# Patient Record
Sex: FEMALE | Race: WHITE | Employment: OTHER | URBAN - METROPOLITAN AREA
[De-identification: names, ages, dates, MRNs, and addresses within clinical notes are randomized per-mention and may not be internally consistent; named-entity substitution may affect disease eponyms.]

---

## 2017-05-17 ENCOUNTER — APPOINTMENT (OUTPATIENT)
Dept: GENERAL RADIOLOGY | Age: 47
End: 2017-05-17
Attending: PHYSICIAN ASSISTANT
Payer: MEDICARE

## 2017-05-17 ENCOUNTER — HOSPITAL ENCOUNTER (EMERGENCY)
Age: 47
Discharge: HOME | End: 2017-05-17
Attending: EMERGENCY MEDICINE
Payer: MEDICARE

## 2017-05-17 VITALS
WEIGHT: 271.61 LBS | HEIGHT: 62 IN | OXYGEN SATURATION: 99 % | RESPIRATION RATE: 20 BRPM | SYSTOLIC BLOOD PRESSURE: 149 MMHG | BODY MASS INDEX: 49.98 KG/M2 | DIASTOLIC BLOOD PRESSURE: 86 MMHG | TEMPERATURE: 97.6 F

## 2017-05-17 DIAGNOSIS — Z98.890 HISTORY OF BACK SURGERY: ICD-10-CM

## 2017-05-17 DIAGNOSIS — M43.10 ANTEROLISTHESIS: Primary | ICD-10-CM

## 2017-05-17 DIAGNOSIS — M54.50 ACUTE MIDLINE LOW BACK PAIN WITHOUT SCIATICA: ICD-10-CM
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
## 2017-05-17 PROCEDURE — 99283 EMERGENCY DEPT VISIT LOW MDM: CPT

## 2017-05-17 PROCEDURE — 74011250637 HC RX REV CODE- 250/637: Performed by: PHYSICIAN ASSISTANT

## 2017-05-17 PROCEDURE — 96372 THER/PROPH/DIAG INJ SC/IM: CPT

## 2017-05-17 PROCEDURE — 74011250636 HC RX REV CODE- 250/636: Performed by: PHYSICIAN ASSISTANT

## 2017-05-17 PROCEDURE — 72100 X-RAY EXAM L-S SPINE 2/3 VWS: CPT

## 2017-05-17 RX ORDER — BISMUTH SUBSALICYLATE 262 MG
1 TABLET,CHEWABLE ORAL DAILY
COMMUNITY

## 2017-05-17 RX ORDER — IBUPROFEN 600 MG/1
600 TABLET ORAL
Qty: 30 TAB | Refills: 0 | Status: SHIPPED | OUTPATIENT
Start: 2017-05-17 | End: 2018-02-21

## 2017-05-17 RX ORDER — IBUPROFEN 600 MG/1
600 TABLET ORAL
Status: COMPLETED | OUTPATIENT
Start: 2017-05-17 | End: 2017-05-17

## 2017-05-17 RX ORDER — NALOXONE HYDROCHLORIDE 1 MG/ML
2 INJECTION INTRAMUSCULAR; INTRAVENOUS; SUBCUTANEOUS
Qty: 1 SYRINGE | Refills: 0 | Status: SHIPPED | OUTPATIENT
Start: 2017-05-17 | End: 2021-01-11

## 2017-05-17 RX ORDER — OXYCODONE AND ACETAMINOPHEN 5; 325 MG/1; MG/1
1 TABLET ORAL
Qty: 15 TAB | Refills: 0 | Status: SHIPPED | OUTPATIENT
Start: 2017-05-17 | End: 2018-02-21

## 2017-05-17 RX ORDER — OXCARBAZEPINE 600 MG/1
600 TABLET, FILM COATED ORAL 3 TIMES DAILY
Status: ON HOLD | COMMUNITY
End: 2018-02-23

## 2017-05-17 RX ORDER — HYDROMORPHONE HYDROCHLORIDE 1 MG/ML
1 INJECTION, SOLUTION INTRAMUSCULAR; INTRAVENOUS; SUBCUTANEOUS
Status: COMPLETED | OUTPATIENT
Start: 2017-05-17 | End: 2017-05-17

## 2017-05-17 RX ADMIN — IBUPROFEN 600 MG: 600 TABLET, FILM COATED ORAL at 15:19

## 2017-05-17 RX ADMIN — HYDROMORPHONE HYDROCHLORIDE 1 MG: 1 INJECTION, SOLUTION INTRAMUSCULAR; INTRAVENOUS; SUBCUTANEOUS at 15:19

## 2017-05-17 NOTE — ED PROVIDER NOTES
The history is provided by the patient. No  was used. Heike Khan is a 42 y.o. female who presents ambulatory  to [facility] ED with PMH of bipolar, dissciative identity disorder, PTSD, NIDDM, c/o10/10 scale exacerbation of already present lower back pain after slipping on small piece of wood last night causing her to land directly on her tailbone. Patient with hx previous back surgery by Dr Toni Skaggs x 8 years ago. Since injury, patient has been unable to lie flat on her back. Patient denies other specific c/o or concerns today. PCP: Luis Miranda MD  Allergies: darvon  Social Hx: never tobacco, no alcohol    There are no other complaints, changes or physical findings at this time. Past Medical History:   Diagnosis Date    Bipolar affective disorder ([facility] [address]     Dissociative identity disorder     NIDDM (non-insulin dependent diabetes mellitus)     PTSD (post-traumatic stress disorder)        Past Surgical History:   Procedure Laterality Date    DELIVERY       HX BACK SURGERY      HX CHOLECYSTECTOMY      HX TONSILLECTOMY      ID REPAIR ACHILLES TENDON,PRIMARY           History reviewed. No pertinent family history. Social History     Social History    Marital status:      Spouse name: N/A    Number of children: N/A    Years of education: N/A     Occupational History    Not on file. Social History Main Topics    Smoking status: Never Smoker    Smokeless tobacco: Never Used    Alcohol use No    Drug use: No    Sexual activity: Yes     Partners: Male     Birth control/ protection: None     Other Topics Concern    Not on file     Social History Narrative         ALLERGIES: Darvon [propoxyphene]    Review of Systems   Constitutional: Negative for fatigue. HENT: Negative for ear pain and sore throat. Eyes: Negative for pain, redness and visual disturbance. Respiratory: Negative for cough and shortness of breath.     Cardiovascular: Negative for palpitations. Gastrointestinal: Negative for nausea and vomiting. Genitourinary: Negative for frequency and urgency. Musculoskeletal: Positive for back pain. Negative for gait problem, neck pain and neck stiffness. Tailbone/lower back pain s/p fall last night, hx baseline back pain prior to last night's fall. Skin: Negative for rash and wound. Neurological: Negative for dizziness and light-headedness. Vitals:    05/17/17 1328   BP: 149/86   Resp: 20   Temp: 97.6 °F (36.4 °C)   SpO2: 99%   Weight: 123.2 kg (271 lb 9.7 oz)   Height: 5' 2\" (1.575 m)            Physical Exam   Constitutional: She is oriented to person, place, and time. She appears well-developed and well-nourished. Non-toxic appearance. No distress. HENT:   Head: Normocephalic and atraumatic. Right Ear: External ear normal.   Left Ear: External ear normal.   Nose: Nose normal.   Mouth/Throat: Uvula is midline. No trismus in the jaw. Eyes: Conjunctivae and EOM are normal. Pupils are equal, round, and reactive to light. No scleral icterus. Neck: Normal range of motion and full passive range of motion without pain. Cardiovascular: Normal rate and regular rhythm. Pulmonary/Chest: Effort normal. No accessory muscle usage. No tachypnea. No respiratory distress. She has no decreased breath sounds. She has no wheezes. Abdominal: Soft. There is no tenderness. Musculoskeletal: Normal range of motion. Back:    LOWER BACK:  Independently moves from laying to sitting to standing. Well healed surgical scar  No bruising, redness or swelling. No step off. Diffuse muscular discomfort. No CVA tenderness   Neurological: She is alert and oriented to person, place, and time. She is not disoriented. No cranial nerve deficit. GCS eye subscore is 4. GCS verbal subscore is 5. GCS motor subscore is 6.    Negative seated SLR  Symmetric bulk and tone of both LE  Normal sensation along all LE dermatomes  Ambulates independently Skin: Skin is intact. No rash noted. Psychiatric: She has a normal mood and affect. Her speech is normal.   Nursing note and vitals reviewed. MDM  Number of Diagnoses or Management Options  Diagnosis management comments: DDx: lumbar fracture, sacral fracture, coccyx fracture, contusion, fall    ED Course       Procedures     3:29 PM  GENE Cooper at bedside reviewing with patient the imaging results and the treatment plan. Patient verbalizes understanding. 3:40 PM   Patient able to ambulate with slight assistance due to medications given in ER today. GENE Cooper aware. DISCHARGE NOTE:  3:45 PM    The care plan has been outline with the patient and/or family, who verbally conveyed understanding and agreement. Available results have been reviewed. Patient and/or family understand the follow up plan as outlined and discharge instructions. Should their condition deterioration at any time after discharge the patient agrees to return, follow up sooner than outlined or seek medical assistance at the closest Emergency Room as soon as possible. Questions have been answered.  Discharge instructions and educational information regarding the patient's diagnosis as well a list of reasons why the patient would want to seek immediate medical attention, should their condition change, were reviewed directly with the patient/family       IMAGING COMPLETED AND REVIEWED:    Ordering Provider: Ritesh Cruz RN Authorizing Provider: GENE Jones   Ordering Phone: [phone] Authorizing Phone:     Ordering Fax:   Attending Provider: Coleen Rivera MD   Ordering Pager:   PCP: Mynor Vasquez      Other Ordering Provider:        Procedure: XR SPINE LUMB 2 OR 3 V [71167]    Procedure Date: 05/17/2017 2:37 PM   Accession Number: [id number]   Order Number: [id number]      Ordering Diagnosis:     Reason for Exam: low back pain   Performing Department: [facility] RADIOLOGY   Patient Class: EMERGENCY          Study Result      INDICATION: Low back pain     COMPARISON: 8/12/2009 lumbar spine CT     FINDINGS: AP, lateral, and coned-down lateral views of the lumbar spine  demonstrate 5 lumbar type vertebral bodies. There is new grade 1 anterolisthesis  of L4 on L5 by approximately 7 mm. There is no evidence of acute fracture. Anterior fusion hardware at L5-S1 appears intact, and there is evidence of  adequate interosseous fusion. The sacroiliac joints appear intact. No soft  tissue abnormalities are seen.     IMPRESSION  IMPRESSION: New grade 1 anterolisthesis at L4-5. No evidence of acute fracture. Intact fusion hardware L5-S1.                    There are no end exam questions for this visit.         Signing Date/Time: 05/17/2017 3:12 PM   Signed by:  Mechelle Boyd MD   Interpreted/Read by: Mechelle Boyd MD                Medications   HYDROmorphone (PF) (DILAUDID) injection 1 mg (1 mg IntraMUSCular Given 5/17/17 1519)   ibuprofen (MOTRIN) tablet 600 mg (600 mg Oral Given 5/17/17 1519)       CLINICAL IMPRESSION:  1. Anterolisthesis    2. Acute midline low back pain without sciatica    3. History of back surgery    4. Fall, initial encounter        Plan:  1. Take today's ER prescribed medications as directed  2. Rest  3. Narcotic precautions reviewed with patient prior to discharge  4. Follow up with margot Padron, as needed  5. Return precautions reviewed with patient prior to discharge    Current Discharge Medication List      START taking these medications    Details   naloxone (NARCAN) 1 mg/mL injection 2 mL by IntraMUSCular route once as needed for up to 1 dose. Qty: 1 Syringe, Refills: 0      oxyCODONE-acetaminophen (PERCOCET) 5-325 mg per tablet Take 1 Tab by mouth every four (4) hours as needed for Pain. Max Daily Amount: 6 Tabs. Qty: 15 Tab, Refills: 0      ibuprofen (MOTRIN) 600 mg tablet Take 1 Tab by mouth every eight (8) hours as needed for Pain.   Qty: 30 Tab, Refills: 0           Follow-up Information     Follow up With Details Comments Contact Info    Nicolasa Bobo MD Schedule an appointment as soon as possible for a visit ORTHO/SPINE: as needed if symptoms persist [facility] [address]  [place]  [phone]          Return to the closest emergency room or follow up sooner for any deterioration    This note is prepared by Leonetta Meckel, acting as Scribe for CHITO Agarwal PA-C : The scribe's documentation has been prepared under my direction and personally reviewed by me in its entirety. I confirm that the note above accurately reflects all work, treatment, procedures, and medical decision making performed by me.    3:46 PM  I was personally available for consultation in the emergency department. I have reviewed the chart and agree with the documentation recorded by the [facility], including the assessment, treatment plan, and disposition.   Kendra Posey MD

## 2017-05-17 NOTE — ED NOTES
Patient reports she slipped yesterday, falling on her buttocks, now c/o low back pain radiating down posterior thighs

## 2017-05-17 NOTE — DISCHARGE INSTRUCTIONS
Learning About Relief for Back Pain  What is back tension and strain? Back strain happens when you overstretch, or pull, a muscle in your back. You may hurt your back in an accident or when you exercise or lift something. Most back pain will get better with rest and time. You can take care of yourself at home to help your back heal.  What can you do first to relieve back pain? When you first feel back pain, try these steps:  · Walk. Take a short walk (10 to 20 minutes) on a level surface (no slopes, hills, or stairs) every 2 to 3 hours. Walk only distances you can manage without pain, especially leg pain. · Relax. Find a comfortable position for rest. Some people are comfortable on the floor or a medium-firm bed with a small pillow under their head and another under their knees. Some people prefer to lie on their side with a pillow between their knees. Don't stay in one position for too long. · Try heat or ice. Try using a heating pad on a low or medium setting, or take a warm shower, for 15 to 20 minutes every 2 to 3 hours. Or you can buy single-use heat wraps that last up to 8 hours. You can also try an ice pack for 10 to 15 minutes every 2 to 3 hours. You can use an ice pack or a bag of frozen vegetables wrapped in a thin towel. There is not strong evidence that either heat or ice will help, but you can try them to see if they help. You may also want to try switching between heat and cold. · Take pain medicine exactly as directed. ¨ If the doctor gave you a prescription medicine for pain, take it as prescribed. ¨ If you are not taking a prescription pain medicine, ask your doctor if you can take an over-the-counter medicine. What else can you do? · Stretch and exercise. Exercises that increase flexibility may relieve your pain and make it easier for your muscles to keep your spine in a good, neutral position. And don't forget to keep walking. · Do self-massage.  You can use self-massage to unwind after work or school or to energize yourself in the morning. You can easily massage your feet, hands, or neck. Self-massage works best if you are in comfortable clothes and are sitting or lying in a comfortable position. Use oil or lotion to massage bare skin. · Reduce stress. Back pain can lead to a vicious [place]: Distress about the pain tenses the muscles in your back, which in turn causes more pain. Learn how to relax your mind and your muscles to lower your stress. Where can you learn more? Go to http://alicia-elfego.info/. Enter A560 in the search box to learn more about \"Learning About Relief for Back Pain. \"  Current as of: May 23, 2016  Content Version: 11.2  © 7816-4012 [employer]. Care instructions adapted under license by [employer] (which disclaims liability or warranty for this information). If you have questions about a medical condition or this instruction, always ask your healthcare professional. [place] [address] any warranty or liability for your use of this information.

## 2017-05-17 NOTE — ED NOTES
Pt ambulated without difficulty, states she feels a little loopy but her back pain is gone;  Discharge instructions reviewed with pt and copy given by paul garland pAC

## 2017-06-16 ENCOUNTER — HOSPITAL ENCOUNTER (OUTPATIENT)
Dept: MRI IMAGING | Age: 47
Discharge: HOME | End: 2017-06-16
Attending: ORTHOPAEDIC SURGERY
Payer: MEDICARE

## 2017-06-16 DIAGNOSIS — Z98.1 S/P SPINAL FUSION: ICD-10-CM

## 2017-06-16 DIAGNOSIS — M43.16 SPONDYLOLISTHESIS OF LUMBAR REGION: ICD-10-CM

## 2017-06-16 DIAGNOSIS — G89.29 CHRONIC LOW BACK PAIN WITH BILATERAL SCIATICA: ICD-10-CM

## 2017-06-16 DIAGNOSIS — M54.41 CHRONIC LOW BACK PAIN WITH BILATERAL SCIATICA: ICD-10-CM

## 2017-06-16 DIAGNOSIS — G89.29 CHRONIC LOW BACK PAIN: ICD-10-CM

## 2017-06-16 DIAGNOSIS — M54.42 CHRONIC LOW BACK PAIN WITH BILATERAL SCIATICA: ICD-10-CM

## 2017-06-16 DIAGNOSIS — M54.50 CHRONIC LOW BACK PAIN: ICD-10-CM

## 2017-06-16 PROCEDURE — 72158 MRI LUMBAR SPINE W/O & W/DYE: CPT

## 2017-06-16 PROCEDURE — A9576 INJ PROHANCE MULTIPACK: HCPCS | Performed by: ORTHOPAEDIC SURGERY

## 2017-06-16 PROCEDURE — 74011250636 HC RX REV CODE- 250/636: Performed by: ORTHOPAEDIC SURGERY

## 2017-06-16 RX ADMIN — GADOTERIDOL 20 ML: 279.3 INJECTION, SOLUTION INTRAVENOUS at 12:54

## 2017-07-12 ENCOUNTER — HOSPITAL ENCOUNTER (OUTPATIENT)
Dept: MAMMOGRAPHY | Age: 47
Discharge: HOME | End: 2017-07-12
Attending: OBSTETRICS & GYNECOLOGY
Payer: MEDICARE

## 2017-07-12 DIAGNOSIS — N64.9 DISORDER OF BREAST: ICD-10-CM

## 2017-07-12 DIAGNOSIS — Z80.3 FAMILY HISTORY OF MALIGNANT NEOPLASM OF BREAST: ICD-10-CM

## 2017-07-12 PROCEDURE — 77066 DX MAMMO INCL CAD BI: CPT

## 2018-02-21 ENCOUNTER — HOSPITAL ENCOUNTER (INPATIENT)
Age: 48
LOS: 2 days | Discharge: HOME | DRG: 641 | End: 2018-02-23
Attending: EMERGENCY MEDICINE | Admitting: INTERNAL MEDICINE
Payer: MEDICARE

## 2018-02-21 ENCOUNTER — HOSPITAL ENCOUNTER (OUTPATIENT)
Dept: PREADMISSION TESTING | Age: 48
Discharge: HOME | DRG: 641 | End: 2018-02-21
Attending: ORTHOPAEDIC SURGERY
Payer: MEDICARE

## 2018-02-21 ENCOUNTER — HOSPITAL ENCOUNTER (OUTPATIENT)
Dept: GENERAL RADIOLOGY | Age: 48
Discharge: HOME | DRG: 641 | End: 2018-02-21
Attending: ORTHOPAEDIC SURGERY
Payer: MEDICARE

## 2018-02-21 VITALS
HEIGHT: 62 IN | HEART RATE: 76 BPM | RESPIRATION RATE: 16 BRPM | WEIGHT: 267.2 LBS | OXYGEN SATURATION: 96 % | BODY MASS INDEX: 49.17 KG/M2 | DIASTOLIC BLOOD PRESSURE: 78 MMHG | SYSTOLIC BLOOD PRESSURE: 149 MMHG | TEMPERATURE: 98.3 F

## 2018-02-21 DIAGNOSIS — E87.1 HYPONATREMIA: Primary | ICD-10-CM

## 2018-02-21 LAB
25(OH)D3 SERPL-MCNC: 109.6 NG/ML (ref 30–100)
ABO + RH BLD: NORMAL
ALBUMIN SERPL-MCNC: 3.9 G/DL (ref 3.5–5)
ALBUMIN SERPL-MCNC: 4.2 G/DL (ref 3.5–5)
ALBUMIN/GLOB SERPL: 1 {RATIO} (ref 1.1–2.2)
ALBUMIN/GLOB SERPL: 1.1 {RATIO} (ref 1.1–2.2)
ALP SERPL-CCNC: 92 U/L (ref 45–117)
ALP SERPL-CCNC: 98 U/L (ref 45–117)
ALT SERPL-CCNC: 24 U/L (ref 12–78)
ALT SERPL-CCNC: 25 U/L (ref 12–78)
ANION GAP SERPL CALC-SCNC: 10 MMOL/L (ref 5–15)
ANION GAP SERPL CALC-SCNC: 7 MMOL/L (ref 5–15)
APPEARANCE UR: CLEAR
AST SERPL-CCNC: 13 U/L (ref 15–37)
AST SERPL-CCNC: 15 U/L (ref 15–37)
ATRIAL RATE: 66 BPM
BACTERIA URNS QL MICRO: NEGATIVE /HPF
BASOPHILS # BLD: 0.1 K/UL (ref 0–0.1)
BASOPHILS NFR BLD: 1 % (ref 0–1)
BILIRUB SERPL-MCNC: 0.3 MG/DL (ref 0.2–1)
BILIRUB SERPL-MCNC: 0.4 MG/DL (ref 0.2–1)
BILIRUB UR QL: NEGATIVE
BLOOD GROUP ANTIBODIES SERPL: NORMAL
BUN SERPL-MCNC: 5 MG/DL (ref 6–20)
BUN SERPL-MCNC: 6 MG/DL (ref 6–20)
BUN/CREAT SERPL: 11 (ref 12–20)
BUN/CREAT SERPL: 8 (ref 12–20)
CALCIUM SERPL-MCNC: 8.3 MG/DL (ref 8.5–10.1)
CALCIUM SERPL-MCNC: 8.8 MG/DL (ref 8.5–10.1)
CALCULATED P AXIS, ECG09: 28 DEGREES
CALCULATED R AXIS, ECG10: 10 DEGREES
CALCULATED T AXIS, ECG11: 16 DEGREES
CHLORIDE SERPL-SCNC: 84 MMOL/L (ref 97–108)
CHLORIDE SERPL-SCNC: 85 MMOL/L (ref 97–108)
CO2 SERPL-SCNC: 26 MMOL/L (ref 21–32)
CO2 SERPL-SCNC: 26 MMOL/L (ref 21–32)
COLOR UR: NORMAL
CREAT SERPL-MCNC: 0.54 MG/DL (ref 0.55–1.02)
CREAT SERPL-MCNC: 0.62 MG/DL (ref 0.55–1.02)
DIAGNOSIS, 93000: NORMAL
DIFFERENTIAL METHOD BLD: ABNORMAL
EOSINOPHIL # BLD: 0.1 K/UL (ref 0–0.4)
EOSINOPHIL NFR BLD: 1 % (ref 0–7)
EPITH CASTS URNS QL MICRO: NORMAL /LPF
ERYTHROCYTE [DISTWIDTH] IN BLOOD BY AUTOMATED COUNT: 11.7 % (ref 11.5–14.5)
ERYTHROCYTE [DISTWIDTH] IN BLOOD BY AUTOMATED COUNT: 11.9 % (ref 11.5–14.5)
EST. AVERAGE GLUCOSE BLD GHB EST-MCNC: 140 MG/DL
GLOBULIN SER CALC-MCNC: 3.9 G/DL (ref 2–4)
GLOBULIN SER CALC-MCNC: 4 G/DL (ref 2–4)
GLUCOSE BLD STRIP.AUTO-MCNC: 178 MG/DL (ref 65–100)
GLUCOSE SERPL-MCNC: 104 MG/DL (ref 65–100)
GLUCOSE SERPL-MCNC: 109 MG/DL (ref 65–100)
GLUCOSE UR STRIP.AUTO-MCNC: NEGATIVE MG/DL
HBA1C MFR BLD: 6.5 % (ref 4.2–6.3)
HCT VFR BLD AUTO: 35.3 % (ref 35–47)
HCT VFR BLD AUTO: 38.1 % (ref 35–47)
HGB BLD-MCNC: 12.7 G/DL (ref 11.5–16)
HGB BLD-MCNC: 13.1 G/DL (ref 11.5–16)
HGB UR QL STRIP: NEGATIVE
HYALINE CASTS URNS QL MICRO: NORMAL /LPF (ref 0–5)
IMM GRANULOCYTES # BLD: 0 K/UL (ref 0–0.04)
IMM GRANULOCYTES NFR BLD AUTO: 0 % (ref 0–0.5)
INR PPP: 1 (ref 0.9–1.1)
KETONES UR QL STRIP.AUTO: NEGATIVE MG/DL
LEUKOCYTE ESTERASE UR QL STRIP.AUTO: NEGATIVE
LYMPHOCYTES # BLD: 2.8 K/UL (ref 0.8–3.5)
LYMPHOCYTES NFR BLD: 30 % (ref 12–49)
MAGNESIUM SERPL-MCNC: 2 MG/DL (ref 1.6–2.4)
MCH RBC QN AUTO: 27.4 PG (ref 26–34)
MCH RBC QN AUTO: 28.1 PG (ref 26–34)
MCHC RBC AUTO-ENTMCNC: 34.4 G/DL (ref 30–36.5)
MCHC RBC AUTO-ENTMCNC: 36 G/DL (ref 30–36.5)
MCV RBC AUTO: 78.1 FL (ref 80–99)
MCV RBC AUTO: 79.7 FL (ref 80–99)
MONOCYTES # BLD: 0.6 K/UL (ref 0–1)
MONOCYTES NFR BLD: 6 % (ref 5–13)
NEUTS SEG # BLD: 5.7 K/UL (ref 1.8–8)
NEUTS SEG NFR BLD: 61 % (ref 32–75)
NITRITE UR QL STRIP.AUTO: NEGATIVE
NRBC # BLD: 0 K/UL (ref 0–0.01)
NRBC # BLD: 0.02 K/UL (ref 0–0.01)
NRBC BLD-RTO: 0 PER 100 WBC
NRBC BLD-RTO: 0.2 PER 100 WBC
P-R INTERVAL, ECG05: 176 MS
PH UR STRIP: 7 [PH] (ref 5–8)
PLATELET # BLD AUTO: 211 K/UL (ref 150–400)
PLATELET # BLD AUTO: 317 K/UL (ref 150–400)
PMV BLD AUTO: 10.9 FL (ref 8.9–12.9)
PMV BLD AUTO: 9.5 FL (ref 8.9–12.9)
POTASSIUM SERPL-SCNC: 4 MMOL/L (ref 3.5–5.1)
POTASSIUM SERPL-SCNC: 4.1 MMOL/L (ref 3.5–5.1)
PREALB SERPL-MCNC: 34.3 MG/DL (ref 20–40)
PROT SERPL-MCNC: 7.8 G/DL (ref 6.4–8.2)
PROT SERPL-MCNC: 8.2 G/DL (ref 6.4–8.2)
PROT UR STRIP-MCNC: NEGATIVE MG/DL
PROTHROMBIN TIME: 10.4 SEC (ref 9–11.1)
Q-T INTERVAL, ECG07: 424 MS
QRS DURATION, ECG06: 86 MS
QTC CALCULATION (BEZET), ECG08: 444 MS
RBC # BLD AUTO: 4.52 M/UL (ref 3.8–5.2)
RBC # BLD AUTO: 4.78 M/UL (ref 3.8–5.2)
RBC #/AREA URNS HPF: NORMAL /HPF (ref 0–5)
SERVICE CMNT-IMP: ABNORMAL
SODIUM SERPL-SCNC: 118 MMOL/L (ref 136–145)
SODIUM SERPL-SCNC: 120 MMOL/L (ref 136–145)
SODIUM UR-SCNC: 46 MMOL/L
SP GR UR REFRACTOMETRY: 1.01 (ref 1–1.03)
SPECIMEN EXP DATE BLD: NORMAL
UA: UC IF INDICATED,UAUC: NORMAL
UROBILINOGEN UR QL STRIP.AUTO: 0.2 EU/DL (ref 0.2–1)
VENTRICULAR RATE, ECG03: 66 BPM
WBC # BLD AUTO: 7.2 K/UL (ref 3.6–11)
WBC # BLD AUTO: 9.4 K/UL (ref 3.6–11)
WBC URNS QL MICRO: NORMAL /HPF (ref 0–4)

## 2018-02-21 PROCEDURE — 80053 COMPREHEN METABOLIC PANEL: CPT | Performed by: ORTHOPAEDIC SURGERY

## 2018-02-21 PROCEDURE — 83036 HEMOGLOBIN GLYCOSYLATED A1C: CPT | Performed by: ORTHOPAEDIC SURGERY

## 2018-02-21 PROCEDURE — 84134 ASSAY OF PREALBUMIN: CPT | Performed by: ORTHOPAEDIC SURGERY

## 2018-02-21 PROCEDURE — 86900 BLOOD TYPING SEROLOGIC ABO: CPT | Performed by: ORTHOPAEDIC SURGERY

## 2018-02-21 PROCEDURE — 93005 ELECTROCARDIOGRAM TRACING: CPT

## 2018-02-21 PROCEDURE — 94761 N-INVAS EAR/PLS OXIMETRY MLT: CPT

## 2018-02-21 PROCEDURE — 71046 X-RAY EXAM CHEST 2 VIEWS: CPT

## 2018-02-21 PROCEDURE — 74011250636 HC RX REV CODE- 250/636: Performed by: EMERGENCY MEDICINE

## 2018-02-21 PROCEDURE — 85027 COMPLETE CBC AUTOMATED: CPT | Performed by: ORTHOPAEDIC SURGERY

## 2018-02-21 PROCEDURE — 81001 URINALYSIS AUTO W/SCOPE: CPT | Performed by: ORTHOPAEDIC SURGERY

## 2018-02-21 PROCEDURE — 80053 COMPREHEN METABOLIC PANEL: CPT | Performed by: PHYSICIAN ASSISTANT

## 2018-02-21 PROCEDURE — 36415 COLL VENOUS BLD VENIPUNCTURE: CPT | Performed by: PHYSICIAN ASSISTANT

## 2018-02-21 PROCEDURE — 82306 VITAMIN D 25 HYDROXY: CPT | Performed by: ORTHOPAEDIC SURGERY

## 2018-02-21 PROCEDURE — 85025 COMPLETE CBC W/AUTO DIFF WBC: CPT | Performed by: PHYSICIAN ASSISTANT

## 2018-02-21 PROCEDURE — 82962 GLUCOSE BLOOD TEST: CPT

## 2018-02-21 PROCEDURE — 74011250636 HC RX REV CODE- 250/636: Performed by: INTERNAL MEDICINE

## 2018-02-21 PROCEDURE — 65270000029 HC RM PRIVATE

## 2018-02-21 PROCEDURE — 83735 ASSAY OF MAGNESIUM: CPT | Performed by: PHYSICIAN ASSISTANT

## 2018-02-21 PROCEDURE — 74011250637 HC RX REV CODE- 250/637: Performed by: INTERNAL MEDICINE

## 2018-02-21 PROCEDURE — 84300 ASSAY OF URINE SODIUM: CPT | Performed by: EMERGENCY MEDICINE

## 2018-02-21 PROCEDURE — 99284 EMERGENCY DEPT VISIT MOD MDM: CPT

## 2018-02-21 PROCEDURE — 85610 PROTHROMBIN TIME: CPT | Performed by: ORTHOPAEDIC SURGERY

## 2018-02-21 RX ORDER — ATORVASTATIN CALCIUM 40 MG/1
40 TABLET, FILM COATED ORAL DAILY
Status: DISCONTINUED | OUTPATIENT
Start: 2018-02-22 | End: 2018-02-23 | Stop reason: HOSPADM

## 2018-02-21 RX ORDER — SODIUM CHLORIDE 0.9 % (FLUSH) 0.9 %
5-10 SYRINGE (ML) INJECTION AS NEEDED
Status: DISCONTINUED | OUTPATIENT
Start: 2018-02-21 | End: 2018-02-23 | Stop reason: HOSPADM

## 2018-02-21 RX ORDER — FACIAL-BODY WIPES
10 EACH TOPICAL DAILY PRN
Status: DISCONTINUED | OUTPATIENT
Start: 2018-02-21 | End: 2018-02-23 | Stop reason: HOSPADM

## 2018-02-21 RX ORDER — CLONAZEPAM 1 MG/1
1 TABLET ORAL 2 TIMES DAILY
COMMUNITY
End: 2021-01-11 | Stop reason: SDUPTHER

## 2018-02-21 RX ORDER — CLONAZEPAM 0.5 MG/1
0.5 TABLET ORAL
Status: DISCONTINUED | OUTPATIENT
Start: 2018-02-22 | End: 2018-02-23 | Stop reason: HOSPADM

## 2018-02-21 RX ORDER — ENOXAPARIN SODIUM 100 MG/ML
40 INJECTION SUBCUTANEOUS EVERY 12 HOURS
Status: DISCONTINUED | OUTPATIENT
Start: 2018-02-21 | End: 2018-02-23 | Stop reason: HOSPADM

## 2018-02-21 RX ORDER — SODIUM CHLORIDE 9 MG/ML
75 INJECTION, SOLUTION INTRAVENOUS CONTINUOUS
Status: DISCONTINUED | OUTPATIENT
Start: 2018-02-21 | End: 2018-02-23 | Stop reason: HOSPADM

## 2018-02-21 RX ORDER — INSULIN LISPRO 100 [IU]/ML
8 INJECTION, SOLUTION INTRAVENOUS; SUBCUTANEOUS
COMMUNITY
End: 2021-01-11

## 2018-02-21 RX ORDER — ONDANSETRON 2 MG/ML
4 INJECTION INTRAMUSCULAR; INTRAVENOUS
Status: DISCONTINUED | OUTPATIENT
Start: 2018-02-21 | End: 2018-02-23 | Stop reason: HOSPADM

## 2018-02-21 RX ORDER — MELOXICAM 15 MG/1
15 TABLET ORAL DAILY
COMMUNITY
End: 2021-01-11

## 2018-02-21 RX ORDER — SODIUM CHLORIDE 0.9 % (FLUSH) 0.9 %
5-10 SYRINGE (ML) INJECTION EVERY 8 HOURS
Status: DISCONTINUED | OUTPATIENT
Start: 2018-02-21 | End: 2018-02-23 | Stop reason: HOSPADM

## 2018-02-21 RX ORDER — BACLOFEN 10 MG/1
10 TABLET ORAL 2 TIMES DAILY
COMMUNITY
End: 2021-01-21

## 2018-02-21 RX ORDER — INSULIN LISPRO 100 [IU]/ML
INJECTION, SOLUTION INTRAVENOUS; SUBCUTANEOUS
Status: DISCONTINUED | OUTPATIENT
Start: 2018-02-22 | End: 2018-02-23 | Stop reason: HOSPADM

## 2018-02-21 RX ORDER — NALOXONE HYDROCHLORIDE 0.4 MG/ML
0.4 INJECTION, SOLUTION INTRAMUSCULAR; INTRAVENOUS; SUBCUTANEOUS AS NEEDED
Status: DISCONTINUED | OUTPATIENT
Start: 2018-02-21 | End: 2018-02-23 | Stop reason: HOSPADM

## 2018-02-21 RX ORDER — ACETAMINOPHEN 325 MG/1
650 TABLET ORAL
Status: DISCONTINUED | OUTPATIENT
Start: 2018-02-21 | End: 2018-02-23 | Stop reason: HOSPADM

## 2018-02-21 RX ORDER — OXYCODONE AND ACETAMINOPHEN 5; 325 MG/1; MG/1
1 TABLET ORAL
Status: DISCONTINUED | OUTPATIENT
Start: 2018-02-21 | End: 2018-02-23 | Stop reason: HOSPADM

## 2018-02-21 RX ORDER — GABAPENTIN 300 MG/1
600 CAPSULE ORAL 3 TIMES DAILY
COMMUNITY
End: 2022-06-27

## 2018-02-21 RX ORDER — ERGOCALCIFEROL 1.25 MG/1
50000 CAPSULE ORAL 2 TIMES WEEKLY
COMMUNITY
End: 2021-01-11

## 2018-02-21 RX ORDER — ACETAMINOPHEN 500 MG
500 TABLET ORAL
COMMUNITY
End: 2020-10-19

## 2018-02-21 RX ORDER — MAGNESIUM SULFATE 100 %
4 CRYSTALS MISCELLANEOUS AS NEEDED
Status: DISCONTINUED | OUTPATIENT
Start: 2018-02-21 | End: 2018-02-23 | Stop reason: HOSPADM

## 2018-02-21 RX ORDER — METFORMIN HYDROCHLORIDE 1000 MG/1
1000 TABLET ORAL 2 TIMES DAILY WITH MEALS
COMMUNITY

## 2018-02-21 RX ORDER — LISINOPRIL 20 MG/1
20 TABLET ORAL DAILY
Status: DISCONTINUED | OUTPATIENT
Start: 2018-02-22 | End: 2018-02-23 | Stop reason: HOSPADM

## 2018-02-21 RX ORDER — GABAPENTIN 300 MG/1
300 CAPSULE ORAL 2 TIMES DAILY
Status: DISCONTINUED | OUTPATIENT
Start: 2018-02-22 | End: 2018-02-23 | Stop reason: HOSPADM

## 2018-02-21 RX ORDER — DEXTROSE 50 % IN WATER (D50W) INTRAVENOUS SYRINGE
12.5-25 AS NEEDED
Status: DISCONTINUED | OUTPATIENT
Start: 2018-02-21 | End: 2018-02-23 | Stop reason: HOSPADM

## 2018-02-21 RX ORDER — BACLOFEN 10 MG/1
10 TABLET ORAL EVERY EVENING
Status: DISCONTINUED | OUTPATIENT
Start: 2018-02-22 | End: 2018-02-23 | Stop reason: HOSPADM

## 2018-02-21 RX ORDER — METFORMIN HYDROCHLORIDE 500 MG/1
1000 TABLET ORAL 2 TIMES DAILY WITH MEALS
Status: DISCONTINUED | OUTPATIENT
Start: 2018-02-22 | End: 2018-02-23 | Stop reason: HOSPADM

## 2018-02-21 RX ORDER — MELOXICAM 7.5 MG/1
15 TABLET ORAL DAILY
Status: DISCONTINUED | OUTPATIENT
Start: 2018-02-22 | End: 2018-02-23 | Stop reason: HOSPADM

## 2018-02-21 RX ORDER — ATORVASTATIN CALCIUM 40 MG/1
40 TABLET, FILM COATED ORAL DAILY
COMMUNITY

## 2018-02-21 RX ORDER — CLONAZEPAM 1 MG/1
0.5 TABLET ORAL
COMMUNITY
End: 2021-01-11

## 2018-02-21 RX ADMIN — SODIUM CHLORIDE 75 ML/HR: 900 INJECTION, SOLUTION INTRAVENOUS at 21:26

## 2018-02-21 RX ADMIN — ENOXAPARIN SODIUM 40 MG: 40 INJECTION SUBCUTANEOUS at 21:26

## 2018-02-21 RX ADMIN — SODIUM CHLORIDE 1000 ML: 900 INJECTION, SOLUTION INTRAVENOUS at 20:28

## 2018-02-21 RX ADMIN — OXYCODONE HYDROCHLORIDE AND ACETAMINOPHEN 1 TABLET: 5; 325 TABLET ORAL at 22:00

## 2018-02-21 RX ADMIN — Medication 10 ML: at 22:00

## 2018-02-21 NOTE — IP AVS SNAPSHOT
[address] [facility] 
[phone] Patient: Anastasiia Sharma MRN: [id number] :1970 A check angelic indicates which time of day the medication should be taken. My Medications CHANGE how you take these medications Instructions Each Dose to Equal  
 Morning Noon Evening Bedtime * clonazePAM 1 mg tablet Commonly known as:  Brena Bevels What changed:  Another medication with the same name was removed. Continue taking this medication, and follow the directions you see here. Your last dose was: Your next dose is: Take 1 mg by mouth two (2) times a day. Administration Instructions: Patient takes 1 mg in the morning and evening and 0.5 mg in the afternoon. 1 mg * clonazePAM 1 mg tablet Commonly known as:  Brena Bevels What changed:  Another medication with the same name was removed. Continue taking this medication, and follow the directions you see here. Your last dose was: Your next dose is: Take 0.5 mg by mouth daily (with lunch). Administration Instructions: Patient takes 1 mg in the morning and evening and 0.5 mg in the afternoon. 0.5 mg  
    
   
   
   
  
 LATUDA 80 mg Tab tablet Generic drug:  lurasidone What changed:  Another medication with the same name was removed. Continue taking this medication, and follow the directions you see here. Your last dose was: Your next dose is: Take 80 mg by mouth daily. 80 mg OXcarbaxepine 600 mg tablet Commonly known as:  TRILEPTAL What changed:   
- how much to take - when to take this Your last dose was: Your next dose is: Take 0.5 Tabs by mouth two (2) times a day. 300 mg  
    
   
   
   
  
 VITAMIN D2 50,000 unit capsule Generic drug:  ergocalciferol What changed:  Another medication with the same name was removed. Continue taking this medication, and follow the directions you see here. Your last dose was: Your next dose is: Take 50,000 Units by mouth two (2) times a week. on Monday and Thursday [address]113 Units * Notice: This list has 2 medication(s) that are the same as other medications prescribed for you. Read the directions carefully, and ask your doctor or other care provider to review them with you. CONTINUE taking these medications Instructions Each Dose to Equal  
 Morning Noon Evening Bedtime  
 atorvastatin 40 mg tablet Commonly known as:  LIPITOR Your last dose was: Your next dose is: Take 40 mg by mouth daily. 40 mg  
    
   
   
   
  
 baclofen 10 mg tablet Commonly known as:  LIORESAL Your last dose was: Your next dose is: Take 10 mg by mouth every evening. 10 mg  
    
   
   
   
  
 gabapentin 300 mg capsule Commonly known as:  NEURONTIN Your last dose was: Your next dose is: Take 300 mg by mouth two (2) times a day. 300 mg  
    
   
   
   
  
 insulin lispro 100 unit/mL kwikpen Commonly known as:  HUMALOG Your last dose was: Your next dose is:    
   
   
 8 Units by SubCUTAneous route Before breakfast and dinner. 8 Units  
    
   
   
   
  
 lisinopril 20 mg tablet Commonly known as:  [place] Boor Your last dose was: Your next dose is: Take 20 mg by mouth daily. 20 mg  
    
   
   
   
  
 meloxicam 15 mg tablet Commonly known as:  MOBIC Your last dose was: Your next dose is: Take 15 mg by mouth daily. 15 mg  
    
   
   
   
  
 metFORMIN 1,000 mg tablet Commonly known as:  GLUCOPHAGE Your last dose was: Your next dose is: Take 1,000 mg by mouth two (2) times daily (with meals). 1000 mg  
    
   
   
   
  
 multivitamin tablet Commonly known as:  ONE A DAY Your last dose was: Your next dose is: Take 1 Tab by mouth daily. 1 Tab  
    
   
   
   
  
 naloxone 1 mg/mL injection Commonly known as:  [employer] Your last dose was: Your next dose is:    
   
   
 2 mL by IntraMUSCular route once as needed for up to 1 dose. 2 mg TYLENOL EXTRA STRENGTH 500 mg tablet Generic drug:  acetaminophen Your last dose was: Your next dose is: Take 500 mg by mouth every six (6) hours as needed for Pain. 500 mg Where to Get Your Medications Information on where to get these meds will be given to you by the nurse or doctor. ! Ask your nurse or doctor about these medications OXcarbaxepine 600 mg tablet

## 2018-02-21 NOTE — PERIOP NOTES
[facility]  Preoperative Instructions        Surgery Date 3/5/2018          Time of Arrival 12 Noon    1. On the day of your surgery, please report to the Surgical Services Registration Desk and sign in at your designated time. The Surgery Center is located to the right of the Emergency Room. 2. You must have someone with you to drive you home. You should not drive a car for 24 hours following surgery. Please make arrangements for a friend or family member to stay with you for the first 24 hours after your surgery. 3. Do not have anything to eat or drink (including water, gum, mints, coffee, juice) after midnight ?3/4/2018? Rogerio Díaz ? This may not apply to medications prescribed by your physician. ?(Please note below the special instructions with medications to take the morning of your procedure.)    4. We recommend you do not drink any alcoholic beverages for 24 hours before and after your surgery. 5. Contact your surgeons office for instructions on the following medications: non-steroidal anti-inflammatory drugs (i.e. Advil, Aleve), vitamins, and supplements. (Some surgeons will want you to stop these medications prior to surgery and others may allow you to take them)  **If you are currently taking Plavix, Coumadin, Aspirin and/or other blood-thinning agents, contact your surgeon for instructions. ** Your surgeon will partner with the physician prescribing these medications to determine if it is safe to stop or if you need to continue taking. Please do not stop taking these medications without instructions from your surgeon    6. Wear comfortable clothes. Wear glasses instead of contacts. Do not bring any money or jewelry. Please bring picture ID, insurance card, and any prearranged co-payment or hospital payment. Do not wear make-up, particularly mascara the morning of your surgery. Do not wear nail polish, particularly if you are having foot /hand surgery.   Wear your hair loose or down, no ponytails, buns, serena pins or clips. All body piercings must be removed. Please shower with antibacterial soap for three consecutive days before and on the morning of surgery, but do not apply any lotions, powders or deodorants after the shower on the day of surgery. Please use a fresh towels after each shower. Please sleep in clean clothes and change bed linens the night before surgery. Please do not shave for 48 hours prior to surgery. Shaving of the face is acceptable. 7. You should understand that if you do not follow these instructions your surgery may be cancelled. If your physical condition changes (I.e. fever, cold or flu) please contact your surgeon as soon as possible. 8. It is important that you be on time. If a situation occurs where you may be late, please call [phone] (OR Holding Area). 9. If you have any questions and or problems, please call [phone] (Pre-admission Testing). 10. Your surgery time may be subject to change. You will receive a phone call the evening prior if your time changes. 11.  If having outpatient surgery, you must have someone to drive you here, stay with you during the duration of your stay, and to drive you home at time of discharge. 12.   In an effort to improve the efficiency, privacy, and safety for all of our Pre-op patients visitors are not allowed in the Holding area. Once you arrive and are registered your family/visitors will be asked to remain in the waiting room. The Pre-op staff will get you from the Surgical Waiting Area and will explain to you and your family/visitors that the Pre-op phase is beginning. The staff will answer any questions and provide instructions for tracking of the patient, by use of the existing tracking number and color-coded status board in the waiting room.   At this time the staff will also ask for your designated spokesperson information in the event that the physician or staff need to provide an update or obtain any pertinent information. The designated spokesperson will be notified if the physician needs to speak to family during the pre-operative phase. If at any time your family/visitors has questions or concerns they may approach the volunteer desk in the waiting area for assistance. Special Instructions:Consult with your Primary Care Doctor regarding management of Insulin prior to surgery. Bring your Incentive Spirometer with you to the hospital the day of surgery    MEDICATIONS TO TAKE THE MORNING OF SURGERY WITH A SIP OF WATER:Klonopin, Gabapentin, Trileptal, Extra Strength Tylenol if needed. I understand a pre-operative phone call will be made to verify my surgery time. In the event that I am not available, I give permission for a message to be left on my answering service and/or with another person?   Yes [phone]         ___________________      __________   _________    (Signature of Patient)             (Witness)                (Date and Time)

## 2018-02-21 NOTE — ADVANCED PRACTICE NURSE
Pt in for PAT assessment. Received call from lab regarding critical sodium level of 118. Pt denies dizziness, HA, weakness, excess water intake (states she is cautious and drinks 6 bottles of water daily due to past hx of hyponatremia. Denies falls. PC to pt's PCP, Dr. Key Key regarding findings. Requests pt be taken to the ED for evaluation. Pt taken to ED triage room 2 and handoff given to Dr. Kelsey Ross. Dr. Norma Mcfarlane' office advised.

## 2018-02-21 NOTE — IP AVS SNAPSHOT
[address] 
[phone] Patient: Tung Zuleta MRN: [id number] :1970 About your hospitalization You were admitted on:  2018 You last received care in the:  [facility] 3 ORTHOPEDICS You were discharged on:  2018 Why you were hospitalized Your primary diagnosis was:  Not on File Your diagnoses also included:  Hyponatremia Follow-up Information Follow up With Details Comments Contact Info Sonali De Jesus MD   [address] [facility] [address] [facility] [id number] 
[phone] Cooper Posey MD Go to surgery , as scheduled [address] [facility] [address] [phone] Your Scheduled Appointments 2018 SPINE LUMBAR LATERAL INTERBODY FUSION (XLIF) with Cooper Posey MD  
[facility] SURGERY (RI OR PRE ASSESSMENT) [address] [facility] [address] [address] [facility] [address] [phone] Discharge Orders None A check angelic indicates which time of day the medication should be taken. My Medications CHANGE how you take these medications Instructions Each Dose to Equal  
 Morning Noon Evening Bedtime * clonazePAM 1 mg tablet Commonly known as:  Paulino Eldridgek What changed:  Another medication with the same name was removed. Continue taking this medication, and follow the directions you see here. Your last dose was: Your next dose is: Take 1 mg by mouth two (2) times a day. Administration Instructions: Patient takes 1 mg in the morning and evening and 0.5 mg in the afternoon. 1 mg * clonazePAM 1 mg tablet Commonly known as:  Paulino Filiberto What changed:  Another medication with the same name was removed. Continue taking this medication, and follow the directions you see here. Your last dose was: Your next dose is: Take 0.5 mg by mouth daily (with lunch). Administration Instructions: Patient takes 1 mg in the morning and evening and 0.5 mg in the afternoon. 0.5 mg  
    
   
   
   
  
 LATUDA 80 mg Tab tablet Generic drug:  lurasidone What changed:  Another medication with the same name was removed. Continue taking this medication, and follow the directions you see here. Your last dose was: Your next dose is: Take 80 mg by mouth daily. 80 mg OXcarbaxepine 600 mg tablet Commonly known as:  TRILEPTAL What changed:   
- how much to take - when to take this Your last dose was: Your next dose is: Take 0.5 Tabs by mouth two (2) times a day. 300 mg  
    
   
   
   
  
 VITAMIN D2 50,000 unit capsule Generic drug:  ergocalciferol What changed:  Another medication with the same name was removed. Continue taking this medication, and follow the directions you see here. Your last dose was: Your next dose is: Take 50,000 Units by mouth two (2) times a week. on Monday and Thursday [address] Units * Notice: This list has 2 medication(s) that are the same as other medications prescribed for you. Read the directions carefully, and ask your doctor or other care provider to review them with you. CONTINUE taking these medications Instructions Each Dose to Equal  
 Morning Noon Evening Bedtime  
 atorvastatin 40 mg tablet Commonly known as:  LIPITOR Your last dose was: Your next dose is: Take 40 mg by mouth daily. 40 mg  
    
   
   
   
  
 baclofen 10 mg tablet Commonly known as:  LIORESAL Your last dose was: Your next dose is: Take 10 mg by mouth every evening. 10 mg  
    
   
   
   
  
 gabapentin 300 mg capsule Commonly known as:  NEURONTIN Your last dose was: Your next dose is: Take 300 mg by mouth two (2) times a day. 300 mg  
    
   
   
   
  
 insulin lispro 100 unit/mL kwikpen Commonly known as:  HUMALOG Your last dose was: Your next dose is:    
   
   
 8 Units by SubCUTAneous route Before breakfast and dinner. 8 Units  
    
   
   
   
  
 lisinopril 20 mg tablet Commonly known as:  Madonna Cliche Your last dose was: Your next dose is: Take 20 mg by mouth daily. 20 mg  
    
   
   
   
  
 meloxicam 15 mg tablet Commonly known as:  MOBIC Your last dose was: Your next dose is: Take 15 mg by mouth daily. 15 mg  
    
   
   
   
  
 metFORMIN 1,000 mg tablet Commonly known as:  GLUCOPHAGE Your last dose was: Your next dose is: Take 1,000 mg by mouth two (2) times daily (with meals). 1000 mg  
    
   
   
   
  
 multivitamin tablet Commonly known as:  ONE A DAY Your last dose was: Your next dose is: Take 1 Tab by mouth daily. 1 Tab  
    
   
   
   
  
 naloxone 1 mg/mL injection Commonly known as:  [employer] Your last dose was: Your next dose is:    
   
   
 2 mL by IntraMUSCular route once as needed for up to 1 dose. 2 mg TYLENOL EXTRA STRENGTH 500 mg tablet Generic drug:  acetaminophen Your last dose was: Your next dose is: Take 500 mg by mouth every six (6) hours as needed for Pain. 500 mg Where to Get Your Medications Information on where to get these meds will be given to you by the nurse or doctor. ! Ask your nurse or doctor about these medications OXcarbaxepine 600 mg tablet Discharge Instructions DISCHARGE DIAGNOSIS: 
Hyponatremia POA, multifactorial in etiology DM type 2 POA Essential HTN POA 
DDD lumbar spine POA Severe bipolar depression POA PTSD POA Hyperlipidemia POA MEDICATIONS: 
· It is important that you take the medication exactly as they are prescribed. · Keep your medication in the bottles provided by the pharmacist and keep a list of the medication names, dosages, and times to be taken in your wallet. · Do not take other medications without consulting your doctor. Pain Management: per above medications What to do at [facility] Recommended diet:  Resume previous diet with lower water intake Recommended activity: Activity as tolerated If you have questions regarding the hospital related prescriptions or hospital related issues please call [address] [place] [address] at . You can always direct your questions to your primary care doctor if you are unable to reach your hospital physician; your PCP works as an extension of your hospital doctor just like your hospital doctor is an extension of your PCP for your time at the hospital [facility]). If you experience any of the following symptoms then please call your primary care physician or return to the emergency room if you cannot get hold of your doctor: 
Fever, chills, nausea, vomiting, diarrhea, change in mentation, falling, bleeding, shortness of breath NOTE THE CHANGE TO YOUR TRILEPTAL DOSING. It is now 300mg twice daily Introducing [facility]! [facility] introduces [employer] patient portal. Now you can access parts of your medical record, email your doctor's office, and request medication refills online. 1. In your internet browser, go to [email or web address] 2. Click on the First Time User? Click Here link in the Sign In box. You will see the New Member Sign Up page. 3. Enter your [employer] Access Code exactly as it appears below. You will not need to use this code after youve completed the sign-up process. If you do not sign up before the expiration date, you must request a new code. · [employer] Access Code: [email or web address] Expires: 5/24/2018  2:39 PM 
 
4. Enter the last four digits of your Social Security Number (xxxx) and Date of Birth (mm/dd/yyyy) as indicated and click Submit. You will be taken to the next sign-up page. 5. Create a [employer] ID. This will be your [employer] login ID and cannot be changed, so think of one that is secure and easy to remember. 6. Create a [employer] password. You can change your password at any time. 7. Enter your Password Reset Question and Answer. This can be used at a later time if you forget your password. 8. Enter your e-mail address. You will receive e-mail notification when new information is available in [address] 9. Click Sign Up. You can now view and download portions of your medical record. 10. Click the Download Summary menu link to download a portable copy of your medical information. If you have questions, please visit the Frequently Asked Questions section of the [employer] website. Remember, [employer] is NOT to be used for urgent needs. For medical emergencies, dial 911. Now available from your iPhone and Android! Providers Seen During Your Hospitalization Provider Specialty Primary office phone Lily Zheng MD Emergency Medicine [phone] Julianne Reyes MD Internal Medicine [phone] Your Primary Care Physician (PCP) Primary Care Physician Office Phone Office Fax [address] [address] [phone] You are allergic to the following Allergen Reactions Darvon (Propoxyphene) Rash Recent Documentation Height Weight Breastfeeding? BMI OB Status Smoking Status 1.575 m 121.2 kg No 48.87 kg/m2 Having regular periods Never Smoker Emergency Contacts Name Discharge Info Relation Home Work Mobile Rikki Sanon DISCHARGE CAREGIVER [3] Spouse [3]   [phone] Patient Belongings The following personal items are in your possession at time of discharge: Dental Appliances: None  Visual Aid: Glasses      Home Medications: None   Jewelry: Ring  Clothing: At bedside    Other Valuables: At bedside, Cell Phone, Purse  Personal Items Sent to Safe: none Please provide this summary of care documentation to your next provider. Signatures-by signing, you are acknowledging that this After Visit Summary has been reviewed with you and you have received a copy. Patient Signature:  ____________________________________________________________ Date:  ____________________________________________________________  
  
[facility] Provider Signature:  ____________________________________________________________ Date:  ____________________________________________________________

## 2018-02-21 NOTE — ED PROVIDER NOTES
EMERGENCY DEPARTMENT HISTORY AND PHYSICAL EXAM      Date: 2/21/2018  Patient Name: Nusrat Burt    History of Presenting Illness     Chief Complaint   Patient presents with    Abnormal Lab Results     was at preadmission testing for L4-L5 fusion on 3/5/18 and was found to be hyponatremic at 118       History Provided By: Patient    HPI: Nusrat Burt, 41 y.o. female with PMHx significant for bipolar affective disorder, dissociative identity disorder, PTSD, NIDDM, HTN, anxiety, depression, and HLD, presents ambulatory to the ED as sent for hyponatremia. Pt states that she was at pre-admission testing for an L4-L5 fusion to be performed on 3/5/18 when she was found to be hyponatremic with a sodium level of 118, at which point she was referred to the ED. She states that she has a hx of hyponatremia in the past, approximately 15 years ago, which was believed to be due to her use of Trileptal. However, pt states that she was taken off the medication but was later restarted on it and has been using it without complication for ~73 years. She reports additional symptoms of moderate constipation, noting her last BM was 2 days ago after taking a laxative with some relief (beforehand, pt hadn't had a BM in ~1 week). Additionally, she has been experiencing intermittent chills. She denies any current use of a diuretic. She denies tobacco and alcohol use. She denies any weakness, body aches, dizziness, lightheadedness, CP, SOB, fever, diarrhea, vomiting, urinary symptoms, HA.     PCP: Jennifer Rogers MD   Family PMhx is significant for DM, HTN, CAD. There are no other complaints, changes, or physical findings at this time. Current Outpatient Prescriptions   Medication Sig Dispense Refill    atorvastatin (LIPITOR) 40 mg tablet Take 40 mg by mouth daily.  baclofen (LIORESAL) 10 mg tablet Take 10 mg by mouth every evening.  gabapentin (NEURONTIN) 300 mg capsule Take 300 mg by mouth two (2) times a day.       insulin lispro (HUMALOG) 100 unit/mL kwikpen 8 Units by SubCUTAneous route Before breakfast and dinner.  acetaminophen (TYLENOL EXTRA STRENGTH) 500 mg tablet Take 500 mg by mouth every six (6) hours as needed for Pain.  clonazePAM (KLONOPIN) 1 mg tablet Take 1 mg by mouth two (2) times a day. Administration Instructions:  Patient takes 1 mg in the morning and evening and 0.5 mg in the afternoon.  clonazePAM (KLONOPIN) 1 mg tablet Take 0.5 mg by mouth daily (with lunch). Administration Instructions:  Patient takes 1 mg in the morning and evening and 0.5 mg in the afternoon.  lurasidone (LATUDA) 80 mg tab tablet Take 80 mg by mouth daily.  meloxicam (MOBIC) 15 mg tablet Take 15 mg by mouth daily.  metFORMIN (GLUCOPHAGE) 1,000 mg tablet Take 1,000 mg by mouth two (2) times daily (with meals).  ergocalciferol (VITAMIN D2) 50,000 unit capsule Take 50,000 Units by mouth two (2) times a week. on Monday and Thursday      OXcarbaxepine (TRILEPTAL) 600 mg tablet Take 600 mg by mouth three (3) times daily.  multivitamin (ONE A DAY) tablet Take 1 Tab by mouth daily.  lisinopril (PRINIVIL, ZESTRIL) 20 mg tablet Take 20 mg by mouth daily.  naloxone (NARCAN) 1 mg/mL injection 2 mL by IntraMUSCular route once as needed for up to 1 dose.  1 Syringe 0       Past History     Past Medical History:  Past Medical History:   Diagnosis Date    Bipolar affective disorder [facility] [address]     Dissociative identity disorder     Hypertension     Ill-defined condition     hyperlipidemia    Morbid obesity ([facility] [address]     NIDDM (non-insulin dependent diabetes mellitus)     Psychiatric disorder     Depression/Anxiety    PTSD (post-traumatic stress disorder)        Past Surgical History:  Past Surgical History:   Procedure Laterality Date    DELIVERY       HX BACK SURGERY      HX BREAST BIOPSY Left     u/s benign biopsy 3 years ago    HX CHOLECYSTECTOMY      HX TONSILLECTOMY      LA REPAIR ACHILLES TENDON,PRIMARY         Family History:  Family History   Problem Relation Age of Onset    Breast Cancer Mother 46    Lung Disease Mother     Heart Disease Mother     Hypertension Mother     Cancer Mother     Breast Cancer Sister 43    Breast Cancer Maternal Grandmother 52      from breast cancer    Breast Cancer Maternal Aunt      6, in 42's, 4  from breast cancer    Breast Cancer Cousin      21 maternal cousins    Heart Disease Father     Diabetes Father     Hypertension Father        Social History:  Social History   Substance Use Topics    Smoking status: Never Smoker    Smokeless tobacco: Never Used    Alcohol use No       Allergies: Allergies   Allergen Reactions    Darvon [Propoxyphene] Rash         Review of Systems   Review of Systems   Constitutional: Positive for chills. Negative for fever. HENT: Negative for congestion. Eyes: Negative for visual disturbance. Respiratory: Negative for shortness of breath. Cardiovascular: Negative for chest pain. Gastrointestinal: Positive for constipation. Negative for diarrhea, nausea and vomiting. Endocrine: Negative for heat intolerance. Genitourinary: Negative. Musculoskeletal: Negative for myalgias. Skin: Negative for rash. Allergic/Immunologic: Negative for immunocompromised state. Neurological: Negative for dizziness, weakness, light-headedness and headaches. Hematological: Does not bruise/bleed easily. Psychiatric/Behavioral: Negative. All other systems reviewed and are negative. Physical Exam   Physical Exam   Constitutional: She is oriented to person, place, and time. She appears well-developed and well-nourished. No distress. Elevated BMI. HENT:   Head: Normocephalic and atraumatic. Eyes: EOM are normal. Pupils are equal, round, and reactive to light. Neck: Normal range of motion. Neck supple.    Cardiovascular: Normal rate, regular rhythm, normal heart sounds and intact distal pulses. Pulmonary/Chest: Effort normal and breath sounds normal. No respiratory distress. Abdominal: Soft. Bowel sounds are normal. She exhibits no mass. There is tenderness in the suprapubic area. Musculoskeletal: Normal range of motion. She exhibits no edema. Neurological: She is alert and oriented to person, place, and time. Coordination normal.   Skin: Skin is warm and dry. Psychiatric: She has a normal mood and affect. Her behavior is normal.   Nursing note and vitals reviewed. Diagnostic Study Results     Labs -  Recent Results (from the past 12 hour(s))   CBC W/O DIFF    Collection Time: 02/21/18  1:15 PM   Result Value Ref Range    WBC 7.2 3.6 - 11.0 K/uL    RBC 4.52 3.80 - 5.20 M/uL    HGB 12.7 11.5 - 16.0 g/dL    HCT 35.3 35.0 - 47.0 %    MCV 78.1 (L) 80.0 - 99.0 FL    MCH 28.1 26.0 - 34.0 PG    MCHC 36.0 30.0 - 36.5 g/dL    RDW 11.9 11.5 - 14.5 %    PLATELET 570 163 - 004 K/uL    MPV 10.9 8.9 - 12.9 FL    NRBC 0.0 0  WBC    ABSOLUTE NRBC 0.00 0.00 - 0.01 K/uL   PROTHROMBIN TIME + INR    Collection Time: 02/21/18  1:15 PM   Result Value Ref Range    INR 1.0 0.9 - 1.1      Prothrombin time 10.4 9.0 - 03.2 sec   METABOLIC PANEL, COMPREHENSIVE    Collection Time: 02/21/18  1:15 PM   Result Value Ref Range    Sodium 118 (LL) 136 - 145 mmol/L    Potassium 4.1 3.5 - 5.1 mmol/L    Chloride 85 (L) 97 - 108 mmol/L    CO2 26 21 - 32 mmol/L    Anion gap 7 5 - 15 mmol/L    Glucose 104 (H) 65 - 100 mg/dL    BUN 6 6 - 20 MG/DL    Creatinine 0.54 (L) 0.55 - 1.02 MG/DL    BUN/Creatinine ratio 11 (L) 12 - 20      GFR est AA >60 >60 ml/min/1.73m2    GFR est non-AA >60 >60 ml/min/1.73m2    Calcium 8.3 (L) 8.5 - 10.1 MG/DL    Bilirubin, total 0.3 0.2 - 1.0 MG/DL    ALT (SGPT) 24 12 - 78 U/L    AST (SGOT) 13 (L) 15 - 37 U/L    Alk.  phosphatase 92 45 - 117 U/L    Protein, total 7.8 6.4 - 8.2 g/dL    Albumin 3.9 3.5 - 5.0 g/dL    Globulin 3.9 2.0 - 4.0 g/dL    A-G Ratio 1.0 (L) 1.1 - 2.2     PREALBUMIN Collection Time: 02/21/18  1:15 PM   Result Value Ref Range    Prealbumin 34.3 20.0 - 40.0 mg/dL   TYPE & SCREEN    Collection Time: 02/21/18  1:16 PM   Result Value Ref Range    Crossmatch Expiration 03/07/2018     ABO/Rh(D) A POSITIVE     Antibody screen NEG    HEMOGLOBIN A1C WITH EAG    Collection Time: 02/21/18  1:16 PM   Result Value Ref Range    Hemoglobin A1c 6.5 (H) 4.2 - 6.3 %    Est. average glucose 140 mg/dL   URINALYSIS W/ REFLEX CULTURE    Collection Time: 02/21/18  1:16 PM   Result Value Ref Range    Color YELLOW/STRAW      Appearance CLEAR CLEAR      Specific gravity 1.010 1.003 - 1.030      pH (UA) 7.0 5.0 - 8.0      Protein NEGATIVE  NEG mg/dL    Glucose NEGATIVE  NEG mg/dL    Ketone NEGATIVE  NEG mg/dL    Bilirubin NEGATIVE  NEG      Blood NEGATIVE  NEG      Urobilinogen 0.2 0.2 - 1.0 EU/dL    Nitrites NEGATIVE  NEG      Leukocyte Esterase NEGATIVE  NEG      WBC 0-4 0 - 4 /hpf    RBC 0-5 0 - 5 /hpf    Epithelial cells FEW FEW /lpf    Bacteria NEGATIVE  NEG /hpf    UA:UC IF INDICATED CULTURE NOT INDICATED BY UA RESULT CNI      Hyaline cast 0-2 0 - 5 /lpf   VITAMIN D, 25 HYDROXY    Collection Time: 02/21/18  1:16 PM   Result Value Ref Range    Vitamin D 25-Hydroxy 109.6 (H) 30 - 100 ng/mL   EKG, 12 LEAD, INITIAL    Collection Time: 02/21/18  3:24 PM   Result Value Ref Range    Ventricular Rate 66 BPM    Atrial Rate 66 BPM    P-R Interval 176 ms    QRS Duration 86 ms    Q-T Interval 424 ms    QTC Calculation (Bezet) 444 ms    Calculated P Axis 28 degrees    Calculated R Axis 10 degrees    Calculated T Axis 16 degrees    Diagnosis       Normal sinus rhythm  No previous ECGs available  Confirmed by Danni Farley ([id number]) on 2/21/2018 6:10:44 PM     CBC WITH AUTOMATED DIFF    Collection Time: 02/21/18  4:49 PM   Result Value Ref Range    WBC 9.4 3.6 - 11.0 K/uL    RBC 4.78 3.80 - 5.20 M/uL    HGB 13.1 11.5 - 16.0 g/dL    HCT 38.1 35.0 - 47.0 %    MCV 79.7 (L) 80.0 - 99.0 FL    MCH 27.4 26.0 - 34.0 PG    MCHC 34.4 30.0 - 36.5 g/dL    RDW 11.7 11.5 - 14.5 %    PLATELET 295 572 - 904 K/uL    MPV 9.5 8.9 - 12.9 FL    NRBC 0.2 (H) 0  WBC    ABSOLUTE NRBC 0.02 (H) 0.00 - 0.01 K/uL    NEUTROPHILS 61 32 - 75 %    LYMPHOCYTES 30 12 - 49 %    MONOCYTES 6 5 - 13 %    EOSINOPHILS 1 0 - 7 %    BASOPHILS 1 0 - 1 %    IMMATURE GRANULOCYTES 0 0.0 - 0.5 %    ABS. NEUTROPHILS 5.7 1.8 - 8.0 K/UL    ABS. LYMPHOCYTES 2.8 0.8 - 3.5 K/UL    ABS. MONOCYTES 0.6 0.0 - 1.0 K/UL    ABS. EOSINOPHILS 0.1 0.0 - 0.4 K/UL    ABS. BASOPHILS 0.1 0.0 - 0.1 K/UL    ABS. IMM. GRANS. 0.0 0.00 - 0.04 K/UL    DF AUTOMATED     METABOLIC PANEL, COMPREHENSIVE    Collection Time: 02/21/18  4:49 PM   Result Value Ref Range    Sodium 120 (L) 136 - 145 mmol/L    Potassium 4.0 3.5 - 5.1 mmol/L    Chloride 84 (L) 97 - 108 mmol/L    CO2 26 21 - 32 mmol/L    Anion gap 10 5 - 15 mmol/L    Glucose 109 (H) 65 - 100 mg/dL    BUN 5 (L) 6 - 20 MG/DL    Creatinine 0.62 0.55 - 1.02 MG/DL    BUN/Creatinine ratio 8 (L) 12 - 20      GFR est AA >60 >60 ml/min/1.73m2    GFR est non-AA >60 >60 ml/min/1.73m2    Calcium 8.8 8.5 - 10.1 MG/DL    Bilirubin, total 0.4 0.2 - 1.0 MG/DL    ALT (SGPT) 25 12 - 78 U/L    AST (SGOT) 15 15 - 37 U/L    Alk. phosphatase 98 45 - 117 U/L    Protein, total 8.2 6.4 - 8.2 g/dL    Albumin 4.2 3.5 - 5.0 g/dL    Globulin 4.0 2.0 - 4.0 g/dL    A-G Ratio 1.1 1.1 - 2.2     MAGNESIUM    Collection Time: 02/21/18  4:49 PM   Result Value Ref Range    Magnesium 2.0 1.6 - 2.4 mg/dL       Radiologic Studies -   CXR Results  (Last 48 hours)               02/21/18 1459  XR CHEST PA LAT Final result    Impression:  IMPRESSION: Normal chest x-ray. Narrative:  EXAM:  XR CHEST PA LAT       INDICATION:   History of hypertension and shortness of breath on exertion. COMPARISON: June 8, 2009. FINDINGS: PA and lateral radiographs of the chest demonstrate clear lungs.  The   cardiac and mediastinal contours and pulmonary vascularity are normal.  The   bones and soft tissues are within normal limits. Medical Decision Making   I am the first provider for this patient. I reviewed the vital signs, available nursing notes, past medical history, past surgical history, family history and social history. Vital Signs-Reviewed the patient's vital signs. Patient Vitals for the past 12 hrs:   Temp Pulse Resp BP SpO2   02/21/18 1930 - - - 128/85 97 %   02/21/18 1918 - - - - 96 %   02/21/18 1915 - - - 128/79 96 %   02/21/18 1900 - - - 138/78 98 %   02/21/18 1857 - - - - 98 %   02/21/18 1845 - - - 141/83 97 %   02/21/18 1842 - - - 140/77 98 %   02/21/18 1826 - - - - 96 %   02/21/18 1520 98.1 °F (36.7 °C) 74 16 162/84 99 %       Records Reviewed: Nursing Notes and Old Medical Records    Provider Notes (Medical Decision Making):   DDx: medication side effect, SIADH, hyponatremia, constipation    ED Course:   Initial assessment performed. The patients presenting problems have been discussed, and they are in agreement with the care plan formulated and outlined with them. I have encouraged them to ask questions as they arise throughout their visit. CONSULT NOTE:   7:23 PM  Jesus Bobo MD spoke with Dr. Aleta Severs,   Specialty: Hospitalist  Discussed pt's hx, disposition, and available diagnostic and imaging results. Reviewed care plans. Consultant will evaluate pt for admission. Disposition:  Admit Note:  7:23 PM  Patient is being admitted to the hospital by Dr. Aleta Severs. The results of their tests and reasons for their admission have been discussed with them and/or available family. They convey agreement and understanding for the need to be admitted and for their admission diagnosis. Consultation has been made with the inpatient physician specialist for hospitalization. PLAN:  1. Admit to Hospitalist     Diagnosis     Clinical Impression:   1. Hyponatremia        Attestations: This note is prepared by Davida Velazquez. Alejandra, acting as Scribe for Rosario Christina MD.    Rosario Christina MD: The scribe's documentation has been prepared under my direction and personally reviewed by me in its entirety. I confirm that the note above accurately reflects all work, treatment, procedures, and medical decision making performed by me.

## 2018-02-21 NOTE — IP AVS SNAPSHOT
Summary of Care Report The Summary of Care report has been created to help improve care coordination. Users with access to [employer] or [address] (Web-based application) may access additional patient information including the Discharge Summary. If you are not currently a [address] user and need more information, please call the number listed below in the [place] [address] section and ask to be connected with Medical Records. Facility Information Name Address Phone [address] [address] [phone] Patient Information Patient Name Sex  Valentina Israel ([id number]) Female 1970 Discharge Information Admitting Provider Service Area Unit Brynda Fleischer, MD / [facility]  [phone] Discharge Provider Discharge Date/Time Discharge Disposition Destination (none) 2018 Afternoon (Pending) AHR (none) Patient Language Language ENGLISH [13] Hospital Problems as of 2018  Reviewed: 2018  2:33 PM by Jeremie Kearney MD  
  
  
  
 Class Noted - Resolved Last Modified POA Active Problems Hyponatremia  2018 - Present 2018 by Brynda Fleischer, MD Unknown Entered by Brynda Fleischer, MD  
  
Non-Hospital Problems as of 2018  Reviewed: 2018  2:33 PM by Jeremie Kearney MD  
  
  
  
 Class Noted - Resolved Last Modified Active Problems Bipolar disorder, mixed ([facility] [address]  2011 - Present 2011 Entered by Rashi Baird You are allergic to the following Allergen Reactions Darvon (Propoxyphene) Rash Current Discharge Medication List  
  
CONTINUE these medications which have CHANGED Dose & Instructions Dispensing Information Comments * clonazePAM 1 mg tablet Commonly known as:  Zetta Jeoy What changed:  Another medication with the same name was removed. Continue taking this medication, and follow the directions you see here. Dose:  1 mg Take 1 mg by mouth two (2) times a day. Administration Instructions: Patient takes 1 mg in the morning and evening and 0.5 mg in the afternoon. Refills:  0  
   
 * clonazePAM 1 mg tablet Commonly known as:  Zetta Joey What changed:  Another medication with the same name was removed. Continue taking this medication, and follow the directions you see here. Dose:  0.5 mg Take 0.5 mg by mouth daily (with lunch). Administration Instructions: Patient takes 1 mg in the morning and evening and 0.5 mg in the afternoon. Refills:  0  
   
 LATUDA 80 mg Tab tablet Generic drug:  lurasidone What changed:  Another medication with the same name was removed. Continue taking this medication, and follow the directions you see here. Dose:  80 mg Take 80 mg by mouth daily. Refills:  0 OXcarbaxepine 600 mg tablet Commonly known as:  TRILEPTAL What changed:   
- how much to take - when to take this Dose:  300 mg Take 0.5 Tabs by mouth two (2) times a day. Quantity:  1 Tab Refills:  0  
   
 VITAMIN D2 50,000 unit capsule Generic drug:  ergocalciferol What changed:  Another medication with the same name was removed. Continue taking this medication, and follow the directions you see here. Dose:  16318 Units Take 50,000 Units by mouth two (2) times a week. on Monday and Thursday Refills:  0  
   
 * Notice: This list has 2 medication(s) that are the same as other medications prescribed for you. Read the directions carefully, and ask your doctor or other care provider to review them with you. CONTINUE these medications which have NOT CHANGED Dose & Instructions Dispensing Information Comments  
 atorvastatin 40 mg tablet Commonly known as:  LIPITOR  Dose:  40 mg  
 Take 40 mg by mouth daily. Refills:  0  
   
 baclofen 10 mg tablet Commonly known as:  LIORESAL Dose:  10 mg Take 10 mg by mouth every evening. Refills:  0  
   
 gabapentin 300 mg capsule Commonly known as:  NEURONTIN Dose:  300 mg Take 300 mg by mouth two (2) times a day. Refills:  0  
   
 insulin lispro 100 unit/mL kwikpen Commonly known as:  HUMALOG Dose:  8 Units 8 Units by SubCUTAneous route Before breakfast and dinner. Refills:  0  
   
 lisinopril 20 mg tablet Commonly known as:  [place]ros [place]ings Dose:  20 mg Take 20 mg by mouth daily. Refills:  0  
   
 meloxicam 15 mg tablet Commonly known as:  MOBIC Dose:  15 mg Take 15 mg by mouth daily. Refills:  0  
   
 metFORMIN 1,000 mg tablet Commonly known as:  GLUCOPHAGE Dose:  1000 mg Take 1,000 mg by mouth two (2) times daily (with meals). Refills:  0  
   
 multivitamin tablet Commonly known as:  ONE A DAY Dose:  1 Tab Take 1 Tab by mouth daily. Refills:  0  
   
 naloxone 1 mg/mL injection Commonly known as:  [employer] Dose:  2 mg  
2 mL by IntraMUSCular route once as needed for up to 1 dose. Quantity:  1 Syringe Refills:  0  
   
 TYLENOL EXTRA STRENGTH 500 mg tablet Generic drug:  acetaminophen Dose:  500 mg Take 500 mg by mouth every six (6) hours as needed for Pain. Refills:  0 Follow-up Information Follow up With Details Comments Contact Info Arian Rascon MD   [address] [address] 
[phone] David Ochoa MD Go to surgery , as scheduled [facility] [address] [facility] 
[phone] Discharge Instructions DISCHARGE DIAGNOSIS: 
Hyponatremia POA, multifactorial in etiology DM type 2 POA Essential HTN POA 
DDD lumbar spine POA Severe bipolar depression POA PTSD POA Hyperlipidemia POA MEDICATIONS: 
 · It is important that you take the medication exactly as they are prescribed. · Keep your medication in the bottles provided by the pharmacist and keep a list of the medication names, dosages, and times to be taken in your wallet. · Do not take other medications without consulting your doctor. Pain Management: per above medications What to do at [facility] Recommended diet:  Resume previous diet with lower water intake Recommended activity: Activity as tolerated If you have questions regarding the hospital related prescriptions or hospital related issues please call [address] [place] [address] at . You can always direct your questions to your primary care doctor if you are unable to reach your hospital physician; your PCP works as an extension of your hospital doctor just like your hospital doctor is an extension of your PCP for your time at the hospital [facility]). If you experience any of the following symptoms then please call your primary care physician or return to the emergency room if you cannot get hold of your doctor: 
Fever, chills, nausea, vomiting, diarrhea, change in mentation, falling, bleeding, shortness of breath NOTE THE CHANGE TO YOUR TRILEPTAL DOSING. It is now 300mg twice daily Chart Review Routing History Recipient Method Report Sent By Aroldo Rogers MD  
Fax: [phone] Phone: [phone] Fax AURY KELLEY MD NOTES AUTO ROUTING REPORT Jayne Ambrocio MD [[id number]] 2/21/2018 10:51 PM 02/21/2018 Jennifer Rogers MD  
Fax: [phone] Phone: [phone] Fax Alyn Claude MD NOTES AUTO ROUTING REPORT Jayne Ambrocio MD [[id number]] 2/22/2018  1:21 AM 02/22/2018

## 2018-02-21 NOTE — PERIOP NOTES
Incentive Spirometer        Using the incentive spirometer helps expand the small air sacs of your lungs, helps you breathe deeply, and helps improve your lung function. Use your incentive spirometer twice a day (10 breaths each time) prior to surgery. How to Use Your Incentive Spirometer:  1. Hold the incentive spirometer in an upright position. 2. Breathe out as usual.   3. Place the mouthpiece in your mouth and seal your lips tightly around it. 4. Take a deep breath. Breathe in slowly and as deeply as possible. Keep the blue flow rate guide between the arrows. 5. Hold your breath as long as possible. Then exhale slowly and allow the piston to fall to the bottom of the column. 6. Rest for a few seconds and repeat steps one through five at least 10 times. PAT Tidal Volume__2500________________  x_______1_________  Date____2/21/2018___________________    Dustin Flynnt THE INCENTIVE SPIROMETER WITH YOU TO THE HOSPITAL ON THE DAY OF YOUR SURGERY. Opportunity given to ask and answer questions as well as to observe return demonstration.   Karen Maciel signature_____________________________    Witness____________________________

## 2018-02-21 NOTE — ED NOTES
Assumed care of pt from triage. Pt resting in bed upright in gown and connected to monitor x 2. Pt is a diabetic. Pt denies feeling any \"different\"  Denies hx of seizures. Denies excessive drinking. Pt reports in the past she had low sodium from a medication she was on which was for depression. She is still on this.   She was placed off of this and placed back on 15 years ago

## 2018-02-22 LAB
ALBUMIN SERPL-MCNC: 3.3 G/DL (ref 3.5–5)
ALBUMIN/GLOB SERPL: 1 {RATIO} (ref 1.1–2.2)
ALP SERPL-CCNC: 79 U/L (ref 45–117)
ALT SERPL-CCNC: 23 U/L (ref 12–78)
ANION GAP SERPL CALC-SCNC: 9 MMOL/L (ref 5–15)
AST SERPL-CCNC: 12 U/L (ref 15–37)
BACTERIA SPEC CULT: NORMAL
BACTERIA SPEC CULT: NORMAL
BASOPHILS # BLD: 0 K/UL (ref 0–0.1)
BASOPHILS NFR BLD: 1 % (ref 0–1)
BILIRUB SERPL-MCNC: 0.3 MG/DL (ref 0.2–1)
BUN SERPL-MCNC: 6 MG/DL (ref 6–20)
BUN/CREAT SERPL: 13 (ref 12–20)
CALCIUM SERPL-MCNC: 8 MG/DL (ref 8.5–10.1)
CHLORIDE SERPL-SCNC: 92 MMOL/L (ref 97–108)
CK MB CFR SERPL CALC: NORMAL % (ref 0–2.5)
CK MB SERPL-MCNC: <1 NG/ML (ref 5–25)
CK SERPL-CCNC: 47 U/L (ref 26–192)
CO2 SERPL-SCNC: 25 MMOL/L (ref 21–32)
CORTIS AM PEAK SERPL-MCNC: 7.6 UG/DL (ref 4.3–22.4)
CREAT SERPL-MCNC: 0.47 MG/DL (ref 0.55–1.02)
DIFFERENTIAL METHOD BLD: ABNORMAL
EOSINOPHIL # BLD: 0.1 K/UL (ref 0–0.4)
EOSINOPHIL NFR BLD: 2 % (ref 0–7)
ERYTHROCYTE [DISTWIDTH] IN BLOOD BY AUTOMATED COUNT: 11.9 % (ref 11.5–14.5)
GLOBULIN SER CALC-MCNC: 3.4 G/DL (ref 2–4)
GLUCOSE BLD STRIP.AUTO-MCNC: 100 MG/DL (ref 65–100)
GLUCOSE BLD STRIP.AUTO-MCNC: 101 MG/DL (ref 65–100)
GLUCOSE BLD STRIP.AUTO-MCNC: 129 MG/DL (ref 65–100)
GLUCOSE BLD STRIP.AUTO-MCNC: 130 MG/DL (ref 65–100)
GLUCOSE SERPL-MCNC: 100 MG/DL (ref 65–100)
HCT VFR BLD AUTO: 32.9 % (ref 35–47)
HGB BLD-MCNC: 11.5 G/DL (ref 11.5–16)
IMM GRANULOCYTES # BLD: 0 K/UL (ref 0–0.04)
IMM GRANULOCYTES NFR BLD AUTO: 0 % (ref 0–0.5)
LYMPHOCYTES # BLD: 3.1 K/UL (ref 0.8–3.5)
LYMPHOCYTES NFR BLD: 42 % (ref 12–49)
MCH RBC QN AUTO: 27.6 PG (ref 26–34)
MCHC RBC AUTO-ENTMCNC: 35 G/DL (ref 30–36.5)
MCV RBC AUTO: 79.1 FL (ref 80–99)
MONOCYTES # BLD: 0.6 K/UL (ref 0–1)
MONOCYTES NFR BLD: 8 % (ref 5–13)
NEUTS SEG # BLD: 3.5 K/UL (ref 1.8–8)
NEUTS SEG NFR BLD: 48 % (ref 32–75)
NRBC # BLD: 0 K/UL (ref 0–0.01)
NRBC BLD-RTO: 0 PER 100 WBC
PLATELET # BLD AUTO: 282 K/UL (ref 150–400)
PMV BLD AUTO: 9.7 FL (ref 8.9–12.9)
POTASSIUM SERPL-SCNC: 3.9 MMOL/L (ref 3.5–5.1)
PROT SERPL-MCNC: 6.7 G/DL (ref 6.4–8.2)
RBC # BLD AUTO: 4.16 M/UL (ref 3.8–5.2)
SERVICE CMNT-IMP: ABNORMAL
SERVICE CMNT-IMP: NORMAL
SERVICE CMNT-IMP: NORMAL
SODIUM SERPL-SCNC: 126 MMOL/L (ref 136–145)
TSH SERPL DL<=0.05 MIU/L-ACNC: 5.7 UIU/ML (ref 0.36–3.74)
WBC # BLD AUTO: 7.3 K/UL (ref 3.6–11)

## 2018-02-22 PROCEDURE — 82533 TOTAL CORTISOL: CPT | Performed by: INTERNAL MEDICINE

## 2018-02-22 PROCEDURE — 36415 COLL VENOUS BLD VENIPUNCTURE: CPT | Performed by: INTERNAL MEDICINE

## 2018-02-22 PROCEDURE — 65270000029 HC RM PRIVATE

## 2018-02-22 PROCEDURE — 82962 GLUCOSE BLOOD TEST: CPT

## 2018-02-22 PROCEDURE — 74011250637 HC RX REV CODE- 250/637: Performed by: EMERGENCY MEDICINE

## 2018-02-22 PROCEDURE — 84443 ASSAY THYROID STIM HORMONE: CPT | Performed by: INTERNAL MEDICINE

## 2018-02-22 PROCEDURE — 85025 COMPLETE CBC W/AUTO DIFF WBC: CPT | Performed by: INTERNAL MEDICINE

## 2018-02-22 PROCEDURE — 82550 ASSAY OF CK (CPK): CPT | Performed by: INTERNAL MEDICINE

## 2018-02-22 PROCEDURE — 74011250636 HC RX REV CODE- 250/636: Performed by: INTERNAL MEDICINE

## 2018-02-22 PROCEDURE — 74011250637 HC RX REV CODE- 250/637: Performed by: INTERNAL MEDICINE

## 2018-02-22 PROCEDURE — 74011250637 HC RX REV CODE- 250/637: Performed by: PSYCHIATRY & NEUROLOGY

## 2018-02-22 PROCEDURE — 80053 COMPREHEN METABOLIC PANEL: CPT | Performed by: INTERNAL MEDICINE

## 2018-02-22 RX ORDER — TRAZODONE HYDROCHLORIDE 50 MG/1
50 TABLET ORAL
Status: DISCONTINUED | OUTPATIENT
Start: 2018-02-22 | End: 2018-02-23 | Stop reason: HOSPADM

## 2018-02-22 RX ORDER — PREGABALIN 150 MG/1
150 CAPSULE ORAL ONCE
Status: CANCELLED | OUTPATIENT
Start: 2018-03-05 | End: 2018-03-05

## 2018-02-22 RX ORDER — SODIUM CHLORIDE, SODIUM LACTATE, POTASSIUM CHLORIDE, CALCIUM CHLORIDE 600; 310; 30; 20 MG/100ML; MG/100ML; MG/100ML; MG/100ML
25 INJECTION, SOLUTION INTRAVENOUS CONTINUOUS
Status: CANCELLED | OUTPATIENT
Start: 2018-03-05

## 2018-02-22 RX ORDER — OXCARBAZEPINE 150 MG/1
150 TABLET, FILM COATED ORAL 2 TIMES DAILY
Status: DISCONTINUED | OUTPATIENT
Start: 2018-02-22 | End: 2018-02-23 | Stop reason: HOSPADM

## 2018-02-22 RX ORDER — ACETAMINOPHEN 10 MG/ML
1000 INJECTION, SOLUTION INTRAVENOUS ONCE
Status: CANCELLED | OUTPATIENT
Start: 2018-03-05 | End: 2018-03-05

## 2018-02-22 RX ORDER — TRAZODONE HYDROCHLORIDE 50 MG/1
50 TABLET ORAL
Status: DISCONTINUED | OUTPATIENT
Start: 2018-02-22 | End: 2018-02-22

## 2018-02-22 RX ORDER — CLONAZEPAM 1 MG/1
1 TABLET ORAL ONCE
Status: COMPLETED | OUTPATIENT
Start: 2018-02-22 | End: 2018-02-22

## 2018-02-22 RX ADMIN — CLONAZEPAM 1.5 MG: 1 TABLET ORAL at 17:27

## 2018-02-22 RX ADMIN — ENOXAPARIN SODIUM 40 MG: 40 INJECTION SUBCUTANEOUS at 08:20

## 2018-02-22 RX ADMIN — OXCARBAZEPINE 150 MG: 150 TABLET ORAL at 22:54

## 2018-02-22 RX ADMIN — TRAZODONE HYDROCHLORIDE 25 MG: 50 TABLET ORAL at 00:52

## 2018-02-22 RX ADMIN — METFORMIN HYDROCHLORIDE 1000 MG: 500 TABLET, FILM COATED ORAL at 17:24

## 2018-02-22 RX ADMIN — CLONAZEPAM 1 MG: 1 TABLET ORAL at 00:52

## 2018-02-22 RX ADMIN — SODIUM CHLORIDE 75 ML/HR: 900 INJECTION, SOLUTION INTRAVENOUS at 05:52

## 2018-02-22 RX ADMIN — GABAPENTIN 300 MG: 300 CAPSULE ORAL at 17:25

## 2018-02-22 RX ADMIN — METFORMIN HYDROCHLORIDE 1000 MG: 500 TABLET, FILM COATED ORAL at 08:09

## 2018-02-22 RX ADMIN — TRAZODONE HYDROCHLORIDE 50 MG: 50 TABLET ORAL at 22:02

## 2018-02-22 RX ADMIN — MELOXICAM 15 MG: 7.5 TABLET ORAL at 21:52

## 2018-02-22 RX ADMIN — OXYCODONE HYDROCHLORIDE AND ACETAMINOPHEN 1 TABLET: 5; 325 TABLET ORAL at 15:45

## 2018-02-22 RX ADMIN — CLONAZEPAM 0.5 MG: 0.5 TABLET ORAL at 11:30

## 2018-02-22 RX ADMIN — LISINOPRIL 20 MG: 20 TABLET ORAL at 08:17

## 2018-02-22 RX ADMIN — OXYCODONE HYDROCHLORIDE AND ACETAMINOPHEN 1 TABLET: 5; 325 TABLET ORAL at 06:01

## 2018-02-22 RX ADMIN — LURASIDONE HYDROCHLORIDE 80 MG: 40 TABLET, FILM COATED ORAL at 08:10

## 2018-02-22 RX ADMIN — LURASIDONE HYDROCHLORIDE 80 MG: 40 TABLET, FILM COATED ORAL at 21:53

## 2018-02-22 RX ADMIN — SODIUM CHLORIDE 75 ML/HR: 900 INJECTION, SOLUTION INTRAVENOUS at 22:24

## 2018-02-22 RX ADMIN — CLONAZEPAM 1.5 MG: 1 TABLET ORAL at 08:09

## 2018-02-22 RX ADMIN — GABAPENTIN 300 MG: 300 CAPSULE ORAL at 08:09

## 2018-02-22 RX ADMIN — BACLOFEN 10 MG: 10 TABLET ORAL at 20:44

## 2018-02-22 RX ADMIN — LURASIDONE HYDROCHLORIDE 80 MG: 40 TABLET, FILM COATED ORAL at 01:49

## 2018-02-22 RX ADMIN — ENOXAPARIN SODIUM 40 MG: 40 INJECTION SUBCUTANEOUS at 20:41

## 2018-02-22 RX ADMIN — Medication 10 ML: at 08:09

## 2018-02-22 RX ADMIN — Medication 10 ML: at 15:47

## 2018-02-22 RX ADMIN — ATORVASTATIN CALCIUM 40 MG: 40 TABLET, FILM COATED ORAL at 08:09

## 2018-02-22 RX ADMIN — OXYCODONE HYDROCHLORIDE AND ACETAMINOPHEN 1 TABLET: 5; 325 TABLET ORAL at 22:02

## 2018-02-22 NOTE — PROGRESS NOTES
Patient request for her Klonopin and latuda for depression. Patient also request 50 mg tradazone to help her sleep. Dr. Deedee Deleon paged and informed of patient request.  Dr. Deedee Deleon placed orders.

## 2018-02-22 NOTE — ED NOTES
Pt sitting up in bed at this time. Pt eating dinner with family at bedside. Pt denies any pain or discomfort. Will continue to monitor for changes.

## 2018-02-22 NOTE — PROGRESS NOTES
TRANSFER - IN REPORT:    Verbal report received from [facility] CARLA RN(name) on Agnes Maciel  being received from ED (unit) for routine progression of care      Report consisted of patients Situation, Background, Assessment and   Recommendations(SBAR). Information from the following report(s) SBAR, Kardex, ED Summary, Intake/Output, MAR and Recent Results was reviewed with the receiving nurse. Opportunity for questions and clarification was provided. Assessment completed upon patients arrival to unit and care assumed.

## 2018-02-22 NOTE — PROGRESS NOTES
Spiritual Care Partner Volunteer visited patient in Ortho on Fe. 22, 2018.   Documented by:  ABDULLAHI De, [facility], marisela KRAUSE [facility] Paging Service  [phone]

## 2018-02-22 NOTE — ED NOTES
TRANSFER - OUT REPORT:      Verbal report given to Candie(name) on Macario Oliveira  being transferred to [facility](unit) for routine progression of care       Report consisted of patients Situation, Background, Assessment and   Recommendations(SBAR). Information from the following report(s) SBAR, Kardex, ED Summary, OR Summary and Procedure Summary was reviewed with the receiving nurse. Lines:   Peripheral IV 02/21/18 Left Antecubital (Active)   Site Assessment Clean, dry, & intact 2/21/2018  6:24 PM   Phlebitis Assessment 0 2/21/2018  6:24 PM   Infiltration Assessment 0 2/21/2018  6:24 PM   Dressing Status Clean, dry, & intact 2/21/2018  6:24 PM   Dressing Type Transparent;Tape 2/21/2018  6:24 PM   Hub Color/Line Status Pink 2/21/2018  6:24 PM        Opportunity for questions and clarification was provided.       Patient transported with:   [employer]

## 2018-02-22 NOTE — PROGRESS NOTES
Problem: Falls - Risk of  Goal: *Absence of Falls  Document Hira Fall Risk and appropriate interventions in the flowsheet.    Fall Risk Interventions:            Medication Interventions: Patient to call before getting OOB, Teach patient to arise slowly

## 2018-02-22 NOTE — ED NOTES
Bedside shift change report given to [address]  (oncoming nurse) by Sola Torres 5 RN (offgoing nurse). Report included the following information SBAR, Kardex, ED Summary and MAR. Pt resting in bed, alert and oriented, upset about being admitted.

## 2018-02-22 NOTE — H&P
[facility]  ACUTE CARE HISTORY AND PHYSICAL    Jatinder Heredia  MR#: [id number]  : 1970  ACCOUNT #: [de-identified]   DATE OF SERVICE: 2018        [facility] [place]  D: 2018 22:50     T: 2018 23:22  JOB #: [id number]

## 2018-02-22 NOTE — H&P
Hospitalist Admission Note    NAME:  Yolande Barger   :   1970   MRN:   [id number]     Date of admit: 2018    PCP: Ralph Cordova MD     PSYCHIATRY PRACTITIONER:  GENE Morales at [facility]. 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 SIADH from another cause  Gentle IVF  Po fluid restrict to 1000 cc  Serial labs  Stop tripletal    DM type 2 POA  Supposed to take lantus 10 units at bedtime, does not take  Uses humalog 12 units with dinner  Diabetic diet  SSI  Check HgBa1c  Continue neurontin    Essential HTN POA  Continue lisinopril  PRN hydralazine    DDD lumbar spine POA  Currently maintained on neurontin  Scheduled for OR on 3/8/2018    Severe bipolar depression POA  PTSD POA  Stop trilpetal, pt very anxious about this  Increase klonopin to 1.5 mg PO BID  Ask psychiatry for help replacing the trileptal    Hyperlipidemia POA continue statin    Morbid obesity POA Body mass index is 48.87 kg/(m^2). Admit to inpatient appropriate due to high risk of decompensation if discharged from the ED    DVT prophylaxis with lovenox    Code status: full code  NOK:     History     CHIEF COMPLAINT: sent from pre-op testing with a sodium of 118    HISTORY OF PRESENT ILLNESS:A 77-year-old white female with known severe bipolar disorder, currently stabilized on Klonopin, Trileptal and Latuda. She notes she had a history of hyponatremia approximately 15 years ago on the Trileptal and it was stopped for several years. She subsequently resumed it because it was the most efficacious drug for her and she has been on it since that time.   She is not aware of any low sodium levels and says they have been checked periodically, the last sodium level we have in the computer was 136 from 2012. She says for the last couple of months she has had some cold intolerance and some hand swelling over the past month, but otherwise has been feeling well. She has not had any nausea, vomiting, chest pain, abdominal pain, cough, shortness of breath, diarrhea, melena or bright red blood per rectum, no unusual headaches, no changes in her mental status. She has been having degenerative disk disease of the lumbar spine and is scheduled to have a laminectomy at L4, L5 in several weeks. She came in today for preop testing and was called back when her sodium level came back at 118, she was referred to the emergency room.     In the emergency room, the patient was awake, alert, oriented x3, she was in no distress. She was a little bit tearful when we talked about stopping the Trileptal.  She denies any recent changes to her medication regimen.   We were called to admit the patient.        Past Medical History:   Diagnosis Date    Bipolar affective disorder ([facility] [address]     Dissociative identity disorder     Hypertension     Ill-defined condition     hyperlipidemia    Morbid obesity ([facility] [address]     NIDDM (non-insulin dependent diabetes mellitus)     Psychiatric disorder     Depression/Anxiety    PTSD (post-traumatic stress disorder)         Past Surgical History:   Procedure Laterality Date    DELIVERY       HX BACK SURGERY      HX BREAST BIOPSY Left     u/s benign biopsy 3 years ago    HX CHOLECYSTECTOMY      HX TONSILLECTOMY      NM REPAIR ACHILLES TENDON,PRIMARY         Social History   Substance Use Topics    Smoking status: Never Smoker    Smokeless tobacco: Never Used    Alcohol use No        Family History   Problem Relation Age of Onset    Breast Cancer Mother 46    Lung Disease Mother     Heart Disease Mother     Hypertension Mother     Cancer Mother     Breast Cancer Sister 43    Breast Cancer Maternal Grandmother 52      from breast cancer    Breast Cancer Maternal Aunt      6, in 42's, 4  from breast cancer    Breast Cancer Cousin      21 maternal cousins    Heart Disease Father     Diabetes Father     Hypertension Father    2 children healthy    Allergies   Allergen Reactions    Darvon [Propoxyphene] Rash        Prior to Admission medications    Medication Sig Start Date End Date Taking? Authorizing Provider   atorvastatin (LIPITOR) 40 mg tablet Take 40 mg by mouth daily. Yes Historical Provider   baclofen (LIORESAL) 10 mg tablet Take 10 mg by mouth every evening. Yes Historical Provider   gabapentin (NEURONTIN) 300 mg capsule Take 300 mg by mouth two (2) times a day. Yes Historical Provider   insulin lispro (HUMALOG) 100 unit/mL kwikpen 8 Units by SubCUTAneous route Before breakfast and dinner. Yes Historical Provider   acetaminophen (TYLENOL EXTRA STRENGTH) 500 mg tablet Take 500 mg by mouth every six (6) hours as needed for Pain. Yes Historical Provider   clonazePAM (KLONOPIN) 1 mg tablet Take 1 mg by mouth two (2) times a day. Administration Instructions:  Patient takes 1 mg in the morning and evening and 0.5 mg in the afternoon. Yes Historical Provider   clonazePAM (KLONOPIN) 1 mg tablet Take 0.5 mg by mouth daily (with lunch). Administration Instructions:  Patient takes 1 mg in the morning and evening and 0.5 mg in the afternoon. Yes Historical Provider   lurasidone (LATUDA) 80 mg tab tablet Take 80 mg by mouth daily. Yes Historical Provider   meloxicam (MOBIC) 15 mg tablet Take 15 mg by mouth daily. Yes Historical Provider   metFORMIN (GLUCOPHAGE) 1,000 mg tablet Take 1,000 mg by mouth two (2) times daily (with meals). Yes Historical Provider   ergocalciferol (VITAMIN D2) 50,000 unit capsule Take 50,000 Units by mouth two (2) times a week.  on Monday and Thursday   Yes Historical Provider   OXcarbaxepine (TRILEPTAL) 600 mg tablet Take 600 mg by mouth three (3) times daily. Yes Jairo Rasheed MD   multivitamin (ONE A DAY) tablet Take 1 Tab by mouth daily. Yes Jairo Rasheed MD   lisinopril (PRINIVIL, ZESTRIL) 20 mg tablet Take 20 mg by mouth daily. Yes Jairo Rasheed MD   naloxone (NARCAN) 1 mg/mL injection 2 mL by IntraMUSCular route once as needed for up to 1 dose.  17   GENE Amador       Review of symptoms:     POSITIVE= Bold  Negative = not bold  General:  fever, chills, sweats, generalized weakness, weight loss     loss of appetite   Eyes:    blurred vision, eye pain, double vision  ENT:    Coryza, sore throat, trouble swallowing  Respiratory:   cough, sputum, SOB  Cardiology:   chest pain, orthopnea, PND, edema  Gastrointestinal:  abdominal pain , N/V, diarrhea, constipation, melena or BRBPR  Genitourinary:  Urgency, dysuria, hematuria  Muskuloskeletal :  Joint redness, swelling or acute joint pain, myalgias     Hand swelling bilaterally  Hematology:  easy bruising, nose or gum bleeding  Dermatological: rash, ulceration  Endocrine:   Polyuria or polydipsia, heat or cold intolerance  Neurological:  Headache, focal motor or sensory changes     Speech difficulties, memory loss  Psychological: depression, agitation      Objective:   VITALS:    Patient Vitals for the past 24 hrs:   Temp Pulse Resp BP SpO2   18 2145 - 77 16 122/59 95 %   188 - 68 17 - 95 %   18 - - - 132/60 -   18 2100 - - - 130/60 96 %   18 - - - 129/66 97 %   18 - - - 128/85 97 %   18 - - - - 96 %   18 - - - 128/79 96 %   18 1900 - - - 138/78 98 %   18 1857 - - - - 98 %   18 - - - 141/83 97 %   18 184 - - - 140/77 98 %   18 1826 - - - - 96 %   18 1520 98.1 °F (36.7 °C) 74 16 162/84 99 %     Temp (24hrs), Av.2 °F (36.8 °C), Min:98.1 °F (36.7 °C), Max:98.3 °F (36.8 °C)      O2 Device: Room air    PHYSICAL EXAM:   General:    Alert, cooperative in no distress HEENT: Normocephalic, atraumatic    PERRL, EOMI  Sclera no icterus    Nasal mucosa without masses or discharge  Hearing intact to voice    Oropharynx without erythema or exudate  No thrush  Pink MM  Neck:  No meningismus, trachea midline, no carotid bruits     Thyroid not enlarged, no nodules or tenderness  Lungs:   Clear to auscultation bilaterally. No wheezing or rales    No accessory muscle use or retractions. Heart:   Regular rate and rhythm,  no murmur or gallop. No LE edema  Abdomen:   Soft, non-tender. Not distended. Bowel sounds normal.     No masses, No Hepatosplenomegaly, No Rebound or guarding  Lymph nodes: No cervical or inguinal BUSHRA  Musculoskeletal:  No Joint swelling, erythema, warmth.  No Cyanosis or clubbing  Skin:      No rashes     Not Jaundiced   No nodules or thickening  Neurologic: Alert and oriented X 3, follows commands     Cranial nerves 2 to 12 intact    Symmetric motor strength bilaterally       LAB DATA REVIEWED:    Recent Results (from the past 12 hour(s))   CBC W/O DIFF    Collection Time: 02/21/18  1:15 PM   Result Value Ref Range    WBC 7.2 3.6 - 11.0 K/uL    RBC 4.52 3.80 - 5.20 M/uL    HGB 12.7 11.5 - 16.0 g/dL    HCT 35.3 35.0 - 47.0 %    MCV 78.1 (L) 80.0 - 99.0 FL    MCH 28.1 26.0 - 34.0 PG    MCHC 36.0 30.0 - 36.5 g/dL    RDW 11.9 11.5 - 14.5 %    PLATELET 184 819 - 644 K/uL    MPV 10.9 8.9 - 12.9 FL    NRBC 0.0 0  WBC    ABSOLUTE NRBC 0.00 0.00 - 0.01 K/uL   PROTHROMBIN TIME + INR    Collection Time: 02/21/18  1:15 PM   Result Value Ref Range    INR 1.0 0.9 - 1.1      Prothrombin time 10.4 9.0 - 26.7 sec   METABOLIC PANEL, COMPREHENSIVE    Collection Time: 02/21/18  1:15 PM   Result Value Ref Range    Sodium 118 (LL) 136 - 145 mmol/L    Potassium 4.1 3.5 - 5.1 mmol/L    Chloride 85 (L) 97 - 108 mmol/L    CO2 26 21 - 32 mmol/L    Anion gap 7 5 - 15 mmol/L    Glucose 104 (H) 65 - 100 mg/dL    BUN 6 6 - 20 MG/DL    Creatinine 0.54 (L) 0.55 - 1.02 MG/DL BUN/Creatinine ratio 11 (L) 12 - 20      GFR est AA >60 >60 ml/min/1.73m2    GFR est non-AA >60 >60 ml/min/1.73m2    Calcium 8.3 (L) 8.5 - 10.1 MG/DL    Bilirubin, total 0.3 0.2 - 1.0 MG/DL    ALT (SGPT) 24 12 - 78 U/L    AST (SGOT) 13 (L) 15 - 37 U/L    Alk.  phosphatase 92 45 - 117 U/L    Protein, total 7.8 6.4 - 8.2 g/dL    Albumin 3.9 3.5 - 5.0 g/dL    Globulin 3.9 2.0 - 4.0 g/dL    A-G Ratio 1.0 (L) 1.1 - 2.2     PREALBUMIN    Collection Time: 02/21/18  1:15 PM   Result Value Ref Range    Prealbumin 34.3 20.0 - 40.0 mg/dL   TYPE & SCREEN    Collection Time: 02/21/18  1:16 PM   Result Value Ref Range    Crossmatch Expiration 03/07/2018     ABO/Rh(D) A POSITIVE     Antibody screen NEG    HEMOGLOBIN A1C WITH EAG    Collection Time: 02/21/18  1:16 PM   Result Value Ref Range    Hemoglobin A1c 6.5 (H) 4.2 - 6.3 %    Est. average glucose 140 mg/dL   URINALYSIS W/ REFLEX CULTURE    Collection Time: 02/21/18  1:16 PM   Result Value Ref Range    Color YELLOW/STRAW      Appearance CLEAR CLEAR      Specific gravity 1.010 1.003 - 1.030      pH (UA) 7.0 5.0 - 8.0      Protein NEGATIVE  NEG mg/dL    Glucose NEGATIVE  NEG mg/dL    Ketone NEGATIVE  NEG mg/dL    Bilirubin NEGATIVE  NEG      Blood NEGATIVE  NEG      Urobilinogen 0.2 0.2 - 1.0 EU/dL    Nitrites NEGATIVE  NEG      Leukocyte Esterase NEGATIVE  NEG      WBC 0-4 0 - 4 /hpf    RBC 0-5 0 - 5 /hpf    Epithelial cells FEW FEW /lpf    Bacteria NEGATIVE  NEG /hpf    UA:UC IF INDICATED CULTURE NOT INDICATED BY UA RESULT CNI      Hyaline cast 0-2 0 - 5 /lpf   VITAMIN D, 25 HYDROXY    Collection Time: 02/21/18  1:16 PM   Result Value Ref Range    Vitamin D 25-Hydroxy 109.6 (H) 30 - 100 ng/mL   EKG, 12 LEAD, INITIAL    Collection Time: 02/21/18  3:24 PM   Result Value Ref Range    Ventricular Rate 66 BPM    Atrial Rate 66 BPM    P-R Interval 176 ms    QRS Duration 86 ms    Q-T Interval 424 ms    QTC Calculation (Bezet) 444 ms    Calculated P Axis 28 degrees    Calculated R Axis 10 degrees    Calculated T Axis 16 degrees    Diagnosis       Normal sinus rhythm  No previous ECGs available  Confirmed by Debbi Matthews ([id number]) on 2/21/2018 6:10:44 PM     CBC WITH AUTOMATED DIFF    Collection Time: 02/21/18  4:49 PM   Result Value Ref Range    WBC 9.4 3.6 - 11.0 K/uL    RBC 4.78 3.80 - 5.20 M/uL    HGB 13.1 11.5 - 16.0 g/dL    HCT 38.1 35.0 - 47.0 %    MCV 79.7 (L) 80.0 - 99.0 FL    MCH 27.4 26.0 - 34.0 PG    MCHC 34.4 30.0 - 36.5 g/dL    RDW 11.7 11.5 - 14.5 %    PLATELET 535 996 - 482 K/uL    MPV 9.5 8.9 - 12.9 FL    NRBC 0.2 (H) 0  WBC    ABSOLUTE NRBC 0.02 (H) 0.00 - 0.01 K/uL    NEUTROPHILS 61 32 - 75 %    LYMPHOCYTES 30 12 - 49 %    MONOCYTES 6 5 - 13 %    EOSINOPHILS 1 0 - 7 %    BASOPHILS 1 0 - 1 %    IMMATURE GRANULOCYTES 0 0.0 - 0.5 %    ABS. NEUTROPHILS 5.7 1.8 - 8.0 K/UL    ABS. LYMPHOCYTES 2.8 0.8 - 3.5 K/UL    ABS. MONOCYTES 0.6 0.0 - 1.0 K/UL    ABS. EOSINOPHILS 0.1 0.0 - 0.4 K/UL    ABS. BASOPHILS 0.1 0.0 - 0.1 K/UL    ABS. IMM. GRANS. 0.0 0.00 - 0.04 K/UL    DF AUTOMATED     METABOLIC PANEL, COMPREHENSIVE    Collection Time: 02/21/18  4:49 PM   Result Value Ref Range    Sodium 120 (L) 136 - 145 mmol/L    Potassium 4.0 3.5 - 5.1 mmol/L    Chloride 84 (L) 97 - 108 mmol/L    CO2 26 21 - 32 mmol/L    Anion gap 10 5 - 15 mmol/L    Glucose 109 (H) 65 - 100 mg/dL    BUN 5 (L) 6 - 20 MG/DL    Creatinine 0.62 0.55 - 1.02 MG/DL    BUN/Creatinine ratio 8 (L) 12 - 20      GFR est AA >60 >60 ml/min/1.73m2    GFR est non-AA >60 >60 ml/min/1.73m2    Calcium 8.8 8.5 - 10.1 MG/DL    Bilirubin, total 0.4 0.2 - 1.0 MG/DL    ALT (SGPT) 25 12 - 78 U/L    AST (SGOT) 15 15 - 37 U/L    Alk.  phosphatase 98 45 - 117 U/L    Protein, total 8.2 6.4 - 8.2 g/dL    Albumin 4.2 3.5 - 5.0 g/dL    Globulin 4.0 2.0 - 4.0 g/dL    A-G Ratio 1.1 1.1 - 2.2     MAGNESIUM    Collection Time: 02/21/18  4:49 PM   Result Value Ref Range    Magnesium 2.0 1.6 - 2.4 mg/dL       EKG as read by me shows NSR rate 66, normal axis, no LVH, no ischemic ST-T changes      CXR read by radiology and reviewed by myself shows FINDINGS:   PA and lateral radiographs of the chest demonstrate clear lungs. The  cardiac and mediastinal contours and pulmonary vascularity are normal.  The  bones and soft tissues are within normal limits. IMPRESSION: Normal chest x-ray. I saw the patient personally, took a history and did a complete physical exam at the bedside. I performed complex decision making in coming up with a diagnostic and treatment plan for the patient. I reviewed the patient's past medical records, current laboratory and radiology results, and actual Xray films/EKG. I have also discussed this case with the involved ED physician.     Care Plan discussed with:    Patient, , ED Doc    Risk of deterioration:  High    Total Time Coordinating Admission:   65  minutes    Total Critical Care Time:         Roslyn Grande MD

## 2018-02-22 NOTE — PROGRESS NOTES
Lovenox 40 mg sc daily ordered for DVT ppx  Will change to lovenox 40 mg sc every 12 hr for BMI > Wilmerza Paige [address] [place] [address]

## 2018-02-22 NOTE — CONSULTS
PSYCHIATRIC CONSULTATION                         IDENTIFICATION:    Patient Name  Orlando Reyes   Date of Birth 1970   [facility] [id number]   Medical Record Number  [id number]      Age  41 y.o. PCP Jamilah Pedraza MD   Admit date:  2/21/2018    Room Number  [id number]  @ [facility]   Date of Service  2/22/2018            HISTORY         REASON FOR HOSPITALIZATION/CONSULTATION:  A psychiatric consultation was requested by Suzie Iglesias MD to evaluate or provide advice/opinion related to evaluating meds for bipolar disorder  CC: \"i am doing okay\"    HISTORY OF PRESENT ILLNESS:    The patient, Orlando Reyes, is a 41 y.o. WHITE [place]  female with a past psychiatric history significant for Bipolar disorder, PTSD and DID  , who was admitted to the medical floor for the treatment of hyponatremia. Pt seen today for evaluation. Pt is alert and OX3. She does not report sx due to hyponatremia- no HA/confusion/lethargy/muscle cramp. Pt had a pre admission labs done for back fusion surgery and low sodium was an incidental finding- her sodium has now increased from 120- to 126. She has been taking Trileptal 600 mg tid for almost 10 years with good response and states she get her level checked at her PCP every 3 months and was not reported of any abnormal level in the recent past. She has hx of hyponatremia 15 years ago and med were stopped then, she was re started again and has done well on it. She reports hx of bipolar with no and depressive sx , no lasting few days. She also has been diagnosed with DID and PTSD. She is stable with her mood except for anxiety over stopping her medication. She denies psychosis or no currently. She denies SI/HI/AVH. ALLERGIES:  Allergies   Allergen Reactions    Darvon [Propoxyphene] Rash      MEDICATIONS PRIOR TO ADMISSION:  Prescriptions Prior to Admission   Medication Sig    atorvastatin (LIPITOR) 40 mg tablet Take 40 mg by mouth daily.     baclofen (LIORESAL) 10 mg tablet Take 10 mg by mouth every evening.  gabapentin (NEURONTIN) 300 mg capsule Take 300 mg by mouth two (2) times a day.  insulin lispro (HUMALOG) 100 unit/mL kwikpen 8 Units by SubCUTAneous route Before breakfast and dinner.  acetaminophen (TYLENOL EXTRA STRENGTH) 500 mg tablet Take 500 mg by mouth every six (6) hours as needed for Pain.  clonazePAM (KLONOPIN) 1 mg tablet Take 1 mg by mouth two (2) times a day. Administration Instructions:  Patient takes 1 mg in the morning and evening and 0.5 mg in the afternoon.  clonazePAM (KLONOPIN) 1 mg tablet Take 0.5 mg by mouth daily (with lunch). Administration Instructions:  Patient takes 1 mg in the morning and evening and 0.5 mg in the afternoon.  lurasidone (LATUDA) 80 mg tab tablet Take 80 mg by mouth daily.  meloxicam (MOBIC) 15 mg tablet Take 15 mg by mouth daily.  metFORMIN (GLUCOPHAGE) 1,000 mg tablet Take 1,000 mg by mouth two (2) times daily (with meals).  ergocalciferol (VITAMIN D2) 50,000 unit capsule Take 50,000 Units by mouth two (2) times a week. on Monday and Thursday    OXcarbaxepine (TRILEPTAL) 600 mg tablet Take 600 mg by mouth three (3) times daily.  multivitamin (ONE A DAY) tablet Take 1 Tab by mouth daily.  lisinopril (PRINIVIL, ZESTRIL) 20 mg tablet Take 20 mg by mouth daily.  naloxone (NARCAN) 1 mg/mL injection 2 mL by IntraMUSCular route once as needed for up to 1 dose.       PAST MEDICAL HISTORY:  Past Medical History:   Diagnosis Date    Bipolar affective disorder ([facility] [address]     Dissociative identity disorder     Hypertension     Ill-defined condition     hyperlipidemia    Morbid obesity ([facility] [address]     NIDDM (non-insulin dependent diabetes mellitus)     Psychiatric disorder     Depression/Anxiety    PTSD (post-traumatic stress disorder)      Past Surgical History:   Procedure Laterality Date    DELIVERY       HX BACK SURGERY      HX BREAST BIOPSY Left     u/s benign biopsy 3 years ago    HX CHOLECYSTECTOMY      HX TONSILLECTOMY      DE REPAIR ACHILLES TENDON,PRIMARY        SOCIAL HISTORY:   Social History     Social History    Marital status:      Spouse name: N/A    Number of children: N/A    Years of education: N/A     Occupational History    Not on file. Social History Main Topics    Smoking status: Never Smoker    Smokeless tobacco: Never Used    Alcohol use No    Drug use: No    Sexual activity: Yes     Partners: Male     Birth control/ protection: None     Other Topics Concern    Not on file     Social History Narrative      FAMILY HISTORY: History reviewed. No pertinent family history. Family History   Problem Relation Age of Onset    Breast Cancer Mother 46    Lung Disease Mother     Heart Disease Mother     Hypertension Mother     Cancer Mother     Breast Cancer Sister 43    Breast Cancer Maternal Grandmother 52      from breast cancer    Breast Cancer Maternal Aunt      6, in 42's, 4  from breast cancer    Breast Cancer Cousin      21 maternal cousins    Heart Disease Father     Diabetes Father     Hypertension Father        REVIEW of SYSTEMS:   Psychological ROS: positive for - anxiety  negative for - disorientation, hallucinations or suicidal ideation  Pertinent items are noted in the History of Present Illness. All other Systems reviewed and are considered negative. MENTAL STATUS EXAM & VITALS       MENTAL STATUS EXAM (MSE):    MSE FINDINGS ARE WITHIN NORMAL LIMITS (WNL) UNLESS OTHERWISE STATED BELOW. Orientation oriented to time, place and person   Vital Signs (BP,Pulse, Temp) See below (reviewed 2018); vital signs are WNL if not listed below.    Gait and Station Stable, no ataxia   Abnormal Muscular Movements/Tone/Behavior No EPS, no Tardive Dyskinesia, no abnormal muscular movements; wnl tone   Relations cooperative   General Appearance:  age appropriate   Language No aphasia or dysarthria   Speech: monotone   Thought Processes logical, wnl rate of thoughts, good abstract reasoning and computation   Thought Associations goal directed   Thought Content free of delusions, free of hallucinations and preoccupations   Suicidal Ideations none   Homicidal Ideations none   Mood:  stable   Affect:  anxious, euthymic and mood-congruent   Memory recent  adequate   Memory remote:  adequate   Concentration/Attention:  adequate   Fund of Knowledge average   Insight:  fair   Reliability fair   Judgment:  fair            VITALS:     Patient Vitals for the past 24 hrs:   Temp Pulse Resp BP SpO2   02/22/18 1450 98.1 °F (36.7 °C) 76 16 125/64 100 %   02/22/18 0931 97.8 °F (36.6 °C) 75 16 108/50 97 %   02/22/18 0817 - 68 - 134/81 -   02/22/18 0549 97.2 °F (36.2 °C) 68 16 106/52 99 %   02/22/18 0230 97.4 °F (36.3 °C) 69 16 115/50 97 %   02/21/18 2351 98 °F (36.7 °C) 75 15 140/71 97 %   02/21/18 2245 - 77 15 132/68 94 %   02/21/18 2230 - 81 15 128/70 95 %   02/21/18 2215 - 77 17 110/57 94 %   02/21/18 2145 - 77 16 122/59 95 %   02/21/18 2128 - 68 17 - 95 %   02/21/18 2115 - - - 132/60 -   02/21/18 2100 - - - 130/60 96 %   02/21/18 2045 - - - 129/66 97 %   02/21/18 1930 - - - 128/85 97 %   02/21/18 1918 - - - - 96 %   02/21/18 1915 - - - 128/79 96 %   02/21/18 1900 - - - 138/78 98 %   02/21/18 1857 - - - - 98 %   02/21/18 1845 - - - 141/83 97 %   02/21/18 1842 - - - 140/77 98 %   02/21/18 1826 - - - - 96 %              DATA     LABORATORY DATA:  Labs Reviewed   CBC WITH AUTOMATED DIFF - Abnormal; Notable for the following:        Result Value    MCV 79.7 (*)     NRBC 0.2 (*)     ABSOLUTE NRBC 0.02 (*)     All other components within normal limits   METABOLIC PANEL, COMPREHENSIVE - Abnormal; Notable for the following:     Sodium 120 (*)     Chloride 84 (*)     Glucose 109 (*)     BUN 5 (*)     BUN/Creatinine ratio 8 (*)     All other components within normal limits   METABOLIC PANEL, COMPREHENSIVE - Abnormal; Notable for the following:     Sodium 126 (*)     Chloride 92 (*)     Creatinine 0.47 (*)     Calcium 8.0 (*)     AST (SGOT) 12 (*)     Albumin 3.3 (*)     A-G Ratio 1.0 (*)     All other components within normal limits   CBC WITH AUTOMATED DIFF - Abnormal; Notable for the following:     HCT 32.9 (*)     MCV 79.1 (*)     All other components within normal limits   TSH 3RD GENERATION - Abnormal; Notable for the following:     TSH 5.70 (*)     All other components within normal limits   GLUCOSE, POC - Abnormal; Notable for the following:     Glucose (POC) 178 (*)     All other components within normal limits   GLUCOSE, POC - Abnormal; Notable for the following:     Glucose (POC) 101 (*)     All other components within normal limits   GLUCOSE, POC - Abnormal; Notable for the following:     Glucose (POC) 129 (*)     All other components within normal limits   MAGNESIUM   SODIUM, UR, RANDOM   OSMOLALITY, UR   SODIUM, UR, RANDOM   CORTISOL, AM   CK W/ CKMB & INDEX   GLUCOSE, POC     Admission on 02/21/2018   Component Date Value Ref Range Status    WBC 02/21/2018 9.4  3.6 - 11.0 K/uL Final    RBC 02/21/2018 4.78  3.80 - 5.20 M/uL Final    HGB 02/21/2018 13.1  11.5 - 16.0 g/dL Final    HCT 02/21/2018 38.1  35.0 - 47.0 % Final    MCV 02/21/2018 79.7* 80.0 - 99.0 FL Final    MCH 02/21/2018 27.4  26.0 - 34.0 PG Final    MCHC 02/21/2018 34.4  30.0 - 36.5 g/dL Final    RDW 02/21/2018 11.7  11.5 - 14.5 % Final    PLATELET 05/38/5998 151  150 - 400 K/uL Final    MPV 02/21/2018 9.5  8.9 - 12.9 FL Final    NRBC 02/21/2018 0.2* 0  WBC Final    ABSOLUTE NRBC 02/21/2018 0.02* 0.00 - 0.01 K/uL Final    NEUTROPHILS 02/21/2018 61  32 - 75 % Final    LYMPHOCYTES 02/21/2018 30  12 - 49 % Final    MONOCYTES 02/21/2018 6  5 - 13 % Final    EOSINOPHILS 02/21/2018 1  0 - 7 % Final    BASOPHILS 02/21/2018 1  0 - 1 % Final    IMMATURE GRANULOCYTES 02/21/2018 0  0.0 - 0.5 % Final    ABS.  NEUTROPHILS 02/21/2018 5.7  1.8 - 8.0 K/UL Final  ABS. LYMPHOCYTES 02/21/2018 2.8  0.8 - 3.5 K/UL Final    ABS. MONOCYTES 02/21/2018 0.6  0.0 - 1.0 K/UL Final    ABS. EOSINOPHILS 02/21/2018 0.1  0.0 - 0.4 K/UL Final    ABS. BASOPHILS 02/21/2018 0.1  0.0 - 0.1 K/UL Final    ABS. IMM. GRANS. 02/21/2018 0.0  0.00 - 0.04 K/UL Final    DF 02/21/2018 AUTOMATED    Final    Sodium 02/21/2018 120* 136 - 145 mmol/L Final    Potassium 02/21/2018 4.0  3.5 - 5.1 mmol/L Final    Chloride 02/21/2018 84* 97 - 108 mmol/L Final    CO2 02/21/2018 26  21 - 32 mmol/L Final    Anion gap 02/21/2018 10  5 - 15 mmol/L Final    Glucose 02/21/2018 109* 65 - 100 mg/dL Final    BUN 02/21/2018 5* 6 - 20 MG/DL Final    Creatinine 02/21/2018 0.62  0.55 - 1.02 MG/DL Final    BUN/Creatinine ratio 02/21/2018 8* 12 - 20   Final    GFR est AA 02/21/2018 >60  >60 ml/min/1.73m2 Final    GFR est non-AA 02/21/2018 >60  >60 ml/min/1.73m2 Final    Calcium 02/21/2018 8.8  8.5 - 10.1 MG/DL Final    Bilirubin, total 02/21/2018 0.4  0.2 - 1.0 MG/DL Final    ALT (SGPT) 02/21/2018 25  12 - 78 U/L Final    AST (SGOT) 02/21/2018 15  15 - 37 U/L Final    Alk.  phosphatase 02/21/2018 98  45 - 117 U/L Final    Protein, total 02/21/2018 8.2  6.4 - 8.2 g/dL Final    Albumin 02/21/2018 4.2  3.5 - 5.0 g/dL Final    Globulin 02/21/2018 4.0  2.0 - 4.0 g/dL Final    A-G Ratio 02/21/2018 1.1  1.1 - 2.2   Final    Magnesium 02/21/2018 2.0  1.6 - 2.4 mg/dL Final    Sodium urine, random 02/21/2018 46  MMOL/L Final    Glucose (POC) 02/21/2018 178* 65 - 100 mg/dL Final    Performed by 02/21/2018 Torsten Ocnonor   Final    Cortisol, a.m. 02/22/2018 7.6  4.3 - 22.4 ug/dL Final    Sodium 02/22/2018 126* 136 - 145 mmol/L Final    Potassium 02/22/2018 3.9  3.5 - 5.1 mmol/L Final    Chloride 02/22/2018 92* 97 - 108 mmol/L Final    CO2 02/22/2018 25  21 - 32 mmol/L Final    Anion gap 02/22/2018 9  5 - 15 mmol/L Final    Glucose 02/22/2018 100  65 - 100 mg/dL Final    BUN 02/22/2018 6  6 - 20 MG/DL Final    Creatinine 02/22/2018 0.47* 0.55 - 1.02 MG/DL Final    BUN/Creatinine ratio 02/22/2018 13  12 - 20   Final    GFR est AA 02/22/2018 >60  >60 ml/min/1.73m2 Final    GFR est non-AA 02/22/2018 >60  >60 ml/min/1.73m2 Final    Calcium 02/22/2018 8.0* 8.5 - 10.1 MG/DL Final    Bilirubin, total 02/22/2018 0.3  0.2 - 1.0 MG/DL Final    ALT (SGPT) 02/22/2018 23  12 - 78 U/L Final    AST (SGOT) 02/22/2018 12* 15 - 37 U/L Final    Alk. phosphatase 02/22/2018 79  45 - 117 U/L Final    Protein, total 02/22/2018 6.7  6.4 - 8.2 g/dL Final    Albumin 02/22/2018 3.3* 3.5 - 5.0 g/dL Final    Globulin 02/22/2018 3.4  2.0 - 4.0 g/dL Final    A-G Ratio 02/22/2018 1.0* 1.1 - 2.2   Final    WBC 02/22/2018 7.3  3.6 - 11.0 K/uL Final    RBC 02/22/2018 4.16  3.80 - 5.20 M/uL Final    HGB 02/22/2018 11.5  11.5 - 16.0 g/dL Final    HCT 02/22/2018 32.9* 35.0 - 47.0 % Final    MCV 02/22/2018 79.1* 80.0 - 99.0 FL Final    MCH 02/22/2018 27.6  26.0 - 34.0 PG Final    MCHC 02/22/2018 35.0  30.0 - 36.5 g/dL Final    RDW 02/22/2018 11.9  11.5 - 14.5 % Final    PLATELET 60/04/0481 942  150 - 400 K/uL Final    MPV 02/22/2018 9.7  8.9 - 12.9 FL Final    NRBC 02/22/2018 0.0  0  WBC Final    ABSOLUTE NRBC 02/22/2018 0.00  0.00 - 0.01 K/uL Final    NEUTROPHILS 02/22/2018 48  32 - 75 % Final    LYMPHOCYTES 02/22/2018 42  12 - 49 % Final    MONOCYTES 02/22/2018 8  5 - 13 % Final    EOSINOPHILS 02/22/2018 2  0 - 7 % Final    BASOPHILS 02/22/2018 1  0 - 1 % Final    IMMATURE GRANULOCYTES 02/22/2018 0  0.0 - 0.5 % Final    ABS. NEUTROPHILS 02/22/2018 3.5  1.8 - 8.0 K/UL Final    ABS. LYMPHOCYTES 02/22/2018 3.1  0.8 - 3.5 K/UL Final    ABS. MONOCYTES 02/22/2018 0.6  0.0 - 1.0 K/UL Final    ABS. EOSINOPHILS 02/22/2018 0.1  0.0 - 0.4 K/UL Final    ABS. BASOPHILS 02/22/2018 0.0  0.0 - 0.1 K/UL Final    ABS. IMM.  GRANS. 02/22/2018 0.0  0.00 - 0.04 K/UL Final    DF 02/22/2018 AUTOMATED    Final    CK 02/22/2018 47  26 - 192 U/L Final    CK - MB 02/22/2018 <1.0  <3.6 NG/ML Final    CK-MB Index 02/22/2018 Cannot be calculated  0 - 2.5   Final    TSH 02/22/2018 5.70* 0.36 - 3.74 uIU/mL Final    Glucose (POC) 02/22/2018 101* 65 - 100 mg/dL Final    Performed by 02/22/2018 Brittanie Gey   Final    Glucose (POC) 02/22/2018 129* 65 - 100 mg/dL Final    Performed by 02/22/2018 Brittanie Gey   Final    Glucose (POC) 02/22/2018 100  65 - 100 mg/dL Final    Performed by 02/22/2018 Bernadette Robert \"Samantha\"   Final   Hospital Outpatient Visit on 02/21/2018   Component Date Value Ref Range Status    Crossmatch Expiration 02/21/2018 03/07/2018   Final    ABO/Rh(D) 02/21/2018 A POSITIVE   Final    Antibody screen 02/21/2018 NEG   Final    WBC 02/21/2018 7.2  3.6 - 11.0 K/uL Final    RBC 02/21/2018 4.52  3.80 - 5.20 M/uL Final    HGB 02/21/2018 12.7  11.5 - 16.0 g/dL Final    HCT 02/21/2018 35.3  35.0 - 47.0 % Final    MCV 02/21/2018 78.1* 80.0 - 99.0 FL Final    MCH 02/21/2018 28.1  26.0 - 34.0 PG Final    MCHC 02/21/2018 36.0  30.0 - 36.5 g/dL Final    RDW 02/21/2018 11.9  11.5 - 14.5 % Final    PLATELET 83/34/8102 081  150 - 400 K/uL Final    MPV 02/21/2018 10.9  8.9 - 12.9 FL Final    NRBC 02/21/2018 0.0  0  WBC Final    ABSOLUTE NRBC 02/21/2018 0.00  0.00 - 0.01 K/uL Final    Special Requests: 02/21/2018 NO SPECIAL REQUESTS    Final    Culture result: 02/21/2018 MRSA NOT PRESENT    Final    Culture result: 02/21/2018     Screening of patient nares for MRSA is for surveillance purposes and, if positive, to facilitate isolation considerations in high risk settings. It is not intended for automatic decolonization interventions per se as regimens are not sufficiently effective to warrant routine use.     Final    Ventricular Rate 02/21/2018 66  BPM Final    Atrial Rate 02/21/2018 66  BPM Final    P-R Interval 02/21/2018 176  ms Final    QRS Duration 02/21/2018 86  ms Final    Q-T Interval 02/21/2018 424 ms Final    QTC Calculation (Bezet) 02/21/2018 444  ms Final    Calculated P Axis 02/21/2018 28  degrees Final    Calculated R Axis 02/21/2018 10  degrees Final    Calculated T Axis 02/21/2018 16  degrees Final    Diagnosis 02/21/2018    Final                    Value:Normal sinus rhythm  No previous ECGs available  Confirmed by Jose Antonio Perry ([id number]) on 2/21/2018 6:10:44 PM      Hemoglobin A1c 02/21/2018 6.5* 4.2 - 6.3 % Final    Est. average glucose 02/21/2018 140  mg/dL Final    INR 02/21/2018 1.0  0.9 - 1.1   Final    Prothrombin time 02/21/2018 10.4  9.0 - 11.1 sec Final    Color 02/21/2018 YELLOW/STRAW    Final    Appearance 02/21/2018 CLEAR  CLEAR   Final    Specific gravity 02/21/2018 1.010  1.003 - 1.030   Final    pH (UA) 02/21/2018 7.0  5.0 - 8.0   Final    Protein 02/21/2018 NEGATIVE   NEG mg/dL Final    Glucose 02/21/2018 NEGATIVE   NEG mg/dL Final    Ketone 02/21/2018 NEGATIVE   NEG mg/dL Final    Bilirubin 02/21/2018 NEGATIVE   NEG   Final    Blood 02/21/2018 NEGATIVE   NEG   Final    Urobilinogen 02/21/2018 0.2  0.2 - 1.0 EU/dL Final    Nitrites 02/21/2018 NEGATIVE   NEG   Final    Leukocyte Esterase 02/21/2018 NEGATIVE   NEG   Final    WBC 02/21/2018 0-4  0 - 4 /hpf Final    RBC 02/21/2018 0-5  0 - 5 /hpf Final    Epithelial cells 02/21/2018 FEW  FEW /lpf Final    Bacteria 02/21/2018 NEGATIVE   NEG /hpf Final    UA:UC IF INDICATED 02/21/2018 CULTURE NOT INDICATED BY UA RESULT  CNI   Final    Hyaline cast 02/21/2018 0-2  0 - 5 /lpf Final    Sodium 02/21/2018 118* 136 - 145 mmol/L Final    Potassium 02/21/2018 4.1  3.5 - 5.1 mmol/L Final    Chloride 02/21/2018 85* 97 - 108 mmol/L Final    CO2 02/21/2018 26  21 - 32 mmol/L Final    Anion gap 02/21/2018 7  5 - 15 mmol/L Final    Glucose 02/21/2018 104* 65 - 100 mg/dL Final    BUN 02/21/2018 6  6 - 20 MG/DL Final    Creatinine 02/21/2018 0.54* 0.55 - 1.02 MG/DL Final    BUN/Creatinine ratio 02/21/2018 11* 12 - 20 Final    GFR est AA 02/21/2018 >60  >60 ml/min/1.73m2 Final    GFR est non-AA 02/21/2018 >60  >60 ml/min/1.73m2 Final    Calcium 02/21/2018 8.3* 8.5 - 10.1 MG/DL Final    Bilirubin, total 02/21/2018 0.3  0.2 - 1.0 MG/DL Final    ALT (SGPT) 02/21/2018 24  12 - 78 U/L Final    AST (SGOT) 02/21/2018 13* 15 - 37 U/L Final    Alk. phosphatase 02/21/2018 92  45 - 117 U/L Final    Protein, total 02/21/2018 7.8  6.4 - 8.2 g/dL Final    Albumin 02/21/2018 3.9  3.5 - 5.0 g/dL Final    Globulin 02/21/2018 3.9  2.0 - 4.0 g/dL Final    A-G Ratio 02/21/2018 1.0* 1.1 - 2.2   Final    Prealbumin 02/21/2018 34.3  20.0 - 40.0 mg/dL Final    Vitamin D 25-Hydroxy 02/21/2018 109.6* 30 - 100 ng/mL Final        RADIOLOGY REPORTS:    Results from Hospital Encounter encounter on 02/21/18   XR CHEST PA LAT   Narrative EXAM:  XR CHEST PA LAT    INDICATION:   History of hypertension and shortness of breath on exertion. COMPARISON: June 8, 2009. FINDINGS: PA and lateral radiographs of the chest demonstrate clear lungs. The  cardiac and mediastinal contours and pulmonary vascularity are normal.  The  bones and soft tissues are within normal limits. Impression IMPRESSION: Normal chest x-ray. Xr Chest Pa Lat    Result Date: 2/21/2018  EXAM:  XR CHEST PA LAT INDICATION:   History of hypertension and shortness of breath on exertion. COMPARISON: June 8, 2009. FINDINGS: PA and lateral radiographs of the chest demonstrate clear lungs. The cardiac and mediastinal contours and pulmonary vascularity are normal.  The bones and soft tissues are within normal limits. IMPRESSION: Normal chest x-ray.               MEDICATIONS       ALL MEDICATIONS  Current Facility-Administered Medications   Medication Dose Route Frequency    lurasidone (LATUDA) tablet 80 mg  80 mg Oral QHS    traZODone (DESYREL) tablet 50 mg  50 mg Oral QHS PRN    OXcarbazepine (TRILEPTAL) tablet 150 mg  150 mg Oral BID    0.9% sodium chloride infusion  75 mL/hr IntraVENous CONTINUOUS    sodium chloride (NS) flush 5-10 mL  5-10 mL IntraVENous Q8H    sodium chloride (NS) flush 5-10 mL  5-10 mL IntraVENous PRN    acetaminophen (TYLENOL) tablet 650 mg  650 mg Oral Q6H PRN    oxyCODONE-acetaminophen (PERCOCET) 5-325 mg per tablet 1 Tab  1 Tab Oral Q6H PRN    naloxone (NARCAN) injection 0.4 mg  0.4 mg IntraVENous PRN    ondansetron (ZOFRAN) injection 4 mg  4 mg IntraVENous Q4H PRN    bisacodyl (DULCOLAX) suppository 10 mg  10 mg Rectal DAILY PRN    enoxaparin (LOVENOX) injection 40 mg  40 mg SubCUTAneous Q12H    clonazePAM (KlonoPIN) tablet 1.5 mg  1.5 mg Oral BID    clonazePAM (KlonoPIN) tablet 0.5 mg  0.5 mg Oral DAILY WITH LUNCH    meloxicam (MOBIC) tablet 15 mg  15 mg Oral DAILY    metFORMIN (GLUCOPHAGE) tablet 1,000 mg  1,000 mg Oral BID WITH MEALS    atorvastatin (LIPITOR) tablet 40 mg  40 mg Oral DAILY    baclofen (LIORESAL) tablet 10 mg  10 mg Oral QPM    gabapentin (NEURONTIN) capsule 300 mg  300 mg Oral BID    lisinopril (PRINIVIL, ZESTRIL) tablet 20 mg  20 mg Oral DAILY    insulin lispro (HUMALOG) injection   SubCUTAneous AC&HS    glucose chewable tablet 16 g  4 Tab Oral PRN    dextrose (D50W) injection syrg 12.5-25 g  12.5-25 g IntraVENous PRN    glucagon (GLUCAGEN) injection 1 mg  1 mg IntraMUSCular PRN      SCHEDULED MEDICATIONS  Current Facility-Administered Medications   Medication Dose Route Frequency    lurasidone (LATUDA) tablet 80 mg  80 mg Oral QHS    OXcarbazepine (TRILEPTAL) tablet 150 mg  150 mg Oral BID    0.9% sodium chloride infusion  75 mL/hr IntraVENous CONTINUOUS    sodium chloride (NS) flush 5-10 mL  5-10 mL IntraVENous Q8H    enoxaparin (LOVENOX) injection 40 mg  40 mg SubCUTAneous Q12H    clonazePAM (KlonoPIN) tablet 1.5 mg  1.5 mg Oral BID    clonazePAM (KlonoPIN) tablet 0.5 mg  0.5 mg Oral DAILY WITH LUNCH    meloxicam (MOBIC) tablet 15 mg  15 mg Oral DAILY    metFORMIN (GLUCOPHAGE) tablet 1,000 mg  1,000 mg Oral BID WITH MEALS    atorvastatin (LIPITOR) tablet 40 mg  40 mg Oral DAILY    baclofen (LIORESAL) tablet 10 mg  10 mg Oral QPM    gabapentin (NEURONTIN) capsule 300 mg  300 mg Oral BID    lisinopril (PRINIVIL, ZESTRIL) tablet 20 mg  20 mg Oral DAILY    insulin lispro (HUMALOG) injection   SubCUTAneous AC&HS                ASSESSMENT & PLAN        The patient Ck Zaragoza is a 41 y.o.  female who presents at this time for treatment of the following diagnoses:  Patient Active Hospital Problem List:   Hyponatremia (2/21/2018)- moderate and acute    Assessment: multifactorial- medication ( ?trileptal), SIADH/ nephrogenic etc    Plan: check cause of low sodium and treat per protocol, fluid restriction- pt has reported drinking 6 bottles of water daily at home. Consider checking osmolality- serum/ urine. Bipolar II disorder  Assessment: stable, rule out DID/PTSD by hx  Plan:will change Latuda to 80 mg daily with dinner, ct with Klonopin and re challenge Trileptal 150 mg bid x 1 day and then titrate to 300 mg bid. If pt is doing well then ct maintenance at same dose with out patient follow up with Psychiatrist/NP- Katie Nair at [facility]. Advise to restrict fluid and check BMP. Disposition:out patient follow up  Thank you very much for the opportunity to participate in the care of your patient. I will continue to follow up with patient as deemed appropriate. I have reviewed admission (and previous/old) labs and medical tests in the EHR and or transferring hospital documents. I will continue to order blood tests/labs and diagnostic tests as deemed appropriate and review results as they become available (see orders for details). I have reviewed old psychiatric and medical records available in the EHR. I Will order additional psychiatric records from other institutions to further elucidate the nature of patient's psychopathology and review once available.     I will gather additional collateral information from friends, family and o/p treatment team to further elucidate the nature of patient's psychopathology and baselline level of psychiatric functioning.                      SIGNED:    Monserrat Meza MD  2/22/2018

## 2018-02-22 NOTE — PROGRESS NOTES
Hospitalist Progress Note    NAME: Kristen Castillo   :  1970   MRN:  [id number]     Assessment / Plan:  Hyponatremia POA Na 118-120  -sodium trending up now to 126  -mentation improved to what we assume to be her baseline  -trend sodium in AM. Doubt this was psychogenic as she did not correct to normal with restriction of fluid she is on. Strongly suspect this si medication induced  -do not feel that restart of trileptal is a good idea but per psych they wish to trial it - they will need ot have aggressive OP lab follow up or transfer to IP psych facility where med psych can follow     DM type 2 POA  -pending a1c  -ssi for now with poc bs  -not following lantus as she is to do at home therefore we should stop this medicine as it can become a problem should she take it and not monitor carefully  -remains on metformin  -remain on humalog as meant to be on op     Essential HTN POA  -remain on acei, prn hydralazine     DDD lumbar spine POA  -for OP OR 3/8/18  -continue neurontin only     Severe bipolar depression POA  PTSD POA  -per psychiatry - patient will likely be ready for dc in AM medically     Hyperlipidemia POA   -remain on statin      Morbid obesity POA Body mass index is 48.87 kg/(m^2).     DVT prophylaxis with lovenox     Code status: full code  NOK:      Medical Decision Making Today  · Acute or chronic illness that poses a threat to life or bodily function  · I have reviewed the flowsheet and previous days notes  · One or more chronic illnesses with severe exacerbation, progression or side effects of treatment  · Review and order of Clinical lab tests  · Discuss case with Specialist MD    Subjective:     Chief Complaint / Reason for Physician Visit  \"i feel ok\". Discussed with RN events overnight.  No complaints    Review of Systems:  Symptom Y/N Comments  Symptom Y/N Comments   Fever/Chills n   Chest Pain n    Poor Appetite n   Edema     Cough    Abdominal Pain n    Sputum    Joint Pain SOB/SAUCEDO n   Pruritis/Rash     Nausea/vomit n   Tolerating PT/OT     Diarrhea n   Tolerating Diet y    Constipation    Other       Could NOT obtain due to:      Objective:     VITALS:   Last 24hrs VS reviewed since prior progress note. Most recent are:  Patient Vitals for the past 24 hrs:   Temp Pulse Resp BP SpO2   02/22/18 0817 - 68 - 134/81 -   02/22/18 0549 97.2 °F (36.2 °C) 68 16 106/52 99 %   02/22/18 0230 97.4 °F (36.3 °C) 69 16 115/50 97 %   02/21/18 2351 98 °F (36.7 °C) 75 15 140/71 97 %   02/21/18 2245 - 77 15 132/68 94 %   02/21/18 2230 - 81 15 128/70 95 %   02/21/18 2215 - 77 17 110/57 94 %   02/21/18 2145 - 77 16 122/59 95 %   02/21/18 2128 - 68 17 - 95 %   02/21/18 2115 - - - 132/60 -   02/21/18 2100 - - - 130/60 96 %   02/21/18 2045 - - - 129/66 97 %   02/21/18 1930 - - - 128/85 97 %   02/21/18 1918 - - - - 96 %   02/21/18 1915 - - - 128/79 96 %   02/21/18 1900 - - - 138/78 98 %   02/21/18 1857 - - - - 98 %   02/21/18 1845 - - - 141/83 97 %   02/21/18 1842 - - - 140/77 98 %   02/21/18 1826 - - - - 96 %   02/21/18 1520 98.1 °F (36.7 °C) 74 16 162/84 99 %       Intake/Output Summary (Last 24 hours) at 02/22/18 0853  Last data filed at 02/22/18 0553   Gross per 24 hour   Intake              200 ml   Output                0 ml   Net              200 ml        PHYSICAL EXAM:  General: WD, obese f sitting up in bed, interactive, Alert, cooperative, no acute distress    EENT:  EOMI. Anicteric sclerae. MM dry  Resp:  CTA bilaterally, no wheezing or rales. No accessory muscle use  CV:  Regular  rhythm,  No edema  GI:  Soft, Non distended, Non tender.  +Bowel sounds  Neurologic:  Alert and oriented X 3, normal speech  Psych:   Good insight. Not anxious nor agitated  Skin:  no rashes or ulcers.   No jaundice    Reviewed most current lab test results and cultures  YES  Reviewed most current radiology test results   YES  Review and summation of old records today    NO  Reviewed patient's current orders and [facility] YES  PMH/SH reviewed - no change compared to H&P  ________________________________________________________________________  Care Plan discussed with:    Comments   Patient x Discussed with patient in room. POC discussed. Questions answered (20   Family      RN x 5   Care Manager     Consultant:                       Multidiciplinary team rounds were held today with , nursing, pharmacist and clinical coordinator. Patient's plan of care was discussed; medications were reviewed and discharge planning was addressed. ________________________________________________________________________  Total NON critical care TIME:  35   Minutes    Total CRITICAL CARE TIME Spent:   Minutes non procedure based. I have provided critical care time. During this entire length of time I was immediately available to the patient. The reason for providing this level of medical care was due to a critical illness that impaired one or more vital organ systems, such that there was a high probability of imminent or life threatening deterioration in the patient's condition. This care involved high complexity decision making which includes reviewing the patient's past medical records, current laboratory results, and actual Xray films in order to assess, support vital system function, and to treat this degree of vital organ system failure, and to prevent further life threatening deterioration of the patients condition. Comments   >50% of visit spent in counseling and coordination of care x See above   ________________________________________________________________________  Procedures: see electronic medical records for all procedures/Xrays and details which were not copied into this note but were reviewed prior to creation of Plan.       LABS:  Recent Labs      02/22/18   0609  02/21/18   1649  02/21/18   1315   WBC  7.3  9.4  7.2   HGB  11.5  13.1  12.7   HCT  32.9*  38.1  35.3   PLT  282  317  211     Recent Labs 02/22/18   0609  02/21/18   1649  02/21/18   1315   NA  126*  120*  118*   K  3.9  4.0  4.1   CL  92*  84*  85*   CO2  25  26  26   BUN  6  5*  6   CREA  0.47*  0.62  0.54*   GLU  100  109*  104*   CA  8.0*  8.8  8.3*   MG   --   2.0   --      Recent Labs      02/22/18   0609  02/21/18   1649  02/21/18   1315   SGOT  12*  15  13*   ALT  23  25  24   AP  79  98  92   TBILI  0.3  0.4  0.3   TP  6.7  8.2  7.8   ALB  3.3*  4.2  3.9   GLOB  3.4  4.0  3.9     Recent Labs      02/21/18   1315   INR  1.0   PTP  10.4      No results for input(s): FE, TIBC, PSAT, FERR in the last 72 hours. No results found for: FOL, RBCF   No results for input(s): PH, PCO2, PO2 in the last 72 hours. No results for input(s): PHI, PO2I, PCO2I in the last 72 hours.   Recent Labs      02/22/18   0609   CPK  47   CKNDX  Cannot be calculated     No results found for: CHOL, CHOLX, CHLST, CHOLV, HDL, LDL, LDLC, DLDLP, TGLX, TRIGL, TRIGP, CHHD, CHHDX  Lab Results   Component Value Date/Time    Glucose (POC) 101 (H) 02/22/2018 07:56 AM    Glucose (POC) 178 (H) 02/21/2018 10:16 PM    Glucose (POC) 139 (H) 09/29/2011 12:01 PM    Glucose (POC) 171 (H) 09/29/2011 07:33 AM    Glucose (POC) 227 (H) 09/28/2011 08:47 PM     Lab Results   Component Value Date/Time    Color YELLOW/STRAW 02/21/2018 01:16 PM    Appearance CLEAR 02/21/2018 01:16 PM    Specific gravity 1.010 02/21/2018 01:16 PM    pH (UA) 7.0 02/21/2018 01:16 PM    Protein NEGATIVE  02/21/2018 01:16 PM    Glucose NEGATIVE  02/21/2018 01:16 PM    Ketone NEGATIVE  02/21/2018 01:16 PM    Bilirubin NEGATIVE  02/21/2018 01:16 PM    Urobilinogen 0.2 02/21/2018 01:16 PM    Nitrites NEGATIVE  02/21/2018 01:16 PM    Leukocyte Esterase NEGATIVE  02/21/2018 01:16 PM    Epithelial cells FEW 02/21/2018 01:16 PM    Bacteria NEGATIVE  02/21/2018 01:16 PM    WBC 0-4 02/21/2018 01:16 PM    RBC 0-5 02/21/2018 01:16 PM       RADIOGRAPHIC STUDIES:  CXR Results  (Last 48 hours)               02/21/18 1459  XR CHEST PA LAT Final result    Impression:  IMPRESSION: Normal chest x-ray. Narrative:  EXAM:  XR CHEST PA LAT       INDICATION:   History of hypertension and shortness of breath on exertion. COMPARISON: June 8, 2009. FINDINGS: PA and lateral radiographs of the chest demonstrate clear lungs. The   cardiac and mediastinal contours and pulmonary vascularity are normal.  The   bones and soft tissues are within normal limits. CT Results  (Last 48 hours)    None          Echo Results  (Last 48 hours)    None          VENOUS DOPPLER results  (Last 48 hours)    None          CULTURES:    Lab Results   Component Value Date/Time    Specimen Description: URINE 04/29/2009 02:19 PM    Lab Results   Component Value Date/Time    Culture result: MRSA NOT PRESENT  AT 16 HOURS 02/21/2018 01:16 PM    Culture result: MIXED SKIN LANE ISOLATED 04/29/2009 02:19 PM          Signed: Odalis Denis MD    This note will not be viewable in [address]

## 2018-02-22 NOTE — PROGRESS NOTES
Pharmacy Clarification of Prior to Admission Medication Regimen     The patient was interviewed regarding clarification of the prior to admission medication regimen and was questioned regarding use of any other inhalers, topical products, over the counter medications, herbal medications, vitamin products or ophthalmic/nasal/otic medication use. Information Obtained From: Patient and Rx Query    Pertinent Pharmacy Findings: None    PTA medication list was corrected to the following:     Prior to Admission Medications   Prescriptions Last Dose Informant Patient Reported? Taking? OXcarbaxepine (TRILEPTAL) 600 mg tablet 2/21/2018 at Unknown time Self Yes Yes   Sig: Take 600 mg by mouth three (3) times daily. acetaminophen (TYLENOL EXTRA STRENGTH) 500 mg tablet 2/20/2018 at Unknown time Self Yes Yes   Sig: Take 500 mg by mouth every six (6) hours as needed for Pain. atorvastatin (LIPITOR) 40 mg tablet 2/21/2018 at Unknown time Self Yes Yes   Sig: Take 40 mg by mouth daily. baclofen (LIORESAL) 10 mg tablet 2/21/2018 at Unknown time Self Yes Yes   Sig: Take 10 mg by mouth every evening. clonazePAM (KLONOPIN) 1 mg tablet 2/21/2018 at Unknown time Self Yes Yes   Sig: Take 1 mg by mouth two (2) times a day. Administration Instructions:  Patient takes 1 mg in the morning and evening and 0.5 mg in the afternoon. clonazePAM (KLONOPIN) 1 mg tablet 2/20/2018 at Unknown time Self Yes Yes   Sig: Take 0.5 mg by mouth daily (with lunch). Administration Instructions:  Patient takes 1 mg in the morning and evening and 0.5 mg in the afternoon. ergocalciferol (VITAMIN D2) 50,000 unit capsule 2/19/2018 at Unknown time  Yes Yes   Sig: Take 50,000 Units by mouth two (2) times a week. on Monday and Thursday   gabapentin (NEURONTIN) 300 mg capsule 2/21/2018 at Unknown time Self Yes Yes   Sig: Take 300 mg by mouth two (2) times a day.    insulin lispro (HUMALOG) 100 unit/mL kwikpen 2/21/2018 at Unknown time Self Yes Yes   Sig: 8 Units by SubCUTAneous route Before breakfast and dinner. lisinopril (PRINIVIL, ZESTRIL) 20 mg tablet 2018 at Unknown time Self Yes Yes   Sig: Take 20 mg by mouth daily. lurasidone (LATUDA) 80 mg tab tablet 2018 at Unknown time Self Yes Yes   Sig: Take 80 mg by mouth daily. meloxicam (MOBIC) 15 mg tablet 2018 at Unknown time Self Yes Yes   Sig: Take 15 mg by mouth daily. metFORMIN (GLUCOPHAGE) 1,000 mg tablet 2018 at Unknown time Self Yes Yes   Sig: Take 1,000 mg by mouth two (2) times daily (with meals). multivitamin (ONE A DAY) tablet 2018 at Unknown time Self Yes Yes   Sig: Take 1 Tab by mouth daily. naloxone (NARCAN) 1 mg/mL injection Not Taking at Unknown time Self No No   Si mL by IntraMUSCular route once as needed for up to 1 dose.       Facility-Administered Medications: None       Thank you,  Nathan Nelson CPhT  Medication History Pharmacy Technician

## 2018-02-22 NOTE — PROGRESS NOTES
Bedside shift change report given to Kacy Conn 46 (oncoming nurse) by Silvana Love RN (offgoing nurse). Report included the following information SBAR, Kardex, ED Summary, Intake/Output, MAR and Recent Results.

## 2018-02-23 VITALS
HEART RATE: 94 BPM | RESPIRATION RATE: 16 BRPM | TEMPERATURE: 98 F | HEIGHT: 62 IN | DIASTOLIC BLOOD PRESSURE: 80 MMHG | BODY MASS INDEX: 49.17 KG/M2 | OXYGEN SATURATION: 98 % | SYSTOLIC BLOOD PRESSURE: 130 MMHG | WEIGHT: 267.2 LBS

## 2018-02-23 LAB
ANION GAP SERPL CALC-SCNC: 7 MMOL/L (ref 5–15)
BUN SERPL-MCNC: 7 MG/DL (ref 6–20)
BUN/CREAT SERPL: 11 (ref 12–20)
CALCIUM SERPL-MCNC: 8.3 MG/DL (ref 8.5–10.1)
CHLORIDE SERPL-SCNC: 98 MMOL/L (ref 97–108)
CO2 SERPL-SCNC: 27 MMOL/L (ref 21–32)
CREAT SERPL-MCNC: 0.66 MG/DL (ref 0.55–1.02)
GLUCOSE BLD STRIP.AUTO-MCNC: 110 MG/DL (ref 65–100)
GLUCOSE BLD STRIP.AUTO-MCNC: 119 MG/DL (ref 65–100)
GLUCOSE SERPL-MCNC: 177 MG/DL (ref 65–100)
POTASSIUM SERPL-SCNC: 4.2 MMOL/L (ref 3.5–5.1)
SERVICE CMNT-IMP: ABNORMAL
SERVICE CMNT-IMP: ABNORMAL
SODIUM SERPL-SCNC: 132 MMOL/L (ref 136–145)

## 2018-02-23 PROCEDURE — 36415 COLL VENOUS BLD VENIPUNCTURE: CPT | Performed by: INTERNAL MEDICINE

## 2018-02-23 PROCEDURE — 80048 BASIC METABOLIC PNL TOTAL CA: CPT | Performed by: INTERNAL MEDICINE

## 2018-02-23 PROCEDURE — 82962 GLUCOSE BLOOD TEST: CPT

## 2018-02-23 PROCEDURE — 74011250637 HC RX REV CODE- 250/637: Performed by: INTERNAL MEDICINE

## 2018-02-23 PROCEDURE — 74011250637 HC RX REV CODE- 250/637: Performed by: PSYCHIATRY & NEUROLOGY

## 2018-02-23 PROCEDURE — 74011250636 HC RX REV CODE- 250/636: Performed by: INTERNAL MEDICINE

## 2018-02-23 RX ORDER — OXCARBAZEPINE 600 MG/1
300 TABLET, FILM COATED ORAL 2 TIMES DAILY
Qty: 1 TAB | Refills: 0 | Status: SHIPPED
Start: 2018-02-23 | End: 2021-01-11

## 2018-02-23 RX ADMIN — LISINOPRIL 20 MG: 20 TABLET ORAL at 09:31

## 2018-02-23 RX ADMIN — METFORMIN HYDROCHLORIDE 1000 MG: 500 TABLET, FILM COATED ORAL at 09:32

## 2018-02-23 RX ADMIN — CLONAZEPAM 0.5 MG: 0.5 TABLET ORAL at 13:00

## 2018-02-23 RX ADMIN — CLONAZEPAM 1.5 MG: 1 TABLET ORAL at 09:31

## 2018-02-23 RX ADMIN — SODIUM CHLORIDE 75 ML/HR: 900 INJECTION, SOLUTION INTRAVENOUS at 13:01

## 2018-02-23 RX ADMIN — OXYCODONE HYDROCHLORIDE AND ACETAMINOPHEN 1 TABLET: 5; 325 TABLET ORAL at 12:59

## 2018-02-23 RX ADMIN — ATORVASTATIN CALCIUM 40 MG: 40 TABLET, FILM COATED ORAL at 09:32

## 2018-02-23 RX ADMIN — GABAPENTIN 300 MG: 300 CAPSULE ORAL at 09:31

## 2018-02-23 RX ADMIN — OXCARBAZEPINE 150 MG: 150 TABLET ORAL at 09:32

## 2018-02-23 RX ADMIN — ENOXAPARIN SODIUM 40 MG: 40 INJECTION SUBCUTANEOUS at 09:32

## 2018-02-23 NOTE — PROGRESS NOTES
Discharge instructions reviewed with the patient. The  at bedside. Both able to verbalize an understanding. The  with all personal belongings. The patient left via wheelchair.

## 2018-02-23 NOTE — DISCHARGE SUMMARY
Hospitalist Discharge Summary    NAME: Grace Raphael   :  1970   MRN:  [id number]     DISCHARGE DIAGNOSIS:  Hyponatremia POA, multifactorial in etiology  DM type 2 POA  Essential HTN POA  DDD lumbar spine POA  Severe bipolar depression POA  PTSD POA  Hyperlipidemia POA     CONSULTATIONS:  Psychiatry    Follow Up: Follow-up Information     Follow up With Details Comments Contact Info    Vijay Davalos MD Schedule an appointment as soon as possible for a visit in 3 weeks Call Sooner, As needed, If symptoms worsen [address]      Mac Tyson MD Go to surgery , as scheduled [address]. [phone]      your psychiatrist  as directed by your psychiatrist           Procedures: see electronic medical records for all procedures/Xrays and details which were not copied into this note but were reviewed prior to creation of Plan. Please follow-up tests/labs that are still pendin. None     PMH/SH reviewed - no change compared to H&P    DISCHARGE SUMMARY/HOSPITAL COURSE: for full details see H&P, daily progress notes, labs, consult notes.  Briefly As Per HPI:   A 19-year-old white female with known severe bipolar disorder, currently stabilized on Klonopin, Trileptal and [place].  She notes she had a history of hyponatremia approximately 15 years ago on the Trileptal and it was stopped for several years. Supacelestina Fernando subsequently resumed it because it was the most efficacious drug for her and she has been on it since that time. Colt King is not aware of any low sodium levels and says they have been checked periodically, the last sodium level we have in the computer was 136 from 2012.  She says for the last couple of months she has had some cold intolerance and some hand swelling over the past month, but otherwise has been feeling well.  She has not had any nausea, vomiting, chest pain, abdominal pain, cough, shortness of breath, diarrhea, melena or bright red blood per rectum, no unusual headaches, no changes in her mental status.  She has been having degenerative disk disease of the lumbar spine and is scheduled to have a laminectomy at L4, L5 in several weeks.  She came in today for preop testing and was called back when her sodium level came back at 118, she was referred to the emergency room.      In the emergency room, the patient was awake, alert, oriented x3, she was in no distress. Pradeep Bergman was a little bit tearful when we talked about stopping the Trileptal.  She denies any recent changes to her medication regimen.  We were called to admit the patient. The patient's hospital course was complicated by:  Hyponatremia POA, multifactorial in etiology  DM type 2 POA  Essential HTN POA  DDD lumbar spine POA  Severe bipolar depression POA  PTSD POA  Hyperlipidemia POA     Patient with complex inpatient course. Please see all notes, labs, vitals, testing and procedures for details, briefly the patient was admitted following presentation to ED with hyponatremia. Suspected multifactorial but trileptal dosing was quite elevated at 1800mg daily. Suspect that this plus her water intake which is moderate at best was causing the drop in sodium. The sodium improved with dc of trileptal and no fluid changes made. Now is near normal. Discussed with psych and will go back on trileptal but 300mg bid per their wishes. Ortho will discuss if she will need psych for post operative time in the next week.   _______________________________________________________________________   Patient seen and examined by me on day of discharge. Pertinent findings are:  Gen: obese f in nad  HEENT: nc at   Chest: cta b  Cv: nor /g  Abd: s/nd/nt +bs  Neuro: cn 2-12 gi, flat affect    See Discharge Instructions for further details.   _______________________________________________________________________    Medications Reviewed:  Current Discharge Medication List CONTINUE these medications which have CHANGED    Details   OXcarbaxepine (TRILEPTAL) 600 mg tablet Take 0.5 Tabs by mouth two (2) times a day. Qty: 1 Tab, Refills: 0         CONTINUE these medications which have NOT CHANGED    Details   atorvastatin (LIPITOR) 40 mg tablet Take 40 mg by mouth daily. baclofen (LIORESAL) 10 mg tablet Take 10 mg by mouth every evening.      gabapentin (NEURONTIN) 300 mg capsule Take 300 mg by mouth two (2) times a day. insulin lispro (HUMALOG) 100 unit/mL kwikpen 8 Units by SubCUTAneous route Before breakfast and dinner. acetaminophen (TYLENOL EXTRA STRENGTH) 500 mg tablet Take 500 mg by mouth every six (6) hours as needed for Pain. !! clonazePAM (KLONOPIN) 1 mg tablet Take 1 mg by mouth two (2) times a day. Administration Instructions:  Patient takes 1 mg in the morning and evening and 0.5 mg in the afternoon. !! clonazePAM (KLONOPIN) 1 mg tablet Take 0.5 mg by mouth daily (with lunch). Administration Instructions:  Patient takes 1 mg in the morning and evening and 0.5 mg in the afternoon. lurasidone (LATUDA) 80 mg tab tablet Take 80 mg by mouth daily. meloxicam (MOBIC) 15 mg tablet Take 15 mg by mouth daily. metFORMIN (GLUCOPHAGE) 1,000 mg tablet Take 1,000 mg by mouth two (2) times daily (with meals). ergocalciferol (VITAMIN D2) 50,000 unit capsule Take 50,000 Units by mouth two (2) times a week. on Monday and Thursday      multivitamin (ONE A DAY) tablet Take 1 Tab by mouth daily. lisinopril (PRINIVIL, ZESTRIL) 20 mg tablet Take 20 mg by mouth daily. naloxone (NARCAN) 1 mg/mL injection 2 mL by IntraMUSCular route once as needed for up to 1 dose. Qty: 1 Syringe, Refills: 0       !! - Potential duplicate medications found.  Please discuss with provider.        _______________________________________________________________________    Risk of deterioration: Moderate  ________________________________________________________________________    Disposition  Home with family, no needs  ________________________________________________________________________    Care Plan discussed with:   Patient, Family, RN, Care Manager, Consultant  Comment:   ________________________________________________________________________    Code Status: Full Code  ________________________________________________________________________    Total time spent in discharge (min): 35   ________________________________________________________________________    CDMP Checked: Yes    Signed: Revonda Jasson, MD    This note will not be viewable in [address] [place] [address] Ave.

## 2018-02-23 NOTE — DISCHARGE INSTRUCTIONS
DISCHARGE DIAGNOSIS:  Hyponatremia POA, multifactorial in etiology  DM type 2 POA  Essential HTN POA  DDD lumbar spine POA  Severe bipolar depression POA  PTSD POA  Hyperlipidemia POA     MEDICATIONS:  · It is important that you take the medication exactly as they are prescribed. · Keep your medication in the bottles provided by the pharmacist and keep a list of the medication names, dosages, and times to be taken in your wallet. · Do not take other medications without consulting your doctor. Pain Management: per above medications    What to do at [address]:  Resume previous diet with lower water intake    Recommended activity: Activity as tolerated    If you have questions regarding the hospital related prescriptions or hospital related issues please call [email or web address] at . You can always direct your questions to your primary care doctor if you are unable to reach your hospital physician; your PCP works as an extension of your hospital doctor just like your hospital doctor is an extension of your PCP for your time at the hospital [facility], [facility]). If you experience any of the following symptoms then please call your primary care physician or return to the emergency room if you cannot get hold of your doctor:  Fever, chills, nausea, vomiting, diarrhea, change in mentation, falling, bleeding, shortness of breath    NOTE THE CHANGE TO YOUR TRILEPTAL DOSING.  It is now 300mg twice daily

## 2018-02-23 NOTE — ROUTINE PROCESS
Bedside and Verbal shift change report given to Italo GREER (oncoming nurse) by Fariba Duenas (offgoing nurse). Report included the following information SBAR, Kardex, Intake/Output, MAR and Recent Results.

## 2018-02-23 NOTE — PROGRESS NOTES
Problem: Falls - Risk of  Goal: *Absence of Falls  Document Hira Fall Risk and appropriate interventions in the flowsheet.    Outcome: Resolved/Met Date Met: 02/23/18  Fall Risk Interventions:            Medication Interventions: Assess postural VS orthostatic hypotension, Teach patient to arise slowly, Evaluate medications/consider consulting pharmacy

## 2018-02-23 NOTE — PROGRESS NOTES
Problem: Falls - Risk of  Goal: *Absence of Falls  Document Hira Fall Risk and appropriate interventions in the flowsheet.    Outcome: Progressing Towards Goal  Fall Risk Interventions:            Medication Interventions: Assess postural VS orthostatic hypotension, Teach patient to arise slowly, Evaluate medications/consider consulting pharmacy

## 2018-02-28 NOTE — PERIOP NOTES
PC to Dr Gladis Bales office to confirm  Clearance has been obtained, VM left for [facility] to please call back at [phone] to confirm pt has been cleared for surgery.  Leatha Montes RN

## 2018-03-05 ENCOUNTER — HOSPITAL ENCOUNTER (INPATIENT)
Age: 48
LOS: 3 days | Discharge: HOME HEALTH | DRG: 454 | End: 2018-03-08
Attending: ORTHOPAEDIC SURGERY | Admitting: ORTHOPAEDIC SURGERY
Payer: MEDICARE

## 2018-03-05 ENCOUNTER — ANESTHESIA EVENT (OUTPATIENT)
Dept: SURGERY | Age: 48
DRG: 454 | End: 2018-03-05
Payer: MEDICARE

## 2018-03-05 ENCOUNTER — ANESTHESIA (OUTPATIENT)
Dept: SURGERY | Age: 48
DRG: 454 | End: 2018-03-05
Payer: MEDICARE

## 2018-03-05 ENCOUNTER — APPOINTMENT (OUTPATIENT)
Dept: GENERAL RADIOLOGY | Age: 48
DRG: 454 | End: 2018-03-05
Attending: ORTHOPAEDIC SURGERY
Payer: MEDICARE

## 2018-03-05 DIAGNOSIS — Z98.1 S/P LUMBAR SPINAL FUSION: Primary | ICD-10-CM

## 2018-03-05 PROBLEM — M43.10 SPONDYLISTHESIS: Status: ACTIVE | Noted: 2018-03-05

## 2018-03-05 LAB
ANION GAP BLD CALC-SCNC: 18 MMOL/L (ref 5–15)
BUN BLD-MCNC: 14 MG/DL (ref 9–20)
CA-I BLD-MCNC: 1.12 MMOL/L (ref 1.12–1.32)
CHLORIDE BLD-SCNC: 96 MMOL/L (ref 98–107)
CO2 BLD-SCNC: 26 MMOL/L (ref 21–32)
CREAT BLD-MCNC: 0.5 MG/DL (ref 0.6–1.3)
GLUCOSE BLD-MCNC: 130 MG/DL (ref 65–100)
HCG UR QL: NEGATIVE
HCT VFR BLD CALC: 35 % (ref 35–47)
HGB BLD-MCNC: 11.9 GM/DL (ref 11.5–16)
POTASSIUM BLD-SCNC: 4.2 MMOL/L (ref 3.5–5.1)
SERVICE CMNT-IMP: ABNORMAL
SODIUM BLD-SCNC: 135 MMOL/L (ref 136–145)

## 2018-03-05 PROCEDURE — 07DR3ZZ EXTRACTION OF ILIAC BONE MARROW, PERCUTANEOUS APPROACH: ICD-10-PCS | Performed by: ORTHOPAEDIC SURGERY

## 2018-03-05 PROCEDURE — 0SG00A0 FUSION OF LUMBAR VERTEBRAL JOINT WITH INTERBODY FUSION DEVICE, ANTERIOR APPROACH, ANTERIOR COLUMN, OPEN APPROACH: ICD-10-PCS | Performed by: ORTHOPAEDIC SURGERY

## 2018-03-05 PROCEDURE — 74011250636 HC RX REV CODE- 250/636: Performed by: ANESTHESIOLOGY

## 2018-03-05 PROCEDURE — 77030018719 HC DRSG PTCH ANTIMIC J&J -A: Performed by: ORTHOPAEDIC SURGERY

## 2018-03-05 PROCEDURE — 74011250636 HC RX REV CODE- 250/636: Performed by: ORTHOPAEDIC SURGERY

## 2018-03-05 PROCEDURE — 77030020268 HC MISC GENERAL SUPPLY: Performed by: ORTHOPAEDIC SURGERY

## 2018-03-05 PROCEDURE — 77030030943 HC ST DIL NIMS STIM MEDT -G: Performed by: ORTHOPAEDIC SURGERY

## 2018-03-05 PROCEDURE — C1713 ANCHOR/SCREW BN/BN,TIS/BN: HCPCS | Performed by: ORTHOPAEDIC SURGERY

## 2018-03-05 PROCEDURE — 77030014007 HC SPNG HEMSTAT J&J -B: Performed by: ORTHOPAEDIC SURGERY

## 2018-03-05 PROCEDURE — 77030034475 HC MISC IMPL SPN: Performed by: ORTHOPAEDIC SURGERY

## 2018-03-05 PROCEDURE — 77030014647 HC SEAL FBRN TISSL BAXT -D: Performed by: ORTHOPAEDIC SURGERY

## 2018-03-05 PROCEDURE — 74011250636 HC RX REV CODE- 250/636

## 2018-03-05 PROCEDURE — 77030003028 HC SUT VCRL J&J -A: Performed by: ORTHOPAEDIC SURGERY

## 2018-03-05 PROCEDURE — 0SB20ZZ EXCISION OF LUMBAR VERTEBRAL DISC, OPEN APPROACH: ICD-10-PCS | Performed by: ORTHOPAEDIC SURGERY

## 2018-03-05 PROCEDURE — 77030002933 HC SUT MCRYL J&J -A: Performed by: ORTHOPAEDIC SURGERY

## 2018-03-05 PROCEDURE — 77030018846 HC SOL IRR STRL H20 ICUM -A: Performed by: ORTHOPAEDIC SURGERY

## 2018-03-05 PROCEDURE — 77030022704 HC SUT VLOC COVD -B: Performed by: ORTHOPAEDIC SURGERY

## 2018-03-05 PROCEDURE — 77030034479 HC ADH SKN CLSR PRINEO J&J -B: Performed by: ORTHOPAEDIC SURGERY

## 2018-03-05 PROCEDURE — 74011000250 HC RX REV CODE- 250: Performed by: ORTHOPAEDIC SURGERY

## 2018-03-05 PROCEDURE — 77030019908 HC STETH ESOPH SIMS -A: Performed by: NURSE ANESTHETIST, CERTIFIED REGISTERED

## 2018-03-05 PROCEDURE — 77030029099 HC BN WAX SSPC -A: Performed by: ORTHOPAEDIC SURGERY

## 2018-03-05 PROCEDURE — 76001 XR FLUOROSCOPY OVER 60 MINUTES: CPT

## 2018-03-05 PROCEDURE — 81025 URINE PREGNANCY TEST: CPT

## 2018-03-05 PROCEDURE — 77030038844 HC GRFT DMB NEVOS BIOE -G1: Performed by: ORTHOPAEDIC SURGERY

## 2018-03-05 PROCEDURE — 74011000258 HC RX REV CODE- 258: Performed by: ORTHOPAEDIC SURGERY

## 2018-03-05 PROCEDURE — 77030034849: Performed by: ORTHOPAEDIC SURGERY

## 2018-03-05 PROCEDURE — 77030008467 HC STPLR SKN COVD -B: Performed by: ORTHOPAEDIC SURGERY

## 2018-03-05 PROCEDURE — 76210000016 HC OR PH I REC 1 TO 1.5 HR: Performed by: ORTHOPAEDIC SURGERY

## 2018-03-05 PROCEDURE — 0SG0071 FUSION OF LUMBAR VERTEBRAL JOINT WITH AUTOLOGOUS TISSUE SUBSTITUTE, POSTERIOR APPROACH, POSTERIOR COLUMN, OPEN APPROACH: ICD-10-PCS | Performed by: ORTHOPAEDIC SURGERY

## 2018-03-05 PROCEDURE — 77030003029 HC SUT VCRL J&J -B: Performed by: ORTHOPAEDIC SURGERY

## 2018-03-05 PROCEDURE — 77030018836 HC SOL IRR NACL ICUM -A: Performed by: ORTHOPAEDIC SURGERY

## 2018-03-05 PROCEDURE — 77030031139 HC SUT VCRL2 J&J -A: Performed by: ORTHOPAEDIC SURGERY

## 2018-03-05 PROCEDURE — 77030004391 HC BUR FLUT MEDT -C: Performed by: ORTHOPAEDIC SURGERY

## 2018-03-05 PROCEDURE — 77010033678 HC OXYGEN DAILY

## 2018-03-05 PROCEDURE — 77030020061 HC IV BLD WRMR ADMIN SET 3M -B: Performed by: ANESTHESIOLOGY

## 2018-03-05 PROCEDURE — 77030008684 HC TU ET CUF COVD -B: Performed by: NURSE ANESTHETIST, CERTIFIED REGISTERED

## 2018-03-05 PROCEDURE — 77030008771 HC TU NG SALEM SUMP -A: Performed by: NURSE ANESTHETIST, CERTIFIED REGISTERED

## 2018-03-05 PROCEDURE — 77030028539 HC KNF DSCTMY MTRX BYN MEDT -D: Performed by: ORTHOPAEDIC SURGERY

## 2018-03-05 PROCEDURE — 77030012414: Performed by: ORTHOPAEDIC SURGERY

## 2018-03-05 PROCEDURE — 72100 X-RAY EXAM L-S SPINE 2/3 VWS: CPT

## 2018-03-05 PROCEDURE — 80047 BASIC METABLC PNL IONIZED CA: CPT

## 2018-03-05 PROCEDURE — 76010000176 HC OR TIME 4.5 TO 5 HR INTENSV-TIER 1: Performed by: ORTHOPAEDIC SURGERY

## 2018-03-05 PROCEDURE — 77030013079 HC BLNKT BAIR HGGR 3M -A: Performed by: ANESTHESIOLOGY

## 2018-03-05 PROCEDURE — 77030011266 HC ELECTRD BLD INSL COVD -A: Performed by: ORTHOPAEDIC SURGERY

## 2018-03-05 PROCEDURE — 77030026438 HC STYL ET INTUB CARD -A: Performed by: NURSE ANESTHETIST, CERTIFIED REGISTERED

## 2018-03-05 PROCEDURE — 77030034169 HC GRFT BN BIOACTV INTRFC 1G BSTEB -F: Performed by: ORTHOPAEDIC SURGERY

## 2018-03-05 PROCEDURE — 74011250637 HC RX REV CODE- 250/637: Performed by: ORTHOPAEDIC SURGERY

## 2018-03-05 PROCEDURE — 76060000040 HC ANESTHESIA 4.5 TO 5 HR: Performed by: ORTHOPAEDIC SURGERY

## 2018-03-05 PROCEDURE — 77030018673: Performed by: ORTHOPAEDIC SURGERY

## 2018-03-05 PROCEDURE — 77030032490 HC SLV COMPR SCD KNE COVD -B: Performed by: ORTHOPAEDIC SURGERY

## 2018-03-05 PROCEDURE — 77030035129: Performed by: ORTHOPAEDIC SURGERY

## 2018-03-05 PROCEDURE — 77030003666 HC NDL SPINAL BD -A: Performed by: ORTHOPAEDIC SURGERY

## 2018-03-05 PROCEDURE — 01NB0ZZ RELEASE LUMBAR NERVE, OPEN APPROACH: ICD-10-PCS | Performed by: ORTHOPAEDIC SURGERY

## 2018-03-05 PROCEDURE — 77030036946 HC GRFT BN FBR CORT 3DEMIN 10CC BACT -G: Performed by: ORTHOPAEDIC SURGERY

## 2018-03-05 PROCEDURE — 77030012550: Performed by: ORTHOPAEDIC SURGERY

## 2018-03-05 PROCEDURE — 77030012961 HC IRR KT CYSTO/TUR ICUM -A: Performed by: ORTHOPAEDIC SURGERY

## 2018-03-05 PROCEDURE — 77030013567 HC DRN WND RESERV BARD -A: Performed by: ORTHOPAEDIC SURGERY

## 2018-03-05 PROCEDURE — 74011000250 HC RX REV CODE- 250

## 2018-03-05 PROCEDURE — 77030029914 HC PMP F NGP WND DRSG PICO S&N -C: Performed by: ORTHOPAEDIC SURGERY

## 2018-03-05 PROCEDURE — 65270000029 HC RM PRIVATE

## 2018-03-05 DEVICE — ALLOGRAFT BNE SPNG 50X20X7 MM CANC DBM: Type: IMPLANTABLE DEVICE | Site: SPINE LUMBAR | Status: FUNCTIONAL

## 2018-03-05 DEVICE — IMPLANTABLE DEVICE: Type: IMPLANTABLE DEVICE | Site: SPINE LUMBAR | Status: FUNCTIONAL

## 2018-03-05 DEVICE — SCREW SPNL L40MM DIA7MM PEDCL TI ALLY POLYAX CANN THRD MTRX
Type: IMPLANTABLE DEVICE | Site: SPINE LUMBAR | Status: NON-FUNCTIONAL
Removed: 2021-01-19

## 2018-03-05 DEVICE — IMPLANTABLE DEVICE
Type: IMPLANTABLE DEVICE | Site: SPINE LUMBAR | Status: NON-FUNCTIONAL
Removed: 2021-01-19

## 2018-03-05 RX ORDER — LIDOCAINE HYDROCHLORIDE 20 MG/ML
INJECTION, SOLUTION EPIDURAL; INFILTRATION; INTRACAUDAL; PERINEURAL AS NEEDED
Status: DISCONTINUED | OUTPATIENT
Start: 2018-03-05 | End: 2018-03-05 | Stop reason: HOSPADM

## 2018-03-05 RX ORDER — SODIUM CHLORIDE 0.9 % (FLUSH) 0.9 %
5-10 SYRINGE (ML) INJECTION EVERY 8 HOURS
Status: DISCONTINUED | OUTPATIENT
Start: 2018-03-05 | End: 2018-03-05 | Stop reason: HOSPADM

## 2018-03-05 RX ORDER — LISINOPRIL 20 MG/1
20 TABLET ORAL DAILY
Status: DISCONTINUED | OUTPATIENT
Start: 2018-03-06 | End: 2018-03-07

## 2018-03-05 RX ORDER — DIAZEPAM 5 MG/1
5 TABLET ORAL
Status: DISCONTINUED | OUTPATIENT
Start: 2018-03-05 | End: 2018-03-06

## 2018-03-05 RX ORDER — ACETAMINOPHEN 500 MG
1000 TABLET ORAL EVERY 6 HOURS
Status: DISCONTINUED | OUTPATIENT
Start: 2018-03-06 | End: 2018-03-07

## 2018-03-05 RX ORDER — ERGOCALCIFEROL 1.25 MG/1
50000 CAPSULE ORAL 2 TIMES WEEKLY
Status: DISCONTINUED | OUTPATIENT
Start: 2018-03-09 | End: 2018-03-07

## 2018-03-05 RX ORDER — OXYCODONE HYDROCHLORIDE 5 MG/1
5 TABLET ORAL
Status: DISCONTINUED | OUTPATIENT
Start: 2018-03-05 | End: 2018-03-07

## 2018-03-05 RX ORDER — EPHEDRINE SULFATE 50 MG/ML
INJECTION, SOLUTION INTRAVENOUS AS NEEDED
Status: DISCONTINUED | OUTPATIENT
Start: 2018-03-05 | End: 2018-03-05 | Stop reason: HOSPADM

## 2018-03-05 RX ORDER — LAMOTRIGINE 100 MG/1
50 TABLET ORAL DAILY
COMMUNITY
End: 2021-01-11 | Stop reason: DRUGHIGH

## 2018-03-05 RX ORDER — SODIUM CHLORIDE 0.9 % (FLUSH) 0.9 %
5-10 SYRINGE (ML) INJECTION AS NEEDED
Status: DISCONTINUED | OUTPATIENT
Start: 2018-03-05 | End: 2018-03-05 | Stop reason: HOSPADM

## 2018-03-05 RX ORDER — OXYCODONE HYDROCHLORIDE 5 MG/1
10-15 TABLET ORAL
Status: DISCONTINUED | OUTPATIENT
Start: 2018-03-05 | End: 2018-03-07

## 2018-03-05 RX ORDER — DEXTROSE 50 % IN WATER (D50W) INTRAVENOUS SYRINGE
12.5-25 AS NEEDED
Status: DISCONTINUED | OUTPATIENT
Start: 2018-03-05 | End: 2018-03-07

## 2018-03-05 RX ORDER — GLYCOPYRROLATE 0.2 MG/ML
INJECTION INTRAMUSCULAR; INTRAVENOUS AS NEEDED
Status: DISCONTINUED | OUTPATIENT
Start: 2018-03-05 | End: 2018-03-05 | Stop reason: HOSPADM

## 2018-03-05 RX ORDER — SODIUM CHLORIDE 9 MG/ML
125 INJECTION, SOLUTION INTRAVENOUS CONTINUOUS
Status: DISCONTINUED | OUTPATIENT
Start: 2018-03-05 | End: 2018-03-07

## 2018-03-05 RX ORDER — ROCURONIUM BROMIDE 10 MG/ML
INJECTION, SOLUTION INTRAVENOUS AS NEEDED
Status: DISCONTINUED | OUTPATIENT
Start: 2018-03-05 | End: 2018-03-05 | Stop reason: HOSPADM

## 2018-03-05 RX ORDER — OXCARBAZEPINE 150 MG/1
300 TABLET, FILM COATED ORAL 2 TIMES DAILY
Status: DISCONTINUED | OUTPATIENT
Start: 2018-03-05 | End: 2018-03-07

## 2018-03-05 RX ORDER — LAMOTRIGINE 25 MG/1
50 TABLET ORAL DAILY
Status: DISCONTINUED | OUTPATIENT
Start: 2018-03-06 | End: 2018-03-07

## 2018-03-05 RX ORDER — NEOSTIGMINE METHYLSULFATE 1 MG/ML
INJECTION INTRAVENOUS AS NEEDED
Status: DISCONTINUED | OUTPATIENT
Start: 2018-03-05 | End: 2018-03-05 | Stop reason: HOSPADM

## 2018-03-05 RX ORDER — HYDROMORPHONE HYDROCHLORIDE 1 MG/ML
.2-.5 INJECTION, SOLUTION INTRAMUSCULAR; INTRAVENOUS; SUBCUTANEOUS
Status: DISCONTINUED | OUTPATIENT
Start: 2018-03-05 | End: 2018-03-05 | Stop reason: HOSPADM

## 2018-03-05 RX ORDER — INSULIN LISPRO 100 [IU]/ML
INJECTION, SOLUTION INTRAVENOUS; SUBCUTANEOUS
Status: DISCONTINUED | OUTPATIENT
Start: 2018-03-06 | End: 2018-03-07

## 2018-03-05 RX ORDER — MORPHINE SULFATE 10 MG/ML
2 INJECTION, SOLUTION INTRAMUSCULAR; INTRAVENOUS
Status: DISCONTINUED | OUTPATIENT
Start: 2018-03-05 | End: 2018-03-05 | Stop reason: HOSPADM

## 2018-03-05 RX ORDER — FACIAL-BODY WIPES
10 EACH TOPICAL DAILY PRN
Status: DISCONTINUED | OUTPATIENT
Start: 2018-03-07 | End: 2018-03-07

## 2018-03-05 RX ORDER — HYDROMORPHONE HYDROCHLORIDE 2 MG/ML
1 INJECTION, SOLUTION INTRAMUSCULAR; INTRAVENOUS; SUBCUTANEOUS
Status: DISPENSED | OUTPATIENT
Start: 2018-03-05 | End: 2018-03-06

## 2018-03-05 RX ORDER — DEXAMETHASONE SODIUM PHOSPHATE 4 MG/ML
10 INJECTION, SOLUTION INTRA-ARTICULAR; INTRALESIONAL; INTRAMUSCULAR; INTRAVENOUS; SOFT TISSUE ONCE
Status: COMPLETED | OUTPATIENT
Start: 2018-03-06 | End: 2018-03-06

## 2018-03-05 RX ORDER — DEXAMETHASONE SODIUM PHOSPHATE 4 MG/ML
INJECTION, SOLUTION INTRA-ARTICULAR; INTRALESIONAL; INTRAMUSCULAR; INTRAVENOUS; SOFT TISSUE AS NEEDED
Status: DISCONTINUED | OUTPATIENT
Start: 2018-03-05 | End: 2018-03-05 | Stop reason: HOSPADM

## 2018-03-05 RX ORDER — PREGABALIN 75 MG/1
150 CAPSULE ORAL ONCE
Status: COMPLETED | OUTPATIENT
Start: 2018-03-05 | End: 2018-03-05

## 2018-03-05 RX ORDER — FENTANYL CITRATE 50 UG/ML
INJECTION, SOLUTION INTRAMUSCULAR; INTRAVENOUS AS NEEDED
Status: DISCONTINUED | OUTPATIENT
Start: 2018-03-05 | End: 2018-03-05 | Stop reason: HOSPADM

## 2018-03-05 RX ORDER — PHENYLEPHRINE HCL IN 0.9% NACL 0.4MG/10ML
SYRINGE (ML) INTRAVENOUS AS NEEDED
Status: DISCONTINUED | OUTPATIENT
Start: 2018-03-05 | End: 2018-03-05 | Stop reason: HOSPADM

## 2018-03-05 RX ORDER — FENTANYL CITRATE 50 UG/ML
25 INJECTION, SOLUTION INTRAMUSCULAR; INTRAVENOUS
Status: DISCONTINUED | OUTPATIENT
Start: 2018-03-05 | End: 2018-03-05 | Stop reason: HOSPADM

## 2018-03-05 RX ORDER — MAGNESIUM SULFATE 100 %
4 CRYSTALS MISCELLANEOUS AS NEEDED
Status: DISCONTINUED | OUTPATIENT
Start: 2018-03-05 | End: 2018-03-07

## 2018-03-05 RX ORDER — DIPHENHYDRAMINE HYDROCHLORIDE 50 MG/ML
12.5 INJECTION, SOLUTION INTRAMUSCULAR; INTRAVENOUS AS NEEDED
Status: DISCONTINUED | OUTPATIENT
Start: 2018-03-05 | End: 2018-03-05 | Stop reason: HOSPADM

## 2018-03-05 RX ORDER — MIDAZOLAM HYDROCHLORIDE 1 MG/ML
INJECTION, SOLUTION INTRAMUSCULAR; INTRAVENOUS AS NEEDED
Status: DISCONTINUED | OUTPATIENT
Start: 2018-03-05 | End: 2018-03-05 | Stop reason: HOSPADM

## 2018-03-05 RX ORDER — PROPOFOL 10 MG/ML
INJECTION, EMULSION INTRAVENOUS AS NEEDED
Status: DISCONTINUED | OUTPATIENT
Start: 2018-03-05 | End: 2018-03-05 | Stop reason: HOSPADM

## 2018-03-05 RX ORDER — ONDANSETRON 2 MG/ML
INJECTION INTRAMUSCULAR; INTRAVENOUS AS NEEDED
Status: DISCONTINUED | OUTPATIENT
Start: 2018-03-05 | End: 2018-03-05 | Stop reason: HOSPADM

## 2018-03-05 RX ORDER — SODIUM CHLORIDE 0.9 % (FLUSH) 0.9 %
5-10 SYRINGE (ML) INJECTION AS NEEDED
Status: DISCONTINUED | OUTPATIENT
Start: 2018-03-05 | End: 2018-03-07

## 2018-03-05 RX ORDER — NALOXONE HYDROCHLORIDE 0.4 MG/ML
2 INJECTION, SOLUTION INTRAMUSCULAR; INTRAVENOUS; SUBCUTANEOUS
Status: DISCONTINUED | OUTPATIENT
Start: 2018-03-05 | End: 2018-03-07

## 2018-03-05 RX ORDER — ATORVASTATIN CALCIUM 40 MG/1
40 TABLET, FILM COATED ORAL DAILY
Status: DISCONTINUED | OUTPATIENT
Start: 2018-03-06 | End: 2018-03-07

## 2018-03-05 RX ORDER — CLONAZEPAM 0.5 MG/1
0.5 TABLET ORAL
Status: DISCONTINUED | OUTPATIENT
Start: 2018-03-06 | End: 2018-03-07

## 2018-03-05 RX ORDER — SUCCINYLCHOLINE CHLORIDE 20 MG/ML
INJECTION INTRAMUSCULAR; INTRAVENOUS AS NEEDED
Status: DISCONTINUED | OUTPATIENT
Start: 2018-03-05 | End: 2018-03-05 | Stop reason: HOSPADM

## 2018-03-05 RX ORDER — GABAPENTIN 300 MG/1
300 CAPSULE ORAL 2 TIMES DAILY
Status: DISCONTINUED | OUTPATIENT
Start: 2018-03-05 | End: 2018-03-07

## 2018-03-05 RX ORDER — HYDROXYZINE HYDROCHLORIDE 10 MG/1
10 TABLET, FILM COATED ORAL
Status: DISCONTINUED | OUTPATIENT
Start: 2018-03-05 | End: 2018-03-07

## 2018-03-05 RX ORDER — POLYETHYLENE GLYCOL 3350 17 G/17G
17 POWDER, FOR SOLUTION ORAL DAILY
Status: DISCONTINUED | OUTPATIENT
Start: 2018-03-06 | End: 2018-03-07

## 2018-03-05 RX ORDER — SODIUM CHLORIDE 0.9 % (FLUSH) 0.9 %
5-10 SYRINGE (ML) INJECTION EVERY 8 HOURS
Status: DISCONTINUED | OUTPATIENT
Start: 2018-03-06 | End: 2018-03-07

## 2018-03-05 RX ORDER — METFORMIN HYDROCHLORIDE 500 MG/1
1000 TABLET ORAL 2 TIMES DAILY WITH MEALS
Status: DISCONTINUED | OUTPATIENT
Start: 2018-03-06 | End: 2018-03-07

## 2018-03-05 RX ORDER — FAMOTIDINE 20 MG/1
20 TABLET, FILM COATED ORAL 2 TIMES DAILY
Status: DISCONTINUED | OUTPATIENT
Start: 2018-03-05 | End: 2018-03-07

## 2018-03-05 RX ORDER — CEFAZOLIN SODIUM/WATER 2 G/20 ML
2 SYRINGE (ML) INTRAVENOUS EVERY 8 HOURS
Status: DISCONTINUED | OUTPATIENT
Start: 2018-03-05 | End: 2018-03-05 | Stop reason: SDUPTHER

## 2018-03-05 RX ORDER — ACETAMINOPHEN 10 MG/ML
1000 INJECTION, SOLUTION INTRAVENOUS ONCE
Status: COMPLETED | OUTPATIENT
Start: 2018-03-05 | End: 2018-03-05

## 2018-03-05 RX ORDER — CLONAZEPAM 1 MG/1
1 TABLET ORAL 2 TIMES DAILY
Status: DISCONTINUED | OUTPATIENT
Start: 2018-03-05 | End: 2018-03-07

## 2018-03-05 RX ORDER — SODIUM CHLORIDE, SODIUM LACTATE, POTASSIUM CHLORIDE, CALCIUM CHLORIDE 600; 310; 30; 20 MG/100ML; MG/100ML; MG/100ML; MG/100ML
25 INJECTION, SOLUTION INTRAVENOUS CONTINUOUS
Status: DISCONTINUED | OUTPATIENT
Start: 2018-03-05 | End: 2018-03-05 | Stop reason: HOSPADM

## 2018-03-05 RX ORDER — AMOXICILLIN 250 MG
1 CAPSULE ORAL 2 TIMES DAILY
Status: DISCONTINUED | OUTPATIENT
Start: 2018-03-05 | End: 2018-03-07

## 2018-03-05 RX ORDER — THERA TABS 400 MCG
1 TAB ORAL DAILY
Status: DISCONTINUED | OUTPATIENT
Start: 2018-03-06 | End: 2018-03-07

## 2018-03-05 RX ORDER — ONDANSETRON 2 MG/ML
4 INJECTION INTRAMUSCULAR; INTRAVENOUS
Status: ACTIVE | OUTPATIENT
Start: 2018-03-05 | End: 2018-03-06

## 2018-03-05 RX ORDER — CYCLOBENZAPRINE HCL 10 MG
10 TABLET ORAL
Status: DISCONTINUED | OUTPATIENT
Start: 2018-03-05 | End: 2018-03-07

## 2018-03-05 RX ORDER — INSULIN LISPRO 100 [IU]/ML
8 INJECTION, SOLUTION INTRAVENOUS; SUBCUTANEOUS
Status: DISCONTINUED | OUTPATIENT
Start: 2018-03-06 | End: 2018-03-07

## 2018-03-05 RX ORDER — HYDROMORPHONE HYDROCHLORIDE 2 MG/ML
INJECTION, SOLUTION INTRAMUSCULAR; INTRAVENOUS; SUBCUTANEOUS AS NEEDED
Status: DISCONTINUED | OUTPATIENT
Start: 2018-03-05 | End: 2018-03-05 | Stop reason: HOSPADM

## 2018-03-05 RX ORDER — ONDANSETRON 2 MG/ML
4 INJECTION INTRAMUSCULAR; INTRAVENOUS AS NEEDED
Status: DISCONTINUED | OUTPATIENT
Start: 2018-03-05 | End: 2018-03-05 | Stop reason: HOSPADM

## 2018-03-05 RX ORDER — NALOXONE HYDROCHLORIDE 0.4 MG/ML
0.4 INJECTION, SOLUTION INTRAMUSCULAR; INTRAVENOUS; SUBCUTANEOUS AS NEEDED
Status: DISCONTINUED | OUTPATIENT
Start: 2018-03-05 | End: 2018-03-07

## 2018-03-05 RX ORDER — BACLOFEN 10 MG/1
10 TABLET ORAL EVERY EVENING
Status: DISCONTINUED | OUTPATIENT
Start: 2018-03-06 | End: 2018-03-07

## 2018-03-05 RX ADMIN — ACETAMINOPHEN 1000 MG: 10 INJECTION, SOLUTION INTRAVENOUS at 13:07

## 2018-03-05 RX ADMIN — HYDROMORPHONE HYDROCHLORIDE 0.5 MG: 1 INJECTION, SOLUTION INTRAMUSCULAR; INTRAVENOUS; SUBCUTANEOUS at 20:11

## 2018-03-05 RX ADMIN — EPHEDRINE SULFATE 5 MG: 50 INJECTION, SOLUTION INTRAVENOUS at 16:06

## 2018-03-05 RX ADMIN — Medication 40 MCG: at 18:17

## 2018-03-05 RX ADMIN — PROPOFOL 30 MG: 10 INJECTION, EMULSION INTRAVENOUS at 15:42

## 2018-03-05 RX ADMIN — SODIUM CHLORIDE 125 ML/HR: 900 INJECTION, SOLUTION INTRAVENOUS at 19:57

## 2018-03-05 RX ADMIN — HYDROMORPHONE HYDROCHLORIDE 0.3 MG: 1 INJECTION, SOLUTION INTRAMUSCULAR; INTRAVENOUS; SUBCUTANEOUS at 20:30

## 2018-03-05 RX ADMIN — SODIUM CHLORIDE, SODIUM LACTATE, POTASSIUM CHLORIDE, AND CALCIUM CHLORIDE 25 ML/HR: 600; 310; 30; 20 INJECTION, SOLUTION INTRAVENOUS at 12:54

## 2018-03-05 RX ADMIN — FENTANYL CITRATE 25 MCG: 50 INJECTION, SOLUTION INTRAMUSCULAR; INTRAVENOUS at 19:43

## 2018-03-05 RX ADMIN — SODIUM CHLORIDE, SODIUM LACTATE, POTASSIUM CHLORIDE, AND CALCIUM CHLORIDE: 600; 310; 30; 20 INJECTION, SOLUTION INTRAVENOUS at 19:10

## 2018-03-05 RX ADMIN — Medication 2 G: at 22:00

## 2018-03-05 RX ADMIN — HYDROMORPHONE HYDROCHLORIDE 0.2 MG: 2 INJECTION, SOLUTION INTRAMUSCULAR; INTRAVENOUS; SUBCUTANEOUS at 17:20

## 2018-03-05 RX ADMIN — HYDROMORPHONE HYDROCHLORIDE 0.6 MG: 2 INJECTION, SOLUTION INTRAMUSCULAR; INTRAVENOUS; SUBCUTANEOUS at 16:19

## 2018-03-05 RX ADMIN — HYDROMORPHONE HYDROCHLORIDE 0.2 MG: 2 INJECTION, SOLUTION INTRAMUSCULAR; INTRAVENOUS; SUBCUTANEOUS at 18:03

## 2018-03-05 RX ADMIN — ROCURONIUM BROMIDE 20 MG: 10 INJECTION, SOLUTION INTRAVENOUS at 17:20

## 2018-03-05 RX ADMIN — EPHEDRINE SULFATE 5 MG: 50 INJECTION, SOLUTION INTRAVENOUS at 17:30

## 2018-03-05 RX ADMIN — GLYCOPYRROLATE 0.4 MG: 0.2 INJECTION INTRAMUSCULAR; INTRAVENOUS at 18:59

## 2018-03-05 RX ADMIN — CEFAZOLIN 3 G: 1 INJECTION, POWDER, FOR SOLUTION INTRAMUSCULAR; INTRAVENOUS; PARENTERAL at 14:48

## 2018-03-05 RX ADMIN — FENTANYL CITRATE 25 MCG: 50 INJECTION, SOLUTION INTRAMUSCULAR; INTRAVENOUS at 19:46

## 2018-03-05 RX ADMIN — SUCCINYLCHOLINE CHLORIDE 160 MG: 20 INJECTION INTRAMUSCULAR; INTRAVENOUS at 14:41

## 2018-03-05 RX ADMIN — HYDROMORPHONE HYDROCHLORIDE 1 MG: 2 INJECTION, SOLUTION INTRAMUSCULAR; INTRAVENOUS; SUBCUTANEOUS at 22:36

## 2018-03-05 RX ADMIN — GABAPENTIN 300 MG: 300 CAPSULE ORAL at 22:32

## 2018-03-05 RX ADMIN — ROCURONIUM BROMIDE 10 MG: 10 INJECTION, SOLUTION INTRAVENOUS at 18:03

## 2018-03-05 RX ADMIN — DOCUSATE SODIUM AND SENNOSIDES 1 TABLET: 8.6; 5 TABLET, FILM COATED ORAL at 22:00

## 2018-03-05 RX ADMIN — ROCURONIUM BROMIDE 10 MG: 10 INJECTION, SOLUTION INTRAVENOUS at 16:02

## 2018-03-05 RX ADMIN — FAMOTIDINE 20 MG: 20 TABLET, FILM COATED ORAL at 22:31

## 2018-03-05 RX ADMIN — EPHEDRINE SULFATE 5 MG: 50 INJECTION, SOLUTION INTRAVENOUS at 18:09

## 2018-03-05 RX ADMIN — DIAZEPAM 5 MG: 5 TABLET ORAL at 20:19

## 2018-03-05 RX ADMIN — HYDROMORPHONE HYDROCHLORIDE 0.5 MG: 1 INJECTION, SOLUTION INTRAMUSCULAR; INTRAVENOUS; SUBCUTANEOUS at 19:55

## 2018-03-05 RX ADMIN — ONDANSETRON 4 MG: 2 INJECTION INTRAMUSCULAR; INTRAVENOUS at 18:40

## 2018-03-05 RX ADMIN — NEOSTIGMINE METHYLSULFATE 3 MG: 1 INJECTION INTRAVENOUS at 18:59

## 2018-03-05 RX ADMIN — FENTANYL CITRATE 100 MCG: 50 INJECTION, SOLUTION INTRAMUSCULAR; INTRAVENOUS at 14:41

## 2018-03-05 RX ADMIN — CEFAZOLIN 3 G: 1 INJECTION, POWDER, FOR SOLUTION INTRAMUSCULAR; INTRAVENOUS; PARENTERAL at 18:43

## 2018-03-05 RX ADMIN — ROCURONIUM BROMIDE 15 MG: 10 INJECTION, SOLUTION INTRAVENOUS at 15:42

## 2018-03-05 RX ADMIN — FENTANYL CITRATE 25 MCG: 50 INJECTION, SOLUTION INTRAMUSCULAR; INTRAVENOUS at 19:35

## 2018-03-05 RX ADMIN — EPHEDRINE SULFATE 5 MG: 50 INJECTION, SOLUTION INTRAVENOUS at 16:09

## 2018-03-05 RX ADMIN — EPHEDRINE SULFATE 5 MG: 50 INJECTION, SOLUTION INTRAVENOUS at 18:44

## 2018-03-05 RX ADMIN — PROPOFOL 170 MG: 10 INJECTION, EMULSION INTRAVENOUS at 14:41

## 2018-03-05 RX ADMIN — ROCURONIUM BROMIDE 20 MG: 10 INJECTION, SOLUTION INTRAVENOUS at 16:19

## 2018-03-05 RX ADMIN — EPHEDRINE SULFATE 5 MG: 50 INJECTION, SOLUTION INTRAVENOUS at 18:17

## 2018-03-05 RX ADMIN — Medication 40 MCG: at 16:09

## 2018-03-05 RX ADMIN — HYDROMORPHONE HYDROCHLORIDE 0.4 MG: 2 INJECTION, SOLUTION INTRAMUSCULAR; INTRAVENOUS; SUBCUTANEOUS at 15:28

## 2018-03-05 RX ADMIN — ROCURONIUM BROMIDE 5 MG: 10 INJECTION, SOLUTION INTRAVENOUS at 14:41

## 2018-03-05 RX ADMIN — LIDOCAINE HYDROCHLORIDE 80 MG: 20 INJECTION, SOLUTION EPIDURAL; INFILTRATION; INTRACAUDAL; PERINEURAL at 14:41

## 2018-03-05 RX ADMIN — MIDAZOLAM HYDROCHLORIDE 2 MG: 1 INJECTION, SOLUTION INTRAMUSCULAR; INTRAVENOUS at 14:36

## 2018-03-05 RX ADMIN — Medication 40 MCG: at 17:30

## 2018-03-05 RX ADMIN — PREGABALIN 150 MG: 75 CAPSULE ORAL at 13:04

## 2018-03-05 RX ADMIN — Medication 40 MCG: at 18:09

## 2018-03-05 RX ADMIN — DEXAMETHASONE SODIUM PHOSPHATE 10 MG: 4 INJECTION, SOLUTION INTRA-ARTICULAR; INTRALESIONAL; INTRAMUSCULAR; INTRAVENOUS; SOFT TISSUE at 15:15

## 2018-03-05 RX ADMIN — ROCURONIUM BROMIDE 30 MG: 10 INJECTION, SOLUTION INTRAVENOUS at 14:56

## 2018-03-05 RX ADMIN — OXCARBAZEPINE 300 MG: 150 TABLET ORAL at 22:33

## 2018-03-05 RX ADMIN — SODIUM CHLORIDE, SODIUM LACTATE, POTASSIUM CHLORIDE, AND CALCIUM CHLORIDE: 600; 310; 30; 20 INJECTION, SOLUTION INTRAVENOUS at 15:57

## 2018-03-05 RX ADMIN — FENTANYL CITRATE 25 MCG: 50 INJECTION, SOLUTION INTRAMUSCULAR; INTRAVENOUS at 19:39

## 2018-03-05 RX ADMIN — Medication 40 MCG: at 18:44

## 2018-03-05 RX ADMIN — GLYCOPYRROLATE 0.2 MG: 0.2 INJECTION INTRAMUSCULAR; INTRAVENOUS at 16:03

## 2018-03-05 RX ADMIN — CLONAZEPAM 1 MG: 1 TABLET ORAL at 22:32

## 2018-03-05 NOTE — PERIOP NOTES
1300:  Dr. Gutiérrez Speak in to see the patient    [phone]:  Dr. Tamir Lobos in to see the patient

## 2018-03-05 NOTE — IP AVS SNAPSHOT
[address] [facility] 
[phone] Patient: Malachi Rossi MRN: [id number] :1970 About your hospitalization You were admitted on:  2018 You last received care in the:  [facility] ORTHOPEDICS You were discharged on:  2018 Why you were hospitalized Your primary diagnosis was:  S/P Lumbar Spinal Fusion Your diagnoses also included:  Spondylisthesis Follow-up Information Follow up With Details Comments Contact Info Madi Matias MD In 2 weeks  [facility] 
[phone] Ebony Cardozo MD   [address] [facility] [address] [place] [id number] [phone] AT HOME CARE On 3/8/2018 This is the provider of your home health needs. If you have not heard from them within 24 hours post discharge, please give them a call. [address] [place] [address] 
[phone] [facility] On 3/8/2018 This is the provider of your equipment. If you have questions, please give them a call. [address] [place] [address] 
[phone] Discharge Orders None A check angelic indicates which time of day the medication should be taken. My Medications START taking these medications Instructions Each Dose to Equal  
 Morning Noon Evening Bedtime  
 oxyCODONE IR 5 mg immediate release tablet Commonly known as:  Cas Arroyo Your last dose was: Your next dose is: Take 1-2 Tabs by mouth every four (4) hours as needed. Max Daily Amount: 60 mg. One tab for mild to moderate pain level 1-6, or 2 tabs for severe pain level 7-10  
 5-10 mg  
    
   
   
   
  
 polyethylene glycol 17 gram packet Commonly known as:  Venetta Linear Your last dose was: Your next dose is: Take 1 Packet by mouth daily as needed (constipation) for up to 15 days. 17 g senna-docusate 8.6-50 mg per tablet Commonly known as:  Milton Ramirez Your last dose was: Your next dose is: Take 1 Tab by mouth daily. 1 Tab CONTINUE taking these medications Instructions Each Dose to Equal  
 Morning Noon Evening Bedtime  
 atorvastatin 40 mg tablet Commonly known as:  LIPITOR Your last dose was: Your next dose is: Take 40 mg by mouth daily. 40 mg  
    
   
   
   
  
 baclofen 10 mg tablet Commonly known as:  LIORESAL Your last dose was: Your next dose is: Take 10 mg by mouth every evening. 10 mg  
    
   
   
   
  
 * clonazePAM 1 mg tablet Commonly known as:  Marian Sandoval Your last dose was: Your next dose is: Take 1 mg by mouth two (2) times a day. Administration Instructions: Patient takes 1 mg in the morning and evening and 0.5 mg in the afternoon. 1 mg * clonazePAM 1 mg tablet Commonly known as:  Marian Sandoval Your last dose was: Your next dose is: Take 0.5 mg by mouth daily (with lunch). Administration Instructions: Patient takes 1 mg in the morning and evening and 0.5 mg in the afternoon. 0.5 mg  
    
   
   
   
  
 gabapentin 300 mg capsule Commonly known as:  NEURONTIN Your last dose was: Your next dose is: Take 300 mg by mouth two (2) times a day. 300 mg  
    
   
   
   
  
 insulin lispro 100 unit/mL kwikpen Commonly known as:  HUMALOG Your last dose was: Your next dose is:    
   
   
 8 Units by SubCUTAneous route Before breakfast and dinner. 8 Units LaMICtal 100 mg tablet Generic drug:  lamoTRIgine Your last dose was: Your next dose is: Take 50 mg by mouth daily. 50 mg  
    
   
   
   
  
 LATUDA 80 mg Tab tablet Generic drug:  lurasidone Your last dose was: Your next dose is: Take 80 mg by mouth daily. 80 mg  
    
   
   
   
  
 lisinopril 20 mg tablet Commonly known as:  Crispin Candie Your last dose was: Your next dose is: Take 20 mg by mouth daily. 20 mg  
    
   
   
   
  
 meloxicam 15 mg tablet Commonly known as:  MOBIC Your last dose was: Your next dose is: Take 15 mg by mouth daily. 15 mg  
    
   
   
   
  
 metFORMIN 1,000 mg tablet Commonly known as:  GLUCOPHAGE Your last dose was: Your next dose is: Take 1,000 mg by mouth two (2) times daily (with meals). 1000 mg  
    
   
   
   
  
 multivitamin tablet Commonly known as:  ONE A DAY Your last dose was: Your next dose is: Take 1 Tab by mouth daily. 1 Tab  
    
   
   
   
  
 naloxone 1 mg/mL injection Commonly known as:  [employer] Your last dose was: Your next dose is:    
   
   
 2 mL by IntraMUSCular route once as needed for up to 1 dose. 2 mg OXcarbaxepine 600 mg tablet Commonly known as:  TRILEPTAL Your last dose was: Your next dose is: Take 0.5 Tabs by mouth two (2) times a day. 300 mg  
    
   
   
   
  
 TYLENOL EXTRA STRENGTH 500 mg tablet Generic drug:  acetaminophen Your last dose was: Your next dose is: Take 500 mg by mouth every six (6) hours as needed for Pain. 500 mg  
    
   
   
   
  
 VITAMIN D2 50,000 unit capsule Generic drug:  ergocalciferol Your last dose was: Your next dose is: Take 50,000 Units by mouth two (2) times a week. on Monday and Thursday [address] Units * Notice: This list has 2 medication(s) that are the same as other medications prescribed for you.  Read the directions carefully, and ask your doctor or other care provider to review them with you. Where to Get Your Medications Information on where to get these meds will be given to you by the nurse or doctor. ! Ask your nurse or doctor about these medications  
  oxyCODONE IR 5 mg immediate release tablet  
 polyethylene glycol 17 gram packet  
 senna-docusate 8.6-50 mg per tablet Discharge Instructions [address] [facility] Alexi Lopez Discharge Instruction Sheet: POSTERIOR SPINAL FUSION and Decompression L4-5 
 
DR. LARA Pain control: 
 Typically, we will prescribe a narcotic usually 1-2 tabs every four hours is  
 sufficient for the pain. Most patients need this only for the first few weeks. You 
 should discontinue this as the pain decreases. You should not drive while taking any narcotic pain medications. Constipation Pain medicines and anesthesia can be constipating-this can be prevented by gentle physical activity and drinking plenty of fluid. It should be treated with over-the-counter medications such as Miralax or suppositories, and/or Fleets enema. You should have a bowel movement at least every other day following surgery. Incision care Keep this area clean and dry. Your dressing is designed to stay in place for 5-7 days. You will be sent home with one additional dressing to change at that time. Leave this new dressing in place until our follow up visit in the office in about 10-14 days. If staples are in place, they should be removed about 14-20 days after surgery. You may shower with this impermeable dressing in place. DO NOT take a tub bath or go swimming until cleared by your doctor. DO NOT apply lotions, oils, or creams to incision. To increase and promote healing: 
? Stop Smoking (or at least cut back on smoking). ? Eat a well-balanced diet (high in protein and vitamin C) ? If your appetite is poor, consider nutritional supplements like Ensure, Glucerna, or [place] Instant Breakfast. 
? If you are diabetic, controlling you blood sugars is very important to prevent infection and promote wound healing. Nutrition: ? If you were on a supplement such as Ensure or Glucerna) while in the hospital, please continue using them with each meal for the next 30 days. ? Eat a well-balanced diet - High in protein, high in vitamins and minerals, especially vitamin C and zinc.  
 
Restrictions: 
 Remember your \"BLT's\" 1. Limited bending at waist 
  2. Lift no more than 10 pounds 3. No Twisting If you were given a brace, wear it when out of bed. Warning signs : Please call your physician immediately at [phone] if you have ? Bleeding from incision that is constant. ? Change in mental status (unusual behavior or confusion) ? If your incision develops redness or swelling 
? Change in wound drainage (increase in amount, color, or foul odor) ? [place] over 101.5 degrees Fahrenheit  
? Headache that is not relieved with pain medication ? Tenderness or redness in the calf of your leg Emergency: CALL 911 if you have ? Shortness of breath ? Chest pain ? Localized chest pain when coughing or taking a deep breath Follow-up Please call Dr. Renae [place] office for a follow up appointment in 2 weeks at [phone]. You can return to work when cleared by a physician. During normal business hours you may reach Dr. Yuko Tee' team directly at [phone] if you have concerns or questions. Lanette Ng [employer] Announcement We are excited to announce that we are making your provider's discharge notes available to you in [employer]. You will see these notes when they are completed and signed by the physician that discharged you from your recent hospital stay.   If you have any questions or concerns about any information you see in [employer], please call the Health Information Department where you were seen or reach out to your Primary Care Provider for more information about your plan of care. Introducing [address]! [facility] introduces [employer] patient portal. Now you can access parts of your medical record, email your doctor's office, and request medication refills online. 1. In your internet browser, go to [email or web address] 2. Click on the First Time User? Click Here link in the Sign In box. You will see the New Member Sign Up page. 3. Enter your [employer] Access Code exactly as it appears below. You will not need to use this code after youve completed the sign-up process. If you do not sign up before the expiration date, you must request a new code. · [employer] Access Code: [email or web address] Expires: 5/24/2018  2:39 PM 
 
4. Enter the last four digits of your Social Security Number (xxxx) and Date of Birth (mm/dd/yyyy) as indicated and click Submit. You will be taken to the next sign-up page. 5. Create a [employer] ID. This will be your [employer] login ID and cannot be changed, so think of one that is secure and easy to remember. 6. Create a [employer] password. You can change your password at any time. 7. Enter your Password Reset Question and Answer. This can be used at a later time if you forget your password. 8. Enter your e-mail address. You will receive e-mail notification when new information is available in [address] 9. Click Sign Up. You can now view and download portions of your medical record. 10. Click the Download Summary menu link to download a portable copy of your medical information. If you have questions, please visit the Frequently Asked Questions section of the [employer] website. Remember, [employer] is NOT to be used for urgent needs. For medical emergencies, dial 911. Now available from your iPhone and Android! Unresulted Labs-Please follow up with your PCP about these lab tests Order Current Status XR FLUOROSCOPY OVER 60 MINUTES In process XR FLUOROSCOPY OVER 60 MINUTES In process Providers Seen During Your Hospitalization Provider Specialty Primary office phone Harris Jeffries MD Orthopedic Surgery [phone] Your Primary Care Physician (PCP) Primary Care Physician Office Phone Office Fax [address] [address] [phone] You are allergic to the following Allergen Reactions Darvon (Propoxyphene) Rash Recent Documentation Height Weight BMI OB Status Smoking Status 1.575 m 121.1 kg 48.83 kg/m2 Having regular periods Never Smoker Emergency Contacts Name Discharge Info Relation Home Work Mobile Rikki Sanon DISCHARGE CAREGIVER [3] Spouse [3]   [phone] Patient Belongings The following personal items are in your possession at time of discharge: 
  Dental Appliances: None  Visual Aid: None   Hearing Aids/Status: Does not own  Home Medications: None   Jewelry: None  Clothing: Footwear, Socks, Pants, Shirt, Undergarments (To PACU)    Other Valuables: None  Personal Items Sent to Safe: Declines Please provide this summary of care documentation to your next provider. Signatures-by signing, you are acknowledging that this After Visit Summary has been reviewed with you and you have received a copy. Patient Signature:  ____________________________________________________________ Date:  ____________________________________________________________  
  
Sheila Rincon Provider Signature:  ____________________________________________________________ Date:  ____________________________________________________________

## 2018-03-05 NOTE — PROGRESS NOTES
Ortho/ NeuroSurgery NP Note    POD# 0  s/p L4-5 LATERAL FUSION WITH BONE MARROW ASPIRATION FROM LEFT  ILIAC CREST    This note serves as a record of a chart review. Most Recent Labs:   Lab Results   Component Value Date/Time    HGB 11.5 02/22/2018 06:09 AM    Hemoglobin A1c 6.5 (H) 02/21/2018 01:16 PM     Hyponatremic at [facility] and was admitted for management of this between [facility] visit and surgery date. Sodium on admission today 135. MRSA Screen Pre-op Negative  U/A Screen Pre-Op Negative    Body mass index is 48.83 kg/(m^2). BMI greater than 30 is classified as obesity. STOP BANG Score: 3  Pre-op Incentive Spirometry Volume: 2500    Plan:  1) PT BID starting tomorrow. 2) Martha-op Antibiotics Ancef  3) Discharge planning will begin on POD #1.      Magdalena Delgado NP

## 2018-03-05 NOTE — PERIOP NOTES
Radiologist Dr Sherren Man spoke with Karlos ZAMORA and reported that there were no extra instrumentation left in lateral incision. Dr Sherren Man cleared films from incorrect count because of and EXTRA blade on field.

## 2018-03-05 NOTE — ANESTHESIA PREPROCEDURE EVALUATION
Anesthetic History   No history of anesthetic complications            Review of Systems / Medical History  Patient summary reviewed, nursing notes reviewed and pertinent labs reviewed    Pulmonary  Within defined limits                 Neuro/Psych         Psychiatric history     Cardiovascular    Hypertension              Exercise tolerance: >4 METS     GI/Hepatic/Renal  Within defined limits              Endo/Other    Diabetes    Morbid obesity     Other Findings   Comments: PTSD  Bipolar  Dissociative identity disorder          Physical Exam    Airway  Mallampati: IV  TM Distance: 4 - 6 cm  Neck ROM: normal range of motion, short neck   Mouth opening: Diminished (comment)     Cardiovascular  Regular rate and rhythm,  S1 and S2 normal,  no murmur, click, rub, or gallop             Dental  No notable dental hx       Pulmonary  Breath sounds clear to auscultation               Abdominal  GI exam deferred       Other Findings            Anesthetic Plan    ASA: 3  Anesthesia type: general            Anesthetic plan and risks discussed with: Patient      Have glidescope in OR

## 2018-03-05 NOTE — BRIEF OP NOTE
BRIEF OPERATIVE NOTE    Date of Procedure: 3/5/2018   Preoperative Diagnosis: LUMBAGO, RADICULOPATHY, STENOSIS, SPONDELOLITHISIS  Postoperative Diagnosis: LUMBAGO, RADICULOPATHY, STENOSIS, SPONDELOLITHISIS    Procedure(s):  L4-5 LATERAL FUSION WITH BONE MARROW ASPIRATION FROM LEFT  ILIAC CREST   Surgeon(s) and Role:  Panel 1:     * Candice Almonte MD - Primary    Panel 2:     * Candice Almonte MD - Primary         Assistant Staff: Physician Assistant: Olinda Hamman, [place]      Surgical Staff:  Circ-1: Zoraida Reyes RN  Physician Assistant: Olinda Hamman, [place]  Radiology Technician: Helen Bullard; Lana Urrutia,   Scrub Tech-1: Ivelisse Houser  Scrub Tech-Relief: Jeremiah Jay  Float Staff: Osvaldo Brantley, ZOEY; Karin Meyer; Ashwini Chamberlain, ZOEY; Nina Jacobsen  Event Time In   Incision Start 1525   Incision Close      Anesthesia: General   Estimated Blood Loss: 20cc  Specimens: * No specimens in log *   Findings: spondylolisthesis   Complications: none  Implants:   Implant Name Type Inv.  Item Serial No.  Lot No. LRB No. Used Action   Small Signafuse Bioactive bone graft   [facility] [id number] N/A 2 Implanted   Interface Bioactive Bone Graft   N/A [employer] [facility] [employer] [id number] N/A 1 Implanted   CAGE 12 DEG 10mm x 55mm,22,,   N/A MEDTRONIC 26CS N/A 1 Implanted   2 hole large plate     N/A [employer] [id number] N/A 1 Implanted

## 2018-03-05 NOTE — BRIEF OP NOTE
BRIEF OPERATIVE NOTE    Date of Procedure: 3/5/2018   Preoperative Diagnosis: LUMBAGO, RADICULOPATHY, STENOSIS, SPONDELOLITHISIS  Postoperative Diagnosis: LUMBAGO, RADICULOPATHY, STENOSIS, SPONDELOLITHISIS    Procedure(s):  L4-L5  POSTERIOR DECOMPRESSION AND FUSION  Surgeon(s) and Role:  Panel 1:     * Britta Slade MD - Primary    Panel 2:     * Britta Slade MD - Primary         Assistant Staff: Physician Assistant: Russ Osei      Surgical Staff:  Circ-1: Iqra Dougherty RN  Physician Assistant: Russ Osei  Radiology Technician: Amy Yang; Rosalia Kitchen, RT  Scrub Tech-1: Eric Quesada  Scrub Tech-Relief: Chino Nassar  Float Staff: Piter Almanza RN; Ladonna Puga; Alireza Piper RN; Sy Awad  Event Time In   Incision Start 1525   Incision Close      Anesthesia: General   Estimated Blood Loss: 100cc  Specimens: * No specimens in log *   Findings: stenosis/spondylolisthesis   Complications: None  Implants:   Implant Name Type Inv.  Item Serial No.  Lot No. LRB No. Used Action   Small Signafuse Bioactive bone graft   N/A [employer] [id number] N/A 2 Implanted   Interface Bioactive Bone Graft   N/A [employer] [id number] N/A 1 Implanted   CAGE 12 DEG 10mm x 55mm,22,,   N/A MED[employer] 26CS N/A 1 Implanted   2 hole large plate   N/A [employer] [id number] N/A 1 Implanted   screw 20mm xv 5.5 mm   N/A [employer] [id number] N/A 2 Implanted   GRAFT FIBERS BNE WYATT 10CC -- 3DEMIN - [id number]  GRAFT FIBERS BNE WYATT 10CC -- 3DEMIN [id number] [employer] N/A N/A 1 Implanted   GRAFT SPNG DMB CANC [id number] -- EARLOS - [id number]  GRAFT SPNG DMB CANC [id number] -- EARLOS [id number] [employer] N/A N/A 1 Implanted   SCR SPNE JASON THRD 7X40MM TI -- MATRIX - SN/A  SCR SPNE JASON THRD 7X40MM TI -- [employer]RIX N/A [employer]THES [place] N/A N/A 4 Implanted   reduction head   N/A SYNTHES SPINAL N/A N/A 4 Implanted   Locking Cap   N/A SYNTHES SPINAL N/A N/A 4 Implanted   ERNST CRV MIS 5.5X45MM TI --  - SN/A   ERNST CRV MIS 5.5X45MM TI --  N/A [facility] [place] N/A N/A 2 Implanted

## 2018-03-05 NOTE — H&P
Subjective   Subjective:      Patient ID: Kamala Schneider is a 41 y.o. female.     Chief Complaint: Follow-up of the Lower Back        HPI:  Kamala Schneider is a 41 y.o. female with complaints of pain low back with radiation down bilateral lower extremities. At last visit we tried multiple epidural steroid injections at L4-5, L5-S1. She notes the most recent injection did not give her any relief. She continues to have pain in her low back that will radiate down bilateral lower extremities going all the way down to the feet. She has a history of having L5-S1 anterior fusion done with Dr. Arian Bright and in further workup here we had noted spondylolisthesis of L4 over L5. She has tried physical therapy, anti-inflammatories, nerve medication, muscle relaxant without significant relief. She has been working on weight loss and while she has made some gains has not reached her goal weight. She has been trying dieting and has been visiting with the nutritionist although has been very limited on the amount of exercise she is able to do given the pain in her back and her legs. Symptoms are worsened with standing and walking and relieved with sitting and lying down. It is rated 8 out of 10 on the VAS.             Patient Active Problem List     Diagnosis Date Noted    Lumbar radiculopathy 07/14/2017    Low back pain 07/14/2017    Sciatica 07/14/2017    Sciatica of left side 07/12/2017    Spinal stenosis, lumbar region, with neurogenic claudication 07/12/2017    Chronic bilateral low back pain with left-sided sciatica 06/07/2017    Osteoarthritis of spine with radiculopathy, lumbar region 06/07/2017    Spondylolisthesis, lumbar region 06/07/2017    S/P lumbar fusion 06/07/2017            Current Outpatient Prescriptions:     atorvastatin (LIPITOR) 40 MG tablet, TK 1 T PO  D, Disp: , Rfl: 11    baclofen (LIORESAL) 10 MG tablet, TAKE 1 TABLET(10 MG) BY MOUTH THREE TIMES DAILY AS NEEDED FOR MUSCLE SPASMS, Disp: 270 tablet, Rfl: 0    clonazePAM (KlonoPIN) 1 MG tablet, TK 1 T PO BID PRF ANXIETY, Disp: , Rfl: 1    dicloxacillin (DYNAPEN) 500 MG capsule, TK ONE C PO  Q 6 H ONE HOUR BEFORE A MEAL OR 2 H AFTER A MEAL FOR 14 DAYS, Disp: , Rfl: 0    FREESTYLE LITE test strip, USE 1 STRIP TO TEST BLOOD GLUCOSE TID, Disp: , Rfl: 5    gabapentin (NEURONTIN) 300 MG capsule, Take 3 capsules (900 mg total) by mouth 3 (three) times a day., Disp: 270 capsule, Rfl: 11    LATUDA 40 MG tablet, , Disp: , Rfl: 5    LATUDA 60 MG tablet, , Disp: , Rfl: 2    LEVEMIR FLEXTOUCH 100 UNIT/ML solution pen-injector, INJ 15 UNITS QHS UTD, Disp: , Rfl: 11    lisinopril (PRINIVIL,ZESTRIL) 20 MG tablet, TK 1 T PO  QD, Disp: , Rfl: 10    LORazepam (ATIVAN) 1 MG tablet, Take 1 tablet (1 mg total) by mouth See admin instructions. Take 1 tab 45min prior to injection, repeat if needed, Disp: 2 tablet, Rfl: 0    LORazepam (ATIVAN) 1 MG tablet, Take 1 tablet (1 mg total) by mouth See admin instructions. take 1 tablet by mouth 45 MINUTES PRIOR TO INJECTION - REPEAT 15 MINUTES BEFORE INJECTION IF NEEDED, Disp: 2 tablet, Rfl: 0    meloxicam (MOBIC) 15 MG tablet, TAKE 1 TABLET(15 MG) BY MOUTH DAILY, Disp: 90 tablet, Rfl: 11    metFORMIN (GLUCOPHAGE) 1000 MG tablet, TK 1 T PO BID, Disp: , Rfl: 11    Naloxone HCl 2 MG/2ML solution prefilled syringe, , Disp: , Rfl:     NOVOLOG FLEXPEN 100 UNIT/ML solution pen-injector, , Disp: , Rfl:     OXcarbazepine (TRILEPTAL) 600 MG tablet, TK 1 T PO TID, Disp: , Rfl: 5    topiramate (TOPAMAX) 50 MG tablet, TK 1 T PO QAM AND 2 TS PO QHS, Disp: , Rfl: 3    traZODone (DESYREL) 100 MG tablet, TK 1 T PO QHS, Disp: , Rfl: 5    venlafaxine 75 MG tablet sustained-release 24 hour 24 hr tablet, , Disp: , Rfl:           Allergies   Allergen Reactions    Darvon [Propoxyphene] Rash    Wellbutrin [Bupropion] Rash         ROS:   No new bowel or bladder incontinence. No fever.   No saddle anesthesia.              Objective Objective: There were no vitals filed for this visit.     Body mass index is 45.73 kg/(m^2). , a BMI over 30 is considered obese and a BMI over 40 has been associated with a higher risk of surgical complications.     Constitutional: No acute distress. Well nourished. HEENT: Normocephalic. Respiratory:  No labored breathing. Cardiovascular:  No marked cyanosis. Skin:  No marked skin ulcers/lesions on bilateral upper or lower extremities. Psychiatric: Alert and oriented x3. Inspection: No gross deformity of bilateral upper or lower extremities. Musculoskeletal/Neurological:   Gait/balance:  - Normal. Able to walk on heels and toes. Thoracolumbar spine:  - No tenderness to palpation  - Full range of motion. Right lower extremity:  - No tenderness to palpation   - Full range of motion  - No pain with internal/external rotation of the hip  - Strength:  - 5 out of 5 to hip flexors  - 5 out of 5 to quads  - 5 out of 5 to TA  - 5 out of 5 to EHL  - 5 out of 5 to Gastroc/Soleus  - Negative straight leg raise  Left lower extremity:  - No tenderness to palpation   - Full range of motion  - No pain with internal/external rotation of the hip  - Strength:  - 5 out of 5 to hip flexors  - 5 out of 5 to quads  - 5 out of 5 to TA  - 5 out of 5 to EHL  - 5 out of 5 to Gastroc/Soleus  - Negative straight leg raise  Sensation:  - Intact to light touch  Reflexes:  - +2 Patellar tendon   - +2 Achilles tendon         Radiographs:       No imaging obtained       MRI lumbar spine shows previous anterior fusion from L5 through S1. She has spondylolisthesis of L4 over L5 with fluid facet joints at that level. She has moderate central stenosis and mild foraminal stenosis bilaterally     Assessment   Assessment:          ICD-10-CM   1. S/P lumbar fusion Z98.1   2. Spinal stenosis, lumbar region, with neurogenic claudication M48.062   3. Spondylolisthesis, lumbar region M43.16   4. Bilateral sciatica M54.31     M54.32   5. Osteoarthritis of spine with radiculopathy, lumbar region M47.26   6. Chronic low back pain, unspecified back pain laterality, with sciatica presence unspecified M54.5     G89.29               Plan   Plan:      At this point we discussed findings as well as different treatment options. She has tried conservative treatment involving anti-inflammatories, physical therapy, epidural injections without significant relief. We discussed surgical intervention which would involve L4-5 lateral and posterior fusion. We will set her up for LSO brace fitting and preoperative lab work and we will see her once surgery has been completed. She is told to call with questions or concerns.     I have discussed the procedure in detail with the patient and mentioned complications, including but not limited to: death, permanent disability, heart attack, stroke, lung injury or infection, blindness, ileus, bladder or bowel problems, ureter injury, bleeding, nerve injury (including numbness, pain and weakness), paralysis (which may be permanent), failure to heal, failure to fuse bone together in fusion procedures, failure to relief symptoms, failure to relief pain, increased pain, need for further surgeries, failure or breakage or hardware, malpositioning of hardware, need to fuse or operate on additional levels determined either during or after surgery, destabilization of the spine (which may require fusion or later surgery), infections (which may or may not require additional surgery), dural tears (tears of the sac holding in nerves and spinal fluid), meningitis, voice changes, vocal cord injury, hoarseness, blood clots, pulmonary embolus, Travis syndrome, recurrent disc herniation, diaphragm paralysis, and anesthetic complications. Comorbidities such as obesity, smoking, rheumatoid arthritis, chronic steroid use and diabetes increase these risks.  The patient understands and wants to proceed.             Orders Placed This Encounter  BMI >=25 PATIENT INSTRUCTIONS & EDUCATION      Return for Post-Op Check.

## 2018-03-06 ENCOUNTER — ANESTHESIA EVENT (OUTPATIENT)
Dept: SURGERY | Age: 48
DRG: 454 | End: 2018-03-06
Payer: MEDICARE

## 2018-03-06 ENCOUNTER — APPOINTMENT (OUTPATIENT)
Dept: MRI IMAGING | Age: 48
DRG: 454 | End: 2018-03-06
Attending: NURSE PRACTITIONER
Payer: MEDICARE

## 2018-03-06 ENCOUNTER — APPOINTMENT (OUTPATIENT)
Dept: GENERAL RADIOLOGY | Age: 48
DRG: 454 | End: 2018-03-06
Attending: ORTHOPAEDIC SURGERY
Payer: MEDICARE

## 2018-03-06 ENCOUNTER — ANESTHESIA (OUTPATIENT)
Dept: SURGERY | Age: 48
DRG: 454 | End: 2018-03-06
Payer: MEDICARE

## 2018-03-06 LAB
GLUCOSE BLD STRIP.AUTO-MCNC: 137 MG/DL (ref 65–100)
GLUCOSE BLD STRIP.AUTO-MCNC: 140 MG/DL (ref 65–100)
GLUCOSE BLD STRIP.AUTO-MCNC: 164 MG/DL (ref 65–100)
GLUCOSE BLD STRIP.AUTO-MCNC: 175 MG/DL (ref 65–100)
GLUCOSE BLD STRIP.AUTO-MCNC: 96 MG/DL (ref 65–100)
HGB BLD-MCNC: 11 G/DL (ref 11.5–16)
SERVICE CMNT-IMP: ABNORMAL
SERVICE CMNT-IMP: NORMAL

## 2018-03-06 PROCEDURE — 74011000250 HC RX REV CODE- 250: Performed by: ORTHOPAEDIC SURGERY

## 2018-03-06 PROCEDURE — 77030016570 HC BLNKT BAIR HGGR 3M -B: Performed by: ORTHOPAEDIC SURGERY

## 2018-03-06 PROCEDURE — 77030018846 HC SOL IRR STRL H20 ICUM -A: Performed by: ORTHOPAEDIC SURGERY

## 2018-03-06 PROCEDURE — 77030030943 HC ST DIL NIMS STIM MEDT -G: Performed by: ORTHOPAEDIC SURGERY

## 2018-03-06 PROCEDURE — G8979 MOBILITY GOAL STATUS: HCPCS

## 2018-03-06 PROCEDURE — G8988 SELF CARE GOAL STATUS: HCPCS

## 2018-03-06 PROCEDURE — 85018 HEMOGLOBIN: CPT | Performed by: ORTHOPAEDIC SURGERY

## 2018-03-06 PROCEDURE — 77030003666 HC NDL SPINAL BD -A: Performed by: ORTHOPAEDIC SURGERY

## 2018-03-06 PROCEDURE — 74011250636 HC RX REV CODE- 250/636: Performed by: ANESTHESIOLOGY

## 2018-03-06 PROCEDURE — 77030034475 HC MISC IMPL SPN: Performed by: ORTHOPAEDIC SURGERY

## 2018-03-06 PROCEDURE — 77030012961 HC IRR KT CYSTO/TUR ICUM -A: Performed by: ORTHOPAEDIC SURGERY

## 2018-03-06 PROCEDURE — 74011000250 HC RX REV CODE- 250

## 2018-03-06 PROCEDURE — 77030019908 HC STETH ESOPH SIMS -A: Performed by: NURSE ANESTHETIST, CERTIFIED REGISTERED

## 2018-03-06 PROCEDURE — 97116 GAIT TRAINING THERAPY: CPT

## 2018-03-06 PROCEDURE — G8987 SELF CARE CURRENT STATUS: HCPCS

## 2018-03-06 PROCEDURE — 77030033138 HC SUT PGA STRATFX J&J -B: Performed by: ORTHOPAEDIC SURGERY

## 2018-03-06 PROCEDURE — 01NB0ZZ RELEASE LUMBAR NERVE, OPEN APPROACH: ICD-10-PCS | Performed by: ORTHOPAEDIC SURGERY

## 2018-03-06 PROCEDURE — 82962 GLUCOSE BLOOD TEST: CPT

## 2018-03-06 PROCEDURE — 77010033678 HC OXYGEN DAILY

## 2018-03-06 PROCEDURE — 77030003029 HC SUT VCRL J&J -B: Performed by: ORTHOPAEDIC SURGERY

## 2018-03-06 PROCEDURE — 74011250636 HC RX REV CODE- 250/636

## 2018-03-06 PROCEDURE — 77030029099 HC BN WAX SSPC -A: Performed by: ORTHOPAEDIC SURGERY

## 2018-03-06 PROCEDURE — 77030011266 HC ELECTRD BLD INSL COVD -A: Performed by: ORTHOPAEDIC SURGERY

## 2018-03-06 PROCEDURE — 77030013079 HC BLNKT BAIR HGGR 3M -A: Performed by: NURSE ANESTHETIST, CERTIFIED REGISTERED

## 2018-03-06 PROCEDURE — 72148 MRI LUMBAR SPINE W/O DYE: CPT

## 2018-03-06 PROCEDURE — 65270000029 HC RM PRIVATE

## 2018-03-06 PROCEDURE — 74011000272 HC RX REV CODE- 272: Performed by: ORTHOPAEDIC SURGERY

## 2018-03-06 PROCEDURE — 77030018836 HC SOL IRR NACL ICUM -A: Performed by: ORTHOPAEDIC SURGERY

## 2018-03-06 PROCEDURE — G8978 MOBILITY CURRENT STATUS: HCPCS

## 2018-03-06 PROCEDURE — 77030032490 HC SLV COMPR SCD KNE COVD -B: Performed by: ORTHOPAEDIC SURGERY

## 2018-03-06 PROCEDURE — 74011636637 HC RX REV CODE- 636/637: Performed by: ORTHOPAEDIC SURGERY

## 2018-03-06 PROCEDURE — 97530 THERAPEUTIC ACTIVITIES: CPT

## 2018-03-06 PROCEDURE — 77030008684 HC TU ET CUF COVD -B: Performed by: NURSE ANESTHETIST, CERTIFIED REGISTERED

## 2018-03-06 PROCEDURE — 77030029914 HC PMP F NGP WND DRSG PICO S&N -C: Performed by: ORTHOPAEDIC SURGERY

## 2018-03-06 PROCEDURE — 77030004391 HC BUR FLUT MEDT -C: Performed by: ORTHOPAEDIC SURGERY

## 2018-03-06 PROCEDURE — 77030034479 HC ADH SKN CLSR PRINEO J&J -B: Performed by: ORTHOPAEDIC SURGERY

## 2018-03-06 PROCEDURE — 74011250637 HC RX REV CODE- 250/637: Performed by: ORTHOPAEDIC SURGERY

## 2018-03-06 PROCEDURE — 36415 COLL VENOUS BLD VENIPUNCTURE: CPT | Performed by: ORTHOPAEDIC SURGERY

## 2018-03-06 PROCEDURE — 74011250636 HC RX REV CODE- 250/636: Performed by: ORTHOPAEDIC SURGERY

## 2018-03-06 PROCEDURE — 97161 PT EVAL LOW COMPLEX 20 MIN: CPT

## 2018-03-06 PROCEDURE — 77030028539 HC KNF DSCTMY MTRX BYN MEDT -D: Performed by: ORTHOPAEDIC SURGERY

## 2018-03-06 PROCEDURE — 77030014647 HC SEAL FBRN TISSL BAXT -D: Performed by: ORTHOPAEDIC SURGERY

## 2018-03-06 PROCEDURE — 97165 OT EVAL LOW COMPLEX 30 MIN: CPT

## 2018-03-06 PROCEDURE — 76010000161 HC OR TIME 1 TO 1.5 HR INTENSV-TIER 1: Performed by: ORTHOPAEDIC SURGERY

## 2018-03-06 PROCEDURE — 72100 X-RAY EXAM L-S SPINE 2/3 VWS: CPT

## 2018-03-06 PROCEDURE — 77030022704 HC SUT VLOC COVD -B: Performed by: ORTHOPAEDIC SURGERY

## 2018-03-06 PROCEDURE — 76060000033 HC ANESTHESIA 1 TO 1.5 HR: Performed by: ORTHOPAEDIC SURGERY

## 2018-03-06 PROCEDURE — 77030014650 HC SEAL MTRX FLOSEL BAXT -C: Performed by: ORTHOPAEDIC SURGERY

## 2018-03-06 PROCEDURE — 76210000016 HC OR PH I REC 1 TO 1.5 HR: Performed by: ORTHOPAEDIC SURGERY

## 2018-03-06 PROCEDURE — 77030003028 HC SUT VCRL J&J -A: Performed by: ORTHOPAEDIC SURGERY

## 2018-03-06 RX ORDER — DIPHENHYDRAMINE HYDROCHLORIDE 50 MG/ML
12.5 INJECTION, SOLUTION INTRAMUSCULAR; INTRAVENOUS AS NEEDED
Status: ACTIVE | OUTPATIENT
Start: 2018-03-06 | End: 2018-03-06

## 2018-03-06 RX ORDER — DEXAMETHASONE SODIUM PHOSPHATE 4 MG/ML
INJECTION, SOLUTION INTRA-ARTICULAR; INTRALESIONAL; INTRAMUSCULAR; INTRAVENOUS; SOFT TISSUE AS NEEDED
Status: DISCONTINUED | OUTPATIENT
Start: 2018-03-06 | End: 2018-03-06 | Stop reason: HOSPADM

## 2018-03-06 RX ORDER — PROPOFOL 10 MG/ML
INJECTION, EMULSION INTRAVENOUS AS NEEDED
Status: DISCONTINUED | OUTPATIENT
Start: 2018-03-06 | End: 2018-03-06 | Stop reason: HOSPADM

## 2018-03-06 RX ORDER — SODIUM CHLORIDE, SODIUM LACTATE, POTASSIUM CHLORIDE, CALCIUM CHLORIDE 600; 310; 30; 20 MG/100ML; MG/100ML; MG/100ML; MG/100ML
100 INJECTION, SOLUTION INTRAVENOUS CONTINUOUS
Status: DISCONTINUED | OUTPATIENT
Start: 2018-03-06 | End: 2018-03-06 | Stop reason: HOSPADM

## 2018-03-06 RX ORDER — HYDROMORPHONE HYDROCHLORIDE 1 MG/ML
0.5 INJECTION, SOLUTION INTRAMUSCULAR; INTRAVENOUS; SUBCUTANEOUS
Status: DISCONTINUED | OUTPATIENT
Start: 2018-03-06 | End: 2018-03-07 | Stop reason: HOSPADM

## 2018-03-06 RX ORDER — NEOSTIGMINE METHYLSULFATE 1 MG/ML
INJECTION INTRAVENOUS AS NEEDED
Status: DISCONTINUED | OUTPATIENT
Start: 2018-03-06 | End: 2018-03-06 | Stop reason: HOSPADM

## 2018-03-06 RX ORDER — FENTANYL CITRATE 50 UG/ML
50 INJECTION, SOLUTION INTRAMUSCULAR; INTRAVENOUS AS NEEDED
Status: DISCONTINUED | OUTPATIENT
Start: 2018-03-06 | End: 2018-03-06 | Stop reason: HOSPADM

## 2018-03-06 RX ORDER — SODIUM CHLORIDE 9 MG/ML
INJECTION, SOLUTION INTRAVENOUS
Status: DISCONTINUED | OUTPATIENT
Start: 2018-03-06 | End: 2018-03-06 | Stop reason: HOSPADM

## 2018-03-06 RX ORDER — ONDANSETRON 2 MG/ML
4 INJECTION INTRAMUSCULAR; INTRAVENOUS AS NEEDED
Status: DISCONTINUED | OUTPATIENT
Start: 2018-03-06 | End: 2018-03-07 | Stop reason: HOSPADM

## 2018-03-06 RX ORDER — LIDOCAINE HYDROCHLORIDE 20 MG/ML
INJECTION, SOLUTION EPIDURAL; INFILTRATION; INTRACAUDAL; PERINEURAL AS NEEDED
Status: DISCONTINUED | OUTPATIENT
Start: 2018-03-06 | End: 2018-03-06 | Stop reason: HOSPADM

## 2018-03-06 RX ORDER — LIDOCAINE HYDROCHLORIDE 10 MG/ML
0.1 INJECTION, SOLUTION EPIDURAL; INFILTRATION; INTRACAUDAL; PERINEURAL AS NEEDED
Status: DISCONTINUED | OUTPATIENT
Start: 2018-03-06 | End: 2018-03-06 | Stop reason: HOSPADM

## 2018-03-06 RX ORDER — SUCCINYLCHOLINE CHLORIDE 20 MG/ML
INJECTION INTRAMUSCULAR; INTRAVENOUS AS NEEDED
Status: DISCONTINUED | OUTPATIENT
Start: 2018-03-06 | End: 2018-03-06 | Stop reason: HOSPADM

## 2018-03-06 RX ORDER — GLYCOPYRROLATE 0.2 MG/ML
INJECTION INTRAMUSCULAR; INTRAVENOUS AS NEEDED
Status: DISCONTINUED | OUTPATIENT
Start: 2018-03-06 | End: 2018-03-06 | Stop reason: HOSPADM

## 2018-03-06 RX ORDER — HYDROCODONE BITARTRATE AND ACETAMINOPHEN 5; 325 MG/1; MG/1
1 TABLET ORAL AS NEEDED
Status: DISCONTINUED | OUTPATIENT
Start: 2018-03-06 | End: 2018-03-07 | Stop reason: HOSPADM

## 2018-03-06 RX ORDER — MIDAZOLAM HYDROCHLORIDE 1 MG/ML
1 INJECTION, SOLUTION INTRAMUSCULAR; INTRAVENOUS AS NEEDED
Status: DISCONTINUED | OUTPATIENT
Start: 2018-03-06 | End: 2018-03-06 | Stop reason: HOSPADM

## 2018-03-06 RX ORDER — FENTANYL CITRATE 50 UG/ML
INJECTION, SOLUTION INTRAMUSCULAR; INTRAVENOUS AS NEEDED
Status: DISCONTINUED | OUTPATIENT
Start: 2018-03-06 | End: 2018-03-06 | Stop reason: HOSPADM

## 2018-03-06 RX ORDER — MIDAZOLAM HYDROCHLORIDE 1 MG/ML
0.5 INJECTION, SOLUTION INTRAMUSCULAR; INTRAVENOUS
Status: DISCONTINUED | OUTPATIENT
Start: 2018-03-06 | End: 2018-03-07 | Stop reason: HOSPADM

## 2018-03-06 RX ORDER — FENTANYL CITRATE 50 UG/ML
25 INJECTION, SOLUTION INTRAMUSCULAR; INTRAVENOUS
Status: DISCONTINUED | OUTPATIENT
Start: 2018-03-06 | End: 2018-03-07 | Stop reason: HOSPADM

## 2018-03-06 RX ORDER — SODIUM CHLORIDE 9 MG/ML
125 INJECTION, SOLUTION INTRAVENOUS CONTINUOUS
Status: DISPENSED | OUTPATIENT
Start: 2018-03-07 | End: 2018-03-07

## 2018-03-06 RX ORDER — ROCURONIUM BROMIDE 10 MG/ML
INJECTION, SOLUTION INTRAVENOUS AS NEEDED
Status: DISCONTINUED | OUTPATIENT
Start: 2018-03-06 | End: 2018-03-06 | Stop reason: HOSPADM

## 2018-03-06 RX ORDER — ONDANSETRON 2 MG/ML
INJECTION INTRAMUSCULAR; INTRAVENOUS AS NEEDED
Status: DISCONTINUED | OUTPATIENT
Start: 2018-03-06 | End: 2018-03-06 | Stop reason: HOSPADM

## 2018-03-06 RX ORDER — MIDAZOLAM HYDROCHLORIDE 1 MG/ML
INJECTION, SOLUTION INTRAMUSCULAR; INTRAVENOUS AS NEEDED
Status: DISCONTINUED | OUTPATIENT
Start: 2018-03-06 | End: 2018-03-06 | Stop reason: HOSPADM

## 2018-03-06 RX ADMIN — DOCUSATE SODIUM AND SENNOSIDES 1 TABLET: 8.6; 5 TABLET, FILM COATED ORAL at 09:56

## 2018-03-06 RX ADMIN — CLONAZEPAM 1 MG: 1 TABLET ORAL at 09:55

## 2018-03-06 RX ADMIN — ATORVASTATIN CALCIUM 40 MG: 40 TABLET, FILM COATED ORAL at 09:55

## 2018-03-06 RX ADMIN — DEXAMETHASONE SODIUM PHOSPHATE 10 MG: 4 INJECTION, SOLUTION INTRAMUSCULAR; INTRAVENOUS at 15:00

## 2018-03-06 RX ADMIN — OXCARBAZEPINE 300 MG: 150 TABLET ORAL at 09:55

## 2018-03-06 RX ADMIN — FENTANYL CITRATE 50 MCG: 50 INJECTION, SOLUTION INTRAMUSCULAR; INTRAVENOUS at 22:20

## 2018-03-06 RX ADMIN — Medication 10 ML: at 15:50

## 2018-03-06 RX ADMIN — OXCARBAZEPINE 300 MG: 150 TABLET ORAL at 18:53

## 2018-03-06 RX ADMIN — HYDROMORPHONE HYDROCHLORIDE 1 MG: 2 INJECTION, SOLUTION INTRAMUSCULAR; INTRAVENOUS; SUBCUTANEOUS at 18:47

## 2018-03-06 RX ADMIN — PROPOFOL 50 MG: 10 INJECTION, EMULSION INTRAVENOUS at 23:17

## 2018-03-06 RX ADMIN — FENTANYL CITRATE 50 MCG: 50 INJECTION, SOLUTION INTRAMUSCULAR; INTRAVENOUS at 23:25

## 2018-03-06 RX ADMIN — CEFAZOLIN 3 G: 1 INJECTION, POWDER, FOR SOLUTION INTRAMUSCULAR; INTRAVENOUS; PARENTERAL at 22:38

## 2018-03-06 RX ADMIN — SUCCINYLCHOLINE CHLORIDE 140 MG: 20 INJECTION INTRAMUSCULAR; INTRAVENOUS at 22:26

## 2018-03-06 RX ADMIN — MIDAZOLAM HYDROCHLORIDE 2 MG: 1 INJECTION, SOLUTION INTRAMUSCULAR; INTRAVENOUS at 22:17

## 2018-03-06 RX ADMIN — LURASIDONE HYDROCHLORIDE 80 MG: 40 TABLET, FILM COATED ORAL at 10:08

## 2018-03-06 RX ADMIN — CYCLOBENZAPRINE HYDROCHLORIDE 10 MG: 10 TABLET, FILM COATED ORAL at 10:08

## 2018-03-06 RX ADMIN — HYDROMORPHONE HYDROCHLORIDE 1 MG: 2 INJECTION, SOLUTION INTRAMUSCULAR; INTRAVENOUS; SUBCUTANEOUS at 08:30

## 2018-03-06 RX ADMIN — FENTANYL CITRATE 25 MCG: 50 INJECTION, SOLUTION INTRAMUSCULAR; INTRAVENOUS at 23:54

## 2018-03-06 RX ADMIN — DEXAMETHASONE SODIUM PHOSPHATE 4 MG: 4 INJECTION, SOLUTION INTRA-ARTICULAR; INTRALESIONAL; INTRAMUSCULAR; INTRAVENOUS; SOFT TISSUE at 22:56

## 2018-03-06 RX ADMIN — FENTANYL CITRATE 50 MCG: 50 INJECTION, SOLUTION INTRAMUSCULAR; INTRAVENOUS at 23:30

## 2018-03-06 RX ADMIN — LAMOTRIGINE 50 MG: 25 TABLET ORAL at 09:54

## 2018-03-06 RX ADMIN — Medication 10 ML: at 05:24

## 2018-03-06 RX ADMIN — POLYETHYLENE GLYCOL 3350 17 G: 17 POWDER, FOR SOLUTION ORAL at 08:29

## 2018-03-06 RX ADMIN — HYDROMORPHONE HYDROCHLORIDE 1 MG: 2 INJECTION, SOLUTION INTRAMUSCULAR; INTRAVENOUS; SUBCUTANEOUS at 15:43

## 2018-03-06 RX ADMIN — OXYCODONE HYDROCHLORIDE 15 MG: 5 TABLET ORAL at 01:15

## 2018-03-06 RX ADMIN — PROPOFOL 20 MG: 10 INJECTION, EMULSION INTRAVENOUS at 23:12

## 2018-03-06 RX ADMIN — ROCURONIUM BROMIDE 30 MG: 10 INJECTION, SOLUTION INTRAVENOUS at 22:45

## 2018-03-06 RX ADMIN — METFORMIN HYDROCHLORIDE 1000 MG: 500 TABLET, FILM COATED ORAL at 09:54

## 2018-03-06 RX ADMIN — NEOSTIGMINE METHYLSULFATE 3 MG: 1 INJECTION INTRAVENOUS at 23:20

## 2018-03-06 RX ADMIN — ACETAMINOPHEN 1000 MG: 500 TABLET ORAL at 12:28

## 2018-03-06 RX ADMIN — OXYCODONE HYDROCHLORIDE 15 MG: 5 TABLET ORAL at 05:23

## 2018-03-06 RX ADMIN — SODIUM CHLORIDE: 9 INJECTION, SOLUTION INTRAVENOUS at 22:17

## 2018-03-06 RX ADMIN — ACETAMINOPHEN 1000 MG: 500 TABLET ORAL at 01:06

## 2018-03-06 RX ADMIN — BACLOFEN 10 MG: 10 TABLET ORAL at 18:53

## 2018-03-06 RX ADMIN — CLONAZEPAM 0.5 MG: 0.5 TABLET ORAL at 12:28

## 2018-03-06 RX ADMIN — GABAPENTIN 300 MG: 300 CAPSULE ORAL at 09:55

## 2018-03-06 RX ADMIN — THERA TABS 1 TABLET: TAB at 09:56

## 2018-03-06 RX ADMIN — FAMOTIDINE 20 MG: 20 TABLET, FILM COATED ORAL at 09:56

## 2018-03-06 RX ADMIN — INSULIN LISPRO 4 UNITS: 100 INJECTION, SOLUTION INTRAVENOUS; SUBCUTANEOUS at 12:29

## 2018-03-06 RX ADMIN — ONDANSETRON 4 MG: 2 INJECTION INTRAMUSCULAR; INTRAVENOUS at 23:05

## 2018-03-06 RX ADMIN — INSULIN LISPRO 2 UNITS: 100 INJECTION, SOLUTION INTRAVENOUS; SUBCUTANEOUS at 09:57

## 2018-03-06 RX ADMIN — FENTANYL CITRATE 25 MCG: 50 INJECTION, SOLUTION INTRAMUSCULAR; INTRAVENOUS at 23:58

## 2018-03-06 RX ADMIN — INSULIN LISPRO 8 UNITS: 100 INJECTION, SOLUTION INTRAVENOUS; SUBCUTANEOUS at 09:58

## 2018-03-06 RX ADMIN — PROPOFOL 50 MG: 10 INJECTION, EMULSION INTRAVENOUS at 23:20

## 2018-03-06 RX ADMIN — ACETAMINOPHEN 1000 MG: 500 TABLET ORAL at 05:24

## 2018-03-06 RX ADMIN — CLONAZEPAM 1 MG: 1 TABLET ORAL at 18:51

## 2018-03-06 RX ADMIN — CEFAZOLIN 3 G: 1 INJECTION, POWDER, FOR SOLUTION INTRAMUSCULAR; INTRAVENOUS; PARENTERAL at 06:26

## 2018-03-06 RX ADMIN — PROPOFOL 180 MG: 10 INJECTION, EMULSION INTRAVENOUS at 22:26

## 2018-03-06 RX ADMIN — HYDROMORPHONE HYDROCHLORIDE 1 MG: 2 INJECTION, SOLUTION INTRAMUSCULAR; INTRAVENOUS; SUBCUTANEOUS at 11:52

## 2018-03-06 RX ADMIN — CEFAZOLIN 3 G: 1 INJECTION, POWDER, FOR SOLUTION INTRAMUSCULAR; INTRAVENOUS; PARENTERAL at 12:37

## 2018-03-06 RX ADMIN — GLYCOPYRROLATE 0.4 MG: 0.2 INJECTION INTRAMUSCULAR; INTRAVENOUS at 23:20

## 2018-03-06 RX ADMIN — LIDOCAINE HYDROCHLORIDE 60 MG: 20 INJECTION, SOLUTION EPIDURAL; INFILTRATION; INTRACAUDAL; PERINEURAL at 22:26

## 2018-03-06 RX ADMIN — FENTANYL CITRATE 50 MCG: 50 INJECTION, SOLUTION INTRAMUSCULAR; INTRAVENOUS at 22:47

## 2018-03-06 RX ADMIN — GABAPENTIN 300 MG: 300 CAPSULE ORAL at 18:51

## 2018-03-06 NOTE — PROGRESS NOTES
PT note:    Chart reviewed and spoke with nursing. Patient currently off the floor for MRI. Will follow up for PT treatment session.      aNncy Real, PT, DPT

## 2018-03-06 NOTE — PROGRESS NOTES
ORTHO POST OP SPINE PROGRESS NOTE    2018  Admit Date: 3/5/2018  Admit Diagnosis: LUMBAGO, RADICULOPATHY, STENOSIS, SPONDELOLITHISIS  Spondylisthesis  Procedure: Procedure(s):  L4-5 LATERAL FUSION WITH BONE MARROW ASPIRATION FROM LEFT  ILIAC CREST   L4-L5  POSTERIOR FUSION  Post Op day: 1 Day Post-Op    Subjective:     Malachi Rossi is a patient who has complaints of LBP and LLE pain s/p L4-5 post and lat fusion. no n/v. has been able to void. Review of Systems: Pertinent items are noted in HPI. Objective:     PT/OT:   Distance Ambulated:           Time Ambulated (min):        Ambulation Response: Activity Response: Tolerated well  Assistive Device:              Assistive Device: Fall prevention device    Vital Signs:    Blood pressure 141/60, pulse 88, temperature 98.1 °F (36.7 °C), resp. rate 18, height 5' 2\" (1.575 m), weight 121.1 kg (266 lb 15.6 oz), last menstrual period 2018, SpO2 97 %. Temp (24hrs), Av.4 °F (36.9 °C), Min:98.1 °F (36.7 °C), Max:99.2 °F (37.3 °C)          1901 -  0700  In: 2000 [I.V.:2000]  Out: 9948 [Urine:1375; Drains:30]    LAB:    Recent Labs      18   0414   HGB  11.0*       Wound/Drain Assessment:  Drain:      Dressing:     Physical Exam:  Neurological: no deficit  Incision clean, dry, and intact and prineo on l lateral and marleny on posterior incision. holding suction  5/5 BLE    Assessment:      Patient Active Problem List   Diagnosis Code    Bipolar disorder, mixed ([facility] [address] F31.60    Hyponatremia E87.1    Spondylisthesis M43.10    S/P lumbar spinal fusion Z98.1       Plan:     Continue PT/OT/Rehab  Discontinue: IV and drain (lumbar)  Consult: PT  and OT    Discharge To: home.  ? if [place] necessary pending progress w/ PT OT       Signed By: GENE Michele

## 2018-03-06 NOTE — PERIOP NOTES
1928-Handoff Report from Operating Room to PACU    Report received from Dena Cutler56 and Mely Carrasco CRNA regarding Clinton Young. Surgeon(s):  MD Kristen Irwin MD  And Procedure(s) (LRB):  L4-5 LATERAL FUSION WITH BONE MARROW ASPIRATION FROM LEFT  ILIAC CREST  (N/A)  L4-L5  POSTERIOR FUSION (N/A)  confirmed   with allergies, drains and dressings discussed. Anesthesia type, drugs, patient history, complications, estimated blood loss, vital signs, intake and output, and last pain medication, lines, reversal medications and temperature were reviewed. 2035- Family updated. 2045- TRANSFER - OUT REPORT:    Verbal report given to [facility] RN(name) on Clinton Young  being transferred to ortho(unit) for routine post - op       Report consisted of patients Situation, Background, Assessment and   Recommendations(SBAR). Information from the following report(s) SBAR, Kardex, OR Summary, Procedure Summary, Intake/Output, MAR and Recent Results was reviewed with the receiving nurse. Opportunity for questions and clarification was provided.       Patient transported with:   O2 @ 3 liters  Registered Nursex2

## 2018-03-06 NOTE — PROGRESS NOTES
Chart reviewed and spoke with nursing. Patient currently off the floor for MRI.  Will follow up for OT treatment session

## 2018-03-06 NOTE — ANESTHESIA POSTPROCEDURE EVALUATION
Post-Anesthesia Evaluation and Assessment    Patient: Joanna Lennox MRN: [id number]  SSN: [id number]    YOB: 1970  Age: 41 y.o. Sex: female       Cardiovascular Function/Vital Signs  Visit Vitals    /66    Pulse (!) 103    Temp 36.8 °C (98.2 °F)    Resp 16    Ht 5' 2\" (1.575 m)    Wt 121.1 kg (266 lb 15.6 oz)    SpO2 96%    BMI 48.83 kg/m2       Patient is status post general anesthesia for Procedure(s):  L4-5 LATERAL FUSION WITH BONE MARROW ASPIRATION FROM LEFT  ILIAC CREST   L4-L5  POSTERIOR FUSION. Nausea/Vomiting: None    Postoperative hydration reviewed and adequate. Pain:  Pain Scale 1: Numeric (0 - 10) (03/05/18 2011)  Pain Intensity 1: 7 (03/05/18 2011)   Managed    Neurological Status:   Neuro (WDL): Exceptions to WDL (Denies numbness or tingling to extremeties) (03/05/18 1234)  Neuro  Neurologic State: Alert; Appropriate for age (03/05/18 1234)  Orientation Level: Appropriate for age;Oriented X4 (03/05/18 1234)  Cognition: Appropriate decision making; Appropriate for age attention/concentration; Appropriate safety awareness; Follows commands (03/05/18 1234)  Speech: Appropriate for age;Clear (03/05/18 1234)  LUE Motor Response: Purposeful;Spontaneous  (03/05/18 1234)  LLE Motor Response: Purposeful;Spontaneous ; Other(comment) (Pain with movement) (03/05/18 1234)  RUE Motor Response: Purposeful;Spontaneous  (03/05/18 1234)  RLE Motor Response: Other(comment); Purposeful;Spontaneous  (Pain with movement) (03/05/18 1234)   At baseline    Mental Status and Level of Consciousness: Arousable    Pulmonary Status:   O2 Device: Nasal cannula (03/05/18 2000)   Adequate oxygenation and airway patent    Complications related to anesthesia: None    Post-anesthesia assessment completed.  No concerns    Signed By: Cristobal Cooper MD     March 5, 2018

## 2018-03-06 NOTE — PROGRESS NOTES
Ortho NP Note:    Pt seen this morning after finishing AM PT and OT session. Pain is limiting factor. Seems a bit groggy as well. States primary pain is shooting pain down right leg. PO medication is helping back / incisional pain. Noted hyponatremia pre-operatively has resolved - 132 yesterday  Seemed to be medication related (trileptal) and dose adjusted last admission. Currently receiving Neurontin for neuropathic pain 300 BID. Will discuss with Dr. Loc Cintron and possibly increase frequency. Home when clears PT - needs at least PM session and stair training. Fara Ambrocio NP          Addendum @ 1244:    Discussed patient's pain with Dr. Loc Cintron. He is concerned that cage inserted from left may have caused impingement of right nerve root and severe leg pain. Will make her NPO now and check STAT MRI of lumbar spine. Pt and nurse informed.     Fara Ambrocio NP

## 2018-03-06 NOTE — PROGRESS NOTES
Problem: Mobility Impaired (Adult and Pediatric)  Goal: *Acute Goals and Plan of Care (Insert Text)  Physical Therapy Goals  Initiated 3/6/2018    1. Patient will move from supine to sit and sit to supine , scoot up and down and roll side to side in bed with independence within 4 days. 2. Patient will perform sit to stand with modified independence within 4 days. 3. Patient will ambulate with modified independence for 150 feet with the least restrictive device within 4 days. 4. Patient will ascend/descend 4 stairs with one sided handrail(s) with modified independence within 4 days. 5. Patient will verbalize and demonstrate understanding of spinal precautions (No bending, lifting greater than 5 lbs, or twisting; log-roll technique; frequent repositioning as instructed) within 4 days. physical Therapy EVALUATION  Patient: Grant Sanchez (5 y.o. female)  Date: 3/6/2018  Primary Diagnosis: LUMBAGO, RADICULOPATHY, STENOSIS, SPONDELOLITHISIS  Spondylisthesis  LUMBAR  Procedure(s) (LRB):  L4-5 LATERAL FUSION WITH BONE MARROW ASPIRATION FROM LEFT  ILIAC CREST  (N/A)  L4-L5  POSTERIOR FUSION (N/A) 1 Day Post-Op   Precautions: brace  Back    ASSESSMENT :  Based on the objective data described below, the patient presents with decreased strength, decreased functional mobility, increased RLE pain with mobility, and unsteady gait with RW s/p L4-5 lateral and posterior fusion POD 1. PTA patient lives with her  and daughter. She is independent with all aspects of functional mobility and ADLS although does not work. She reports she drives and performs light duty household cleaning. Currently, patient received resting in bed, agreeable to PT. Patient required CGA for bed mobility via log roll. Patient educated on back precautions and able to adhere through PT session. Patient donned back brace with min-mod A. Patient required CGA for sit <> stand with RW. Patient ambulated 75 feet with CGA and RW.  Patient with very antalgic gait and step through gait pattern. Patient unable to do stairs in AM due to pain so will assess in PM session. Patient left sitting in the chair although difficult to get comfortable. Encouraged patient to change position every hour for comfort of back. Patient will benefit from [facility] PT and RW at discharge. Patient will benefit from skilled intervention to address the above impairments. Patients rehabilitation potential is considered to be Good  Factors which may influence rehabilitation potential include:   []         None noted  []         Mental ability/status  []         Medical condition  []         Home/family situation and support systems  []         Safety awareness  [x]         Pain tolerance/management  []         Other:      PLAN :  Recommendations and Planned Interventions:  [x]           Bed Mobility Training             []    Neuromuscular Re-Education  [x]           Transfer Training                   []    Orthotic/Prosthetic Training  [x]           Gait Training                         []    Modalities  [x]           Therapeutic Exercises           []    Edema Management/Control  [x]           Therapeutic Activities            [x]    Patient and Family Training/Education  []           Other (comment):    Frequency/Duration: Patient will be followed by physical therapy  twice daily to address goals. Discharge Recommendations: Home Health  Further Equipment Recommendations for Discharge: rolling walker     SUBJECTIVE:   Patient stated My right leg is killing me.     OBJECTIVE DATA SUMMARY:   HISTORY:    Past Medical History:   Diagnosis Date    Bipolar affective disorder ([address]     Dissociative identity disorder     Hyperlipidemia     Hypertension     Morbid obesity ([address]     NIDDM (non-insulin dependent diabetes mellitus)     Psychiatric disorder     Depression/Anxiety    PTSD (post-traumatic stress disorder)      Past Surgical History:   Procedure Laterality Date    DELIVERY       HX BACK SURGERY      HX BREAST BIOPSY Left     u/s benign biopsy 3 years ago    HX CHOLECYSTECTOMY      HX TONSILLECTOMY      HX TUBAL LIGATION      OH REPAIR ACHILLES TENDON,PRIMARY       Prior Level of Function/Home Situation: patient lives with her  and daughter. She is independent with all aspects of functional mobility and ADLS although does not work. She reports she drives and performs light duty household cleaning. Personal factors and/or comorbidities impacting plan of care:     Home Situation  Home Environment: Private residence  # Steps to Enter: 2  Rails to Enter: No  One/Two Story Residence: One story  Living Alone: No  Support Systems: Child(carolyn), Spouse/Significant Other/Partner  Patient Expects to be Discharged to[de-identified] Private residence  Current DME Used/Available at Home: Glucometer, Cane, straight  Tub or Shower Type: Tub/Shower combination    EXAMINATION/PRESENTATION/DECISION MAKING:   Critical Behavior:  Neurologic State: Alert, Appropriate for age  Orientation Level: Oriented X4  Cognition: Follows commands     Hearing: Auditory  Auditory Impairment: None  Hearing Aids/Status: Does not own  Skin:    Edema:   Range Of Motion:  AROM: Generally decreased, functional           PROM: Within functional limits           Strength:    Strength: Generally decreased, functional                    Tone & Sensation:   Tone: Normal              Sensation: Intact               Coordination:  Coordination: Within functional limits  Vision:      Functional Mobility:  Bed Mobility:  Rolling: Supervision  Supine to Sit: Supervision     Scooting: Supervision  Transfers:  Sit to Stand: Contact guard assistance  Stand to Sit: Contact guard assistance        Bed to Chair: Contact guard assistance              Balance:   Sitting: Intact; Without support  Standing: Impaired; With support  Standing - Static: Good  Standing - Dynamic : Fair  Ambulation/Gait Training:  Distance (ft): 75 Feet (ft)  Assistive Device: Gait belt;Walker, rolling  Ambulation - Level of Assistance: Contact guard assistance     Gait Description (WDL): Exceptions to WDL  Gait Abnormalities: Antalgic;Decreased step clearance        Base of Support: Widened     Speed/Stacy: Pace decreased (<100 feet/min)  Step Length: Right shortened;Left shortened                     Stairs: Therapeutic Exercises:       Functional Measure:  Barthel Index:    Bathin  Bladder: 10  Bowels: 10  Groomin  Dressin  Feeding: 10  Mobility: 10  Stairs: 5  Toilet Use: 10  Transfer (Bed to Chair and Back): 10  Total: 75       Barthel and G-code impairment scale:  Percentage of impairment CH  0% CI  1-19% CJ  20-39% CK  40-59% CL  60-79% CM  80-99% CN  100%   Barthel Score 0-100 100 99-80 79-60 59-40 20-39 1-19   0   Barthel Score 0-20 20 17-19 13-16 9-12 5-8 1-4 0      The Barthel ADL Index: Guidelines  1. The index should be used as a record of what a patient does, not as a record of what a patient could do. 2. The main aim is to establish degree of independence from any help, physical or verbal, however minor and for whatever reason. 3. The need for supervision renders the patient not independent. 4. A patient's performance should be established using the best available evidence. Asking the patient, friends/relatives and nurses are the usual sources, but direct observation and common sense are also important. However direct testing is not needed. 5. Usually the patient's performance over the preceding 24-48 hours is important, but occasionally longer periods will be relevant. 6. Middle categories imply that the patient supplies over 50 per cent of the effort. 7. Use of aids to be independent is allowed. Aniyah Harvey., Barthel, D.W. (3243). Functional evaluation: the Barthel Index. [facility] (14)2. ANDRAE AvitiaF, Stefany Haley., Jeannette Klein., Shaun, [address] [place] [address] ().  Measuring the change indisability after inpatient rehabilitation; comparison of the responsiveness of the Barthel Index and Functional [place] Measure. Journal of Neurology, Neurosurgery, and Psychiatry, 66(4), 400-035. CHRISTOFER Smith, LURDES Muniz, & Shanita Reynolds M.A. (2004.) Assessment of post-stroke quality of life in cost-effectiveness studies: The usefulness of the Barthel Index and the EuroQoL-5D. Quality of Life Research, 13, 744-04         G codes: In compliance with CMSs Claims Based Outcome Reporting, the following G-code set was chosen for this patient based on their primary functional limitation being treated: The outcome measure chosen to determine the severity of the functional limitation was the Barthel with a score of 75/100 which was correlated with the impairment scale. ? Mobility - Walking and Moving Around:     - CURRENT STATUS: CJ - 20%-39% impaired, limited or restricted    - GOAL STATUS: CI - 1%-19% impaired, limited or restricted    - D/C STATUS:  ---------------To be determined---------------      Physical Therapy Evaluation Charge Determination   History Examination Presentation Decision-Making   MEDIUM  Complexity : 1-2 comorbidities / personal factors will impact the outcome/ POC  MEDIUM Complexity : 3 Standardized tests and measures addressing body structure, function, activity limitation and / or participation in recreation  MEDIUM Complexity : Evolving with changing characteristics  Other outcome measures Barthel  LOW       Based on the above components, the patient evaluation is determined to be of the following complexity level: LOW     Pain:  Pain Scale 1: Numeric (0 - 10)  Pain Intensity 1: 10  Pain Location 1: Back  Pain Orientation 1: Lower  Pain Description 1: Aching  Pain Intervention(s) 1: Medication (see MAR); Rest;Repositioned  Activity Tolerance:   Good, increased pain. Please refer to the flowsheet for vital signs taken during this treatment.   After treatment:   [x] Patient left in no apparent distress sitting up in chair  []         Patient left in no apparent distress in bed  [x]         Call bell left within reach  [x]         Nursing notified  [x]         Caregiver present  []         Bed alarm activated    COMMUNICATION/EDUCATION:   The patients plan of care was discussed with: Occupational Therapist, Registered Nurse and . [x]         Fall prevention education was provided and the patient/caregiver indicated understanding. [x]         Patient/family have participated as able in goal setting and plan of care. [x]         Patient/family agree to work toward stated goals and plan of care. []         Patient understands intent and goals of therapy, but is neutral about his/her participation. []         Patient is unable to participate in goal setting and plan of care.     Thank you for this referral.  Nani Vazquez, PT, DPT   Time Calculation: 30 mins

## 2018-03-06 NOTE — OP NOTES
[facility]  ACUTE CARE OP NOTE    Neftaly Cárdenas  MR#: [id number]  : 1970  ACCOUNT #: [de-identified]   DATE OF SERVICE: 2018    PREOPERATIVE DIAGNOSES  1. L4-L5 spondylolisthesis. 2. L4-L5 spinal stenosis. 3.  Lumbago. 4.  Sciatica on bilateral lower extremities. POSTOPERATIVE DIAGNOSES  1. L4-L5 spondylolisthesis. 2. L4-L5 spinal stenosis. 3.  Lumbago. 4.  Sciatica on bilateral lower extremities. PROCEDURES PERFORMED   1. L4-L5 posterior fusion. 2. L4-L5 posterior instrumentation. 3. L4-L5 laminectomy, facetectomy, foraminotomy. 4.  Use of local autograft bone for spine fusion. SURGEON:  Pepe Wooten MD.     ASSISTANT:  Kristofer Yoder. ESTIMATED BLOOD LOSS:  100 mL. COMPLICATIONS:  None. SPECIMENS REMOVED:  None. ANESTHESIA:  General.    DRAINS:  x1. IMPLANTS:  [employer] matrix pedicle screw system. Please note that a modifier 22 was used for all codes in this procedure. Surgery took 50% longer to complete due to the patient's obesity and a BMI of 45, requiring 50% more time to complete all aspects of this procedure. INDICATIONS OF PROCEDURE:  The patient is a 79-year-old lady with an L5-S1 anterior fusion, now developed adjacent segment disease with spondylolisthesis and has failed to improve on nonoperative treatment, would much like to proceed with surgical intervention. I have given her warnings about the possible complications including, but not limited to pain, scar, bleeding, infection, nonunion, damage to surrounding structures, death, paralysis, blindness and stroke. She understood and wants to proceed. DESCRIPTION OF PROCEDURE:  Appropriate informed consent was obtained and the operative site was properly marked. The patient was moved back to the operating room and underwent general endotracheal anesthesia, was positioned prone on the operating table using the Lauren Hopping table flat 4 posterior frame.   Arms were placed in the 90/90 position, and knees were gently bent with pillows. Fluoroscopy was used to angelic the level of the incision. We then proceeded to prep and drape in the usual manner. Timeout was obtained to verify that it was the correct patient, the correct surgeon, the correct site, as well as had received antibiotics within 30 minutes of the incision. I then proceeded to perform a standard posterior approach to the lumbar spine exposing the area between L4 and L5. Once the area was exposed and hemostasis obtained, fluoroscopy was used to verify I am at the correct level. Then, using fluoroscopy in the AP view, I proceeded to angelic the entry hole for the pedicle with an all tap device at the 3:00 and 9:00 position. I was able to advance that all tap device until it hit the medial of the pedicle and stimulated with electrocautery. With no response noted, I proceeded to remove it and place the pedicle screw that had been premeasured. This was done bilaterally at L4 and L5 once all levels had been instrumented. I then turned my attention to the decompression going on the left side, performed U-cut laminectomy at L4 and a jig cut at L5. I removed the intervening bone with a pituitary and saved it for local autograft bone. I then proceeded to detach ligamentum flavum from the superior leading edge with a curved curet, flipped to the bony defect and removed with a Kerrison #3 followed by Kerrison #4. I was able to remove the medial ring of the facet and verify that the nerve root was fully decompressed. I then used a [employer], felt the foramina at L4-L5 and L5-S1 on the left and verified that it was fully decompressed and then I inspected the lateral recess on the right with a Diego ball and verified that it was decompressed also. Once this was completed, I irrigated the wound with a liter of normal saline.   I decorticated the posterior elements bilaterally, placed in the local autograft bone with cortical bone fibers soaked in bone marrow aspirate bilaterally. I then proceeded to place my rods and locking caps and a final tightening construct in place. Once this was completed, I proceeded to close the fascia with #1 Vicryl figure-of-eight interrupted sutures, again subcutaneous with subcutaneous drain, closed the subcutaneous fat with 0 V-Loc and the subcutaneous with 2-0 Vicryl and the skin with staples. Sterile dressing was applied. The patient was then awakened and transferred to PACU in stable condition. POSTOPERATIVE PLAN:  The patient is going to remain here for 1 or 2 days. We are going to give her SCD and RON hose for DVT prophylaxis and Ancef for infection prophylaxis.       Nory Shelby MD       AR / FRANCIS  D: 03/05/2018 19:08     T: 03/06/2018 07:48  JOB #: [id number]  CC: Radha GARCIA MD  CC: Sondra Muñiz BILLING OFFICE

## 2018-03-06 NOTE — PROGRESS NOTES
Problem: Self Care Deficits Care Plan (Adult)  Goal: *Acute Goals and Plan of Care (Insert Text)  Occupational Therapy Goals  Initiated 3/6/2018    1. Patient will perform lower body dressing with moderate assistance  using Reacher PRN within 7 days. 2.  Patient will perform toileting with supervision/set-up using most appropriate DME within 7 days. 3.  Patient will bathing at minimal assistance/contact guard assist within 7 days. 4.  Patient will don/doff back brace at supervision/set-up within 7 days. 5.  Patient will verbalize/demonstrate 3/3 back precautions during ADL tasks without cues within 7 days. Occupational Therapy EVALUATION  Patient: Brennan Dunbar (40 y.o. female)  Date: 3/6/2018  Primary Diagnosis: LUMBAGO, RADICULOPATHY, STENOSIS, SPONDELOLITHISIS  Spondylisthesis  LUMBAR  Procedure(s) (LRB):  L4-5 LATERAL FUSION WITH BONE MARROW ASPIRATION FROM LEFT  ILIAC CREST  (N/A)  L4-L5  POSTERIOR FUSION (N/A) 1 Day Post-Op   Precautions:   Back    ASSESSMENT :  Based on the objective data described below, the patient presents with generalized weakness, decreased endurance, strength, functional mobility, and ADLs. Pt was living with family and stated that she was having pain and was able to bath and dress self but was difficulty with LB. Pt was cleared to be seen for therapy and all VSS. Pt was educated on back precautions, log rolling and donning her back brace. Pt was complaining that she was having pain in right leg with walking and sitting and wanted to get back to bed. Encouraged pt to sit up and try a transfer to toilet. Pt was CGA for transfer to and from toilet with use of RW, and she was left sitting up in chair with legs propped and call bell with in reach and back brace remains on. Pt complained of pain in right leg and nursing notified and NP notified and will work with pt later today and issue hip kit to assist pt with LB dressing.   Recommend that pt have further therapy at discharge at home , with home care OT and PT and assist from family. Patient will benefit from skilled intervention to address the above impairments. Patients rehabilitation potential is considered to be Good  Factors which may influence rehabilitation potential include:   [x]             None noted  []             Mental ability/status  []             Medical condition  []             Home/family situation and support systems  []             Safety awareness  []             Pain tolerance/management  []             Other:      PLAN :  Recommendations and Planned Interventions:  [x]               Self Care Training                  []        Therapeutic Activities  [x]               Functional Mobility Training    []        Cognitive Retraining  [x]               Therapeutic Exercises           [x]        Endurance Activities  []               Balance Training                   []        Neuromuscular Re-Education  []               Visual/Perceptual Training     [x]   Home Safety Training  [x]               Patient Education                 [x]        Family Training/Education  []               Other (comment):    Frequency/Duration: Patient will be followed by occupational therapy 4 times a week to address goals. Discharge Recommendations: Home Health PT and OT  Further Equipment Recommendations for Discharge: tbd     SUBJECTIVE:   Patient stated I have so much pain in my right leg. I feel better when I am lying down.     OBJECTIVE DATA SUMMARY:   HISTORY:   Past Medical History:   Diagnosis Date    [facility]ipolar affective disorder [facility] [address]     Dissociative identity disorder     Hyperlipidemia     Hypertension     Morbid obesity ([facility] [address]     NIDDM (non-insulin dependent diabetes mellitus)     Psychiatric disorder     Depression/Anxiety    PTSD (post-traumatic stress disorder)      Past Surgical History:   Procedure Laterality Date    DELIVERY       HX BACK SURGERY      HX BREAST BIOPSY Left     u/s benign biopsy 3 years ago    HX CHOLECYSTECTOMY      HX TONSILLECTOMY      HX TUBAL LIGATION      ME REPAIR ACHILLES TENDON,PRIMARY         Prior Level of Function/Environment/Context: pt lives with family and was independent in all areas prior. Occupations in which the patient is/was successful, what are the barriers preventing that success: pain in right leg  Performance Patterns (routines, roles, habits, and rituals):   Personal Interests and/or values:   Expanded or extensive additional review of patient history:     Home Situation  Home Environment: Private residence  # Steps to Enter: 2  Rails to Enter: No  One/Two Story Residence: One story  Living Alone: No  Support Systems: Child(carolyn), Spouse/Significant Other/Partner  Patient Expects to be Discharged to[de-identified] Private residence  Current DME Used/Available at Home: [facility] [address], [facility]  Tub or Shower Type: Tub/Shower combination  [x]  Right hand dominant   []  Left hand dominant    EXAMINATION OF PERFORMANCE DEFICITS:  Cognitive/Behavioral Status:  Neurologic State: Alert; Appropriate for age  Orientation Level: Oriented X4  Cognition: Follows commands             Skin: in good health    Edema: none    Hearing: Auditory  Auditory Impairment: None  Hearing Aids/Status: Does not own    Vision/Perceptual:                    intact                 Range of Motion:    AROM: Generally decreased, functional  PROM: Within functional limits                      Strength:    Strength: Generally decreased, functional                Coordination:  Coordination: Within functional limits  Fine Motor Skills-Upper: Left Intact; Right Intact    Gross Motor Skills-Upper: Left Intact; Right Intact    Tone & Sensation:    Tone: Normal  Sensation: Intact                      Balance:  Sitting: Intact; Without support  Standing: Impaired; With support  Standing - Static: Good  Standing - Dynamic : Fair    Functional Mobility and Transfers for ADLs:  Bed Mobility:  Rolling: Supervision  Supine to Sit: Supervision  Scooting: Supervision    Transfers:  Sit to Stand: Contact guard assistance  Stand to Sit: Contact guard assistance  Bed to Chair: Contact guard assistance  Toilet Transfer : Contact guard assistance    ADL Assessment:  Feeding: Independent    Oral Facial Hygiene/Grooming: Independent    Bathing: Maximum assistance    Upper Body Dressing: Moderate assistance;Minimum assistance    Lower Body Dressing: Maximum assistance    Toileting: Contact guard assistance (for buttocks)         Functional Measure:  Barthel Index:    Bathin  Bladder: 10  Bowels: 10  Groomin  Dressin  Feeding: 10  Mobility: 10  Stairs: 5  Toilet Use: 10  Transfer (Bed to Chair and Back): 10  Total: 75       Barthel and G-code impairment scale:  Percentage of impairment CH  0% CI  1-19% CJ  20-39% CK  40-59% CL  60-79% CM  80-99% CN  100%   Barthel Score 0-100 100 99-80 79-60 59-40 20-39 1-19   0   Barthel Score 0-20 20 17-19 13-16 9-12 5-8 1-4 0      The Barthel ADL Index: Guidelines  1. The index should be used as a record of what a patient does, not as a record of what a patient could do. 2. The main aim is to establish degree of independence from any help, physical or verbal, however minor and for whatever reason. 3. The need for supervision renders the patient not independent. 4. A patient's performance should be established using the best available evidence. Asking the patient, friends/relatives and nurses are the usual sources, but direct observation and common sense are also important. However direct testing is not needed. 5. Usually the patient's performance over the preceding 24-48 hours is important, but occasionally longer periods will be relevant. 6. Middle categories imply that the patient supplies over 50 per cent of the effort. 7. Use of aids to be independent is allowed. Dane Rather., Barthel, D.W. (4403). Functional evaluation: the Barthel Index.  [facility] ([address], J.CAN.[email or web address].F, Ata Borrego., Tan Martínez., Laura Logan (1999). Measuring the change indisability after inpatient rehabilitation; comparison of the responsiveness of the Barthel Index and Functional [place] Measure. Journal of Neurology, Neurosurgery, and Psychiatry, 66(4), 881-502. CHRISTOFER Marcano, LURDES uMniz, & Alis Fonseca M.A. (2004.) Assessment of post-stroke quality of life in cost-effectiveness studies: The usefulness of the Barthel Index and the EuroQoL-5D. Quality of Life Research, 13, 797-01         G codes: In compliance with CMSs Claims Based Outcome Reporting, the following G-code set was chosen for this patient based on their primary functional limitation being treated: The outcome measure chosen to determine the severity of the functional limitation was the Barthel with a score of 75/100 which was correlated with the impairment scale. ?  Self Care:     - CURRENT STATUS: CJ - 20%-39% impaired, limited or restricted    - GOAL STATUS: CI - 1%-19% impaired, limited or restricted    - D/C STATUS:  ---------------To be determined---------------     Occupational Therapy Evaluation Charge Determination   History Examination Decision-Making   MEDIUM Complexity : Expanded review of history including physical, cognitive and psychosocial  history  LOW Complexity : 1-3 performance deficits relating to physical, cognitive , or psychosocial skils that result in activity limitations and / or participation restrictions  LOW Complexity : No comorbidities that affect functional and no verbal or physical assistance needed to complete eval tasks       Based on the above components, the patient evaluation is determined to be of the following complexity level: LOW   Pain:  Pain Scale 1: Numeric (0 - 10)  Pain Intensity 1: 10  Pain Location 1: Back  Pain Orientation 1: Lower  Pain Description 1: Aching  Pain Intervention(s) 1: Medication (see MAR);Rest;Repositioned  Activity Tolerance:   vss  Please refer to the flowsheet for vital signs taken during this treatment. After treatment:   [x] Patient left in no apparent distress sitting up in chair  [] Patient left in no apparent distress in bed  [x] Call bell left within reach  [x] Nursing notified  [x] Caregiver present  [] Bed alarm activated    COMMUNICATION/EDUCATION:   The patients plan of care was discussed with: Physical Therapist, Registered Nurse and family. [x] Home safety education was provided and the patient/caregiver indicated understanding. [x] Patient/family have participated as able in goal setting and plan of care. [x] Patient/family agree to work toward stated goals and plan of care. [] Patient understands intent and goals of therapy, but is neutral about his/her participation. [] Patient is unable to participate in goal setting and plan of care. This patients plan of care is appropriate for delegation to [facility].     Thank you for this referral.  Ezekiel Duncan OT  Time Calculation: 20 mins

## 2018-03-06 NOTE — OP NOTES
[facility]  ACUTE CARE OP NOTE    Jeanna Garrido  MR#: [id number]  : 1970  ACCOUNT #: [de-identified]   DATE OF SERVICE: 2018    PREOPERATIVE DIAGNOSES  1. Status post L5-S1 anterior fusion. 2. L4-L5 spondylolisthesis. 3.  Lumbago. 5.  Bilateral lower extremity sciatica. 6.  Obesity with a BMI of 45. POSTOPERATIVE DIAGNOSES  1. Status post L5-S1 anterior fusion. 2. L4-L5 spondylolisthesis. 3.  Lumbago. 5.  Bilateral lower extremity sciatica. 6.  Obesity with a BMI of 45. PROCEDURES PERFORMED  1. L4-L5 anterior fusion. 2. L4-L5 insertion of interbody biomechanical device. 3. L4-L5 anterior instrumentation. 4.  Bone marrow aspirate from the left posterior superior iliac spine for spinal fusion augmentation. Please note that a modifier 22 was used for all codes of this procedure, as surgery took 50% longer than usual to complete due to the patient's large body habitus and obesity with a BMI of 45, in addition to having to deal with the previous scar tissue through the retroperitoneal approach requiring more time to dissect through the adhesions and expose the desired level. SURGEON:  Gabriel Loera MD    ASSISTANT:  Dereje Garcia PA-C     ESTIMATED BLOOD LOSS:  20 mL. COMPLICATIONS:  None. SPECIMENS:  None. ANESTHESIA:  General.    DRAINS:  None. IMPLANTS:  The [employer] OLIF cage &  OLIF plate. INDICATIONS FOR PROCEDURE:  The patient is a very pleasant 51-year-old lady with a previous L5-S1 anterior fusion with the spondylolisthesis at L4-L5 resulting in stenosis and lumbago and sciatica and has failed to improve with nonoperative treatment and is much like to proceed with surgical intervention. I have given her warnings about possible complications, including but not limited to pain, scar, bleeding, infection, nonunion, damage to other structures, death, paralysis, blindness, and stroke.   She understands and wants to proceed. DESCRIPTION:  After informed consent was obtained and the operative site was properly marked, the patient was moved back to the operating room and underwent general endotracheal anesthesia. She was positioned on the operating table in the lateral decubitus with the left side up. Axillary roll was applied and a bump was placed under the iliac crest on the right. The arms were bent and well padded. The knees were gently bent and well-padded with special attention to the peroneal nerve. I proceeded then to prep and drape in usual manner followed by obtaining a timeout, verifying that is at the correct patient, the correct surgeon, and the correct site, as well as that she had received antibiotics within an hour of the incision. Once everything was completed, I proceeded to make a small incision posteriorly over the left posterior superior iliac spine and aspirate 60 mL of bone marrow with the Jamshidi needle that was used to augment the bone graft during the surgery. Once this was completed, I proceeded then to use the same incision to place my navigation post in the PSIS. Once that was in place, I proceeded then to perform the O-Arm spin for navigation. Once that was completed, I was able to use a navigation to angelic my incision anterior to the L4 vertebral body about 2 fingerbreadths anterior to it. Through that incision, I was able then to incise the skin and then dissect the abdominal musculature layers bluntly individually through the external, internal and transversus abdominis. I was able then through a retroperitoneal approach, find the psoas and found that there were adhesions and there was what appeared to be part of the colon attached to the anterior part of the psoas. With a wet sponge and a Kitner, I was able to gently mobilize that part of the colon anteriorly and expose this completely. Once this was exposed, I retracted the psoas to find my OLIF window.   Then, the retractor was placed in position and I then began to prepare for the OLIF by using a 15 blade to perform a box annulotomy. A pituitary and then used a Dominguez to detach the cartilaginous endplate anterior and superior and popped through the contralateral annulus. I used jesus to determine the size and then I used trials. With the appropriate trial site selected, I proceeded then to use a pituitary to remove the disk debris and I selected the appropriate size implant. The cancellous bony sponge was soaked in bone marrow aspirate and supplemented with some artificial bone graft to be placed in the cage and then inserted in the intervertebral space going across the midline and into the contralateral side being countersunk on the left and remaining away from the canal.  Once in appropriate location, I proceeded then to remove my  and select a plate and drilled 20 mm screws, one at L4 and one at L5. With the screws in place, the plate was tightened. X-ray was taken and sent to PACS. I proceeded then to close the transverse abdominis with running #1 Vicryl followed by closing the external and internal oblique, each, with a #1 Vicryl running suture. The subcutaneous was then closed with 2-0 Vicryl and the skin with a 2-0 running Monocryl and Dermabond. Sterile dressing was applied. The patient was then positioned prone for the posterior approach that this has been dictated separately.       MD LILIAN Castro / [place]  D: 03/05/2018 17:12     T: 03/06/2018 08:46  JOB #: [id number]  CC: Jill Solorzano MD

## 2018-03-06 NOTE — INTERDISCIPLINARY ROUNDS
Bedside interdisciplinary rounds were held today to discuss patient plan of care and outcomes. The following members were present: Nurse Practitioner, Nurse, Clinical Care Leader, Pharmacy, Physical Therapy, and Case Management. Plan:  New RLE pain when working with PT. Stat MRI ordered to evaluate cause of pain - pending findings, patient may need to return to the OR. Continue PT as pain allows in the meantime.

## 2018-03-06 NOTE — DISCHARGE INSTRUCTIONS
[facility]    Discharge Instruction Sheet: POSTERIOR SPINAL FUSION and Decompression L4-5    DR. LARA    Pain control:   Typically, we will prescribe a narcotic usually 1-2 tabs every four hours is    sufficient for the pain. Most patients need this only for the first few weeks. You   should discontinue this as the pain decreases. You should not drive while taking any narcotic pain medications. Constipation  Pain medicines and anesthesia can be constipating-this can be prevented by gentle physical activity and drinking plenty of fluid. It should be treated with over-the-counter medications such as Miralax or suppositories, and/or Fleets enema. You should have a bowel movement at least every other day following surgery. Incision care     Keep this area clean and dry. Your dressing is designed to stay in place for 5-7 days. You will be sent home with one additional dressing to change at that time. Leave this new dressing in place until our follow up visit in the office in about 10-14 days. If staples are in place, they should be removed about 14-20 days after surgery. You may shower with this impermeable dressing in place. DO NOT take a tub bath or go swimming until cleared by your doctor. DO NOT apply lotions, oils, or creams to incision. To increase and promote healing:   Stop Smoking (or at least cut back on smoking).  Eat a well-balanced diet (high in protein and vitamin C)   If your appetite is poor, consider nutritional supplements like Ensure, Glucerna, or [place] Instant Breakfast.   If you are diabetic, controlling you blood sugars is very important to prevent infection and promote wound healing. Nutrition:   If you were on a supplement such as Ensure or Glucerna) while in the hospital, please continue using them with each meal for the next 30 days.    Eat a well-balanced diet - High in protein, high in vitamins and minerals, especially vitamin C and zinc.     Restrictions:   Remember your \"BLT's\"    1. Limited bending at waist    2. Lift no more than 10 pounds    3. No Twisting     If you were given a brace, wear it when out of bed. Warning signs : Please call your physician immediately at [phone] if you have   Bleeding from incision that is constant.  Change in mental status (unusual behavior or confusion)   If your incision develops redness or swelling   Change in wound drainage (increase in amount, color, or foul odor)   [place] over 101.5 degrees Fahrenheit    Headache that is not relieved with pain medication   Tenderness or redness in the calf of your leg    Emergency: CALL 911 if you have   Shortness of breath   Chest pain   Localized chest pain when coughing or taking a deep breath    Follow-up  Please call Dr. Lamas Speed office for a follow up appointment in 2 weeks at [phone]. You can return to work when cleared by a physician. During normal business hours you may reach Dr. Rosana Rodriguez' team directly at [phone] if you have concerns or questions.     Agnes Maciel

## 2018-03-06 NOTE — PROGRESS NOTES
TRANSFER - IN REPORT:    Verbal report received from Eduardo Leiva RN(name) on Maritza Bounds  being received from PACU(unit) for routine progression of care      Report consisted of patients Situation, Background, Assessment and   Recommendations(SBAR). Information from the following report(s) SBAR, Kardex, Procedure Summary, Intake/Output, MAR, Accordion, Recent Results and Med Rec Status was reviewed with the receiving nurse. Opportunity for questions and clarification was provided. Assessment completed upon patients arrival to unit and care assumed.

## 2018-03-07 ENCOUNTER — APPOINTMENT (OUTPATIENT)
Dept: GENERAL RADIOLOGY | Age: 48
DRG: 454 | End: 2018-03-07
Attending: ORTHOPAEDIC SURGERY
Payer: MEDICARE

## 2018-03-07 LAB
GLUCOSE BLD STRIP.AUTO-MCNC: 141 MG/DL (ref 65–100)
GLUCOSE BLD STRIP.AUTO-MCNC: 143 MG/DL (ref 65–100)
GLUCOSE BLD STRIP.AUTO-MCNC: 150 MG/DL (ref 65–100)
GLUCOSE BLD STRIP.AUTO-MCNC: 158 MG/DL (ref 65–100)
HGB BLD-MCNC: 9.6 G/DL (ref 11.5–16)
SERVICE CMNT-IMP: ABNORMAL

## 2018-03-07 PROCEDURE — G8988 SELF CARE GOAL STATUS: HCPCS

## 2018-03-07 PROCEDURE — 74011250636 HC RX REV CODE- 250/636: Performed by: ORTHOPAEDIC SURGERY

## 2018-03-07 PROCEDURE — 72100 X-RAY EXAM L-S SPINE 2/3 VWS: CPT

## 2018-03-07 PROCEDURE — 97168 OT RE-EVAL EST PLAN CARE: CPT

## 2018-03-07 PROCEDURE — 97164 PT RE-EVAL EST PLAN CARE: CPT

## 2018-03-07 PROCEDURE — 97530 THERAPEUTIC ACTIVITIES: CPT

## 2018-03-07 PROCEDURE — 65270000029 HC RM PRIVATE

## 2018-03-07 PROCEDURE — 74011636637 HC RX REV CODE- 636/637: Performed by: ORTHOPAEDIC SURGERY

## 2018-03-07 PROCEDURE — 85018 HEMOGLOBIN: CPT | Performed by: ORTHOPAEDIC SURGERY

## 2018-03-07 PROCEDURE — 97535 SELF CARE MNGMENT TRAINING: CPT

## 2018-03-07 PROCEDURE — 77030028907 HC WRP KNEE WO BGS SOLM -B

## 2018-03-07 PROCEDURE — 74011250637 HC RX REV CODE- 250/637: Performed by: ORTHOPAEDIC SURGERY

## 2018-03-07 PROCEDURE — 74011250637 HC RX REV CODE- 250/637: Performed by: NURSE PRACTITIONER

## 2018-03-07 PROCEDURE — G8987 SELF CARE CURRENT STATUS: HCPCS

## 2018-03-07 PROCEDURE — 97116 GAIT TRAINING THERAPY: CPT

## 2018-03-07 PROCEDURE — 77010033678 HC OXYGEN DAILY

## 2018-03-07 PROCEDURE — 82962 GLUCOSE BLOOD TEST: CPT

## 2018-03-07 PROCEDURE — 74011636637 HC RX REV CODE- 636/637: Performed by: NURSE PRACTITIONER

## 2018-03-07 PROCEDURE — 36415 COLL VENOUS BLD VENIPUNCTURE: CPT | Performed by: ORTHOPAEDIC SURGERY

## 2018-03-07 RX ORDER — GABAPENTIN 100 MG/1
100 CAPSULE ORAL 3 TIMES DAILY
Status: DISCONTINUED | OUTPATIENT
Start: 2018-03-07 | End: 2018-03-07

## 2018-03-07 RX ORDER — HYDROXYZINE HYDROCHLORIDE 10 MG/1
10 TABLET, FILM COATED ORAL
Status: DISCONTINUED | OUTPATIENT
Start: 2018-03-07 | End: 2018-03-08 | Stop reason: HOSPADM

## 2018-03-07 RX ORDER — GABAPENTIN 300 MG/1
300 CAPSULE ORAL 2 TIMES DAILY
Status: DISCONTINUED | OUTPATIENT
Start: 2018-03-07 | End: 2018-03-08

## 2018-03-07 RX ORDER — AMOXICILLIN 250 MG
1 CAPSULE ORAL 2 TIMES DAILY
Status: DISCONTINUED | OUTPATIENT
Start: 2018-03-07 | End: 2018-03-08 | Stop reason: HOSPADM

## 2018-03-07 RX ORDER — OXYCODONE HYDROCHLORIDE 5 MG/1
5-10 TABLET ORAL
Qty: 80 TAB | Refills: 0 | Status: SHIPPED | OUTPATIENT
Start: 2018-03-07 | End: 2021-01-11

## 2018-03-07 RX ORDER — CEFAZOLIN SODIUM/WATER 2 G/20 ML
2 SYRINGE (ML) INTRAVENOUS EVERY 8 HOURS
Status: COMPLETED | OUTPATIENT
Start: 2018-03-07 | End: 2018-03-07

## 2018-03-07 RX ORDER — HYDROMORPHONE HYDROCHLORIDE 2 MG/ML
1 INJECTION, SOLUTION INTRAMUSCULAR; INTRAVENOUS; SUBCUTANEOUS
Status: DISPENSED | OUTPATIENT
Start: 2018-03-07 | End: 2018-03-08

## 2018-03-07 RX ORDER — INSULIN LISPRO 100 [IU]/ML
8 INJECTION, SOLUTION INTRAVENOUS; SUBCUTANEOUS
Status: DISCONTINUED | OUTPATIENT
Start: 2018-03-07 | End: 2018-03-08 | Stop reason: HOSPADM

## 2018-03-07 RX ORDER — DIAZEPAM 5 MG/1
5 TABLET ORAL
Status: DISCONTINUED | OUTPATIENT
Start: 2018-03-07 | End: 2018-03-07

## 2018-03-07 RX ORDER — CLONAZEPAM 1 MG/1
1 TABLET ORAL 2 TIMES DAILY
Status: DISCONTINUED | OUTPATIENT
Start: 2018-03-07 | End: 2018-03-08 | Stop reason: HOSPADM

## 2018-03-07 RX ORDER — OXCARBAZEPINE 150 MG/1
300 TABLET, FILM COATED ORAL 2 TIMES DAILY
Status: DISCONTINUED | OUTPATIENT
Start: 2018-03-07 | End: 2018-03-08 | Stop reason: HOSPADM

## 2018-03-07 RX ORDER — OXYCODONE HYDROCHLORIDE 5 MG/1
10-15 TABLET ORAL
Status: DISCONTINUED | OUTPATIENT
Start: 2018-03-07 | End: 2018-03-08 | Stop reason: HOSPADM

## 2018-03-07 RX ORDER — ONDANSETRON 2 MG/ML
4 INJECTION INTRAMUSCULAR; INTRAVENOUS
Status: ACTIVE | OUTPATIENT
Start: 2018-03-07 | End: 2018-03-08

## 2018-03-07 RX ORDER — DEXTROSE 50 % IN WATER (D50W) INTRAVENOUS SYRINGE
12.5-25 AS NEEDED
Status: DISCONTINUED | OUTPATIENT
Start: 2018-03-07 | End: 2018-03-08 | Stop reason: HOSPADM

## 2018-03-07 RX ORDER — SODIUM CHLORIDE 0.9 % (FLUSH) 0.9 %
5-10 SYRINGE (ML) INJECTION AS NEEDED
Status: DISCONTINUED | OUTPATIENT
Start: 2018-03-07 | End: 2018-03-08 | Stop reason: HOSPADM

## 2018-03-07 RX ORDER — ACETAMINOPHEN 500 MG
1000 TABLET ORAL EVERY 6 HOURS
Status: DISCONTINUED | OUTPATIENT
Start: 2018-03-07 | End: 2018-03-08 | Stop reason: HOSPADM

## 2018-03-07 RX ORDER — FACIAL-BODY WIPES
10 EACH TOPICAL DAILY PRN
Status: DISCONTINUED | OUTPATIENT
Start: 2018-03-08 | End: 2018-03-08 | Stop reason: HOSPADM

## 2018-03-07 RX ORDER — BACLOFEN 10 MG/1
10 TABLET ORAL EVERY EVENING
Status: DISCONTINUED | OUTPATIENT
Start: 2018-03-07 | End: 2018-03-08 | Stop reason: HOSPADM

## 2018-03-07 RX ORDER — POLYETHYLENE GLYCOL 3350 17 G/17G
17 POWDER, FOR SOLUTION ORAL DAILY
Status: DISCONTINUED | OUTPATIENT
Start: 2018-03-07 | End: 2018-03-08 | Stop reason: HOSPADM

## 2018-03-07 RX ORDER — LAMOTRIGINE 25 MG/1
50 TABLET ORAL DAILY
Status: DISCONTINUED | OUTPATIENT
Start: 2018-03-07 | End: 2018-03-08 | Stop reason: HOSPADM

## 2018-03-07 RX ORDER — NALOXONE HYDROCHLORIDE 0.4 MG/ML
0.4 INJECTION, SOLUTION INTRAMUSCULAR; INTRAVENOUS; SUBCUTANEOUS AS NEEDED
Status: DISCONTINUED | OUTPATIENT
Start: 2018-03-07 | End: 2018-03-08 | Stop reason: HOSPADM

## 2018-03-07 RX ORDER — ATORVASTATIN CALCIUM 40 MG/1
40 TABLET, FILM COATED ORAL DAILY
Status: DISCONTINUED | OUTPATIENT
Start: 2018-03-07 | End: 2018-03-08 | Stop reason: HOSPADM

## 2018-03-07 RX ORDER — AMOXICILLIN 250 MG
1 CAPSULE ORAL DAILY
Qty: 30 TAB | Refills: 0 | Status: SHIPPED | OUTPATIENT
Start: 2018-03-07 | End: 2021-01-11

## 2018-03-07 RX ORDER — MAGNESIUM SULFATE 100 %
4 CRYSTALS MISCELLANEOUS AS NEEDED
Status: DISCONTINUED | OUTPATIENT
Start: 2018-03-07 | End: 2018-03-08 | Stop reason: HOSPADM

## 2018-03-07 RX ORDER — FAMOTIDINE 20 MG/1
20 TABLET, FILM COATED ORAL 2 TIMES DAILY
Status: DISCONTINUED | OUTPATIENT
Start: 2018-03-07 | End: 2018-03-08 | Stop reason: HOSPADM

## 2018-03-07 RX ORDER — CLONAZEPAM 0.5 MG/1
0.5 TABLET ORAL
Status: DISCONTINUED | OUTPATIENT
Start: 2018-03-07 | End: 2018-03-08 | Stop reason: HOSPADM

## 2018-03-07 RX ORDER — OXYCODONE HYDROCHLORIDE 5 MG/1
5 TABLET ORAL
Status: DISCONTINUED | OUTPATIENT
Start: 2018-03-07 | End: 2018-03-08 | Stop reason: HOSPADM

## 2018-03-07 RX ORDER — INSULIN LISPRO 100 [IU]/ML
INJECTION, SOLUTION INTRAVENOUS; SUBCUTANEOUS
Status: DISCONTINUED | OUTPATIENT
Start: 2018-03-07 | End: 2018-03-08 | Stop reason: HOSPADM

## 2018-03-07 RX ORDER — SODIUM CHLORIDE 0.9 % (FLUSH) 0.9 %
5-10 SYRINGE (ML) INJECTION EVERY 8 HOURS
Status: DISCONTINUED | OUTPATIENT
Start: 2018-03-07 | End: 2018-03-08 | Stop reason: HOSPADM

## 2018-03-07 RX ORDER — POLYETHYLENE GLYCOL 3350 17 G/17G
17 POWDER, FOR SOLUTION ORAL
Qty: 15 PACKET | Refills: 0 | Status: SHIPPED | OUTPATIENT
Start: 2018-03-07 | End: 2018-03-22

## 2018-03-07 RX ORDER — DEXAMETHASONE SODIUM PHOSPHATE 4 MG/ML
10 INJECTION, SOLUTION INTRA-ARTICULAR; INTRALESIONAL; INTRAMUSCULAR; INTRAVENOUS; SOFT TISSUE ONCE
Status: COMPLETED | OUTPATIENT
Start: 2018-03-07 | End: 2018-03-07

## 2018-03-07 RX ORDER — CYCLOBENZAPRINE HCL 10 MG
10 TABLET ORAL
Status: DISCONTINUED | OUTPATIENT
Start: 2018-03-07 | End: 2018-03-08 | Stop reason: HOSPADM

## 2018-03-07 RX ADMIN — OXYCODONE HYDROCHLORIDE 10 MG: 5 TABLET ORAL at 14:08

## 2018-03-07 RX ADMIN — CYCLOBENZAPRINE HYDROCHLORIDE 10 MG: 10 TABLET, FILM COATED ORAL at 09:47

## 2018-03-07 RX ADMIN — CLONAZEPAM 1 MG: 1 TABLET ORAL at 11:06

## 2018-03-07 RX ADMIN — INSULIN LISPRO 4 UNITS: 100 INJECTION, SOLUTION INTRAVENOUS; SUBCUTANEOUS at 09:47

## 2018-03-07 RX ADMIN — POLYETHYLENE GLYCOL 3350 17 G: 17 POWDER, FOR SOLUTION ORAL at 09:46

## 2018-03-07 RX ADMIN — LAMOTRIGINE 50 MG: 25 TABLET ORAL at 11:07

## 2018-03-07 RX ADMIN — HYDROMORPHONE HYDROCHLORIDE 1 MG: 2 INJECTION, SOLUTION INTRAMUSCULAR; INTRAVENOUS; SUBCUTANEOUS at 08:37

## 2018-03-07 RX ADMIN — ACETAMINOPHEN 1000 MG: 500 TABLET ORAL at 11:07

## 2018-03-07 RX ADMIN — ATORVASTATIN CALCIUM 40 MG: 40 TABLET, FILM COATED ORAL at 11:08

## 2018-03-07 RX ADMIN — OXCARBAZEPINE 300 MG: 150 TABLET ORAL at 11:07

## 2018-03-07 RX ADMIN — CLONAZEPAM 0.5 MG: 0.5 TABLET ORAL at 11:06

## 2018-03-07 RX ADMIN — Medication 2 G: at 04:22

## 2018-03-07 RX ADMIN — ACETAMINOPHEN 1000 MG: 500 TABLET ORAL at 06:32

## 2018-03-07 RX ADMIN — DEXAMETHASONE SODIUM PHOSPHATE 10 MG: 4 INJECTION, SOLUTION INTRAMUSCULAR; INTRAVENOUS at 23:13

## 2018-03-07 RX ADMIN — GABAPENTIN 300 MG: 300 CAPSULE ORAL at 17:26

## 2018-03-07 RX ADMIN — INSULIN LISPRO 2 UNITS: 100 INJECTION, SOLUTION INTRAVENOUS; SUBCUTANEOUS at 12:45

## 2018-03-07 RX ADMIN — OXCARBAZEPINE 300 MG: 150 TABLET ORAL at 17:26

## 2018-03-07 RX ADMIN — GABAPENTIN 100 MG: 100 CAPSULE ORAL at 09:46

## 2018-03-07 RX ADMIN — INSULIN LISPRO 8 UNITS: 100 INJECTION, SOLUTION INTRAVENOUS; SUBCUTANEOUS at 18:05

## 2018-03-07 RX ADMIN — HYDROMORPHONE HYDROCHLORIDE 1 MG: 2 INJECTION, SOLUTION INTRAMUSCULAR; INTRAVENOUS; SUBCUTANEOUS at 23:13

## 2018-03-07 RX ADMIN — Medication 10 ML: at 14:00

## 2018-03-07 RX ADMIN — BACLOFEN 10 MG: 10 TABLET ORAL at 20:27

## 2018-03-07 RX ADMIN — FAMOTIDINE 20 MG: 20 TABLET, FILM COATED ORAL at 09:47

## 2018-03-07 RX ADMIN — Medication 2 G: at 14:05

## 2018-03-07 RX ADMIN — FENTANYL CITRATE 25 MCG: 50 INJECTION, SOLUTION INTRAMUSCULAR; INTRAVENOUS at 00:15

## 2018-03-07 RX ADMIN — Medication 10 ML: at 06:32

## 2018-03-07 RX ADMIN — DOCUSATE SODIUM AND SENNOSIDES 1 TABLET: 8.6; 5 TABLET, FILM COATED ORAL at 09:46

## 2018-03-07 RX ADMIN — Medication 10 ML: at 23:14

## 2018-03-07 RX ADMIN — GABAPENTIN 300 MG: 300 CAPSULE ORAL at 11:08

## 2018-03-07 RX ADMIN — INSULIN LISPRO 2 UNITS: 100 INJECTION, SOLUTION INTRAVENOUS; SUBCUTANEOUS at 18:06

## 2018-03-07 RX ADMIN — ACETAMINOPHEN 1000 MG: 500 TABLET ORAL at 23:13

## 2018-03-07 RX ADMIN — CLONAZEPAM 1 MG: 1 TABLET ORAL at 20:27

## 2018-03-07 RX ADMIN — OXYCODONE HYDROCHLORIDE 10 MG: 5 TABLET ORAL at 20:27

## 2018-03-07 RX ADMIN — ACETAMINOPHEN 1000 MG: 500 TABLET ORAL at 17:26

## 2018-03-07 NOTE — PERIOP NOTES
Handoff Report from Operating Room to PACU    Report received from Mayra Ferrell RN and Faisal Dorado CRNA regarding Milbert Emily. Surgeon(s):  Ramona Kaur MD  And Procedure(s) (LRB):  L4-5 Decompression (N/A)  confirmed   with allergies, dressings and local anesthetic discussed. Anesthesia type, drugs, patient history, complications, estimated blood loss, vital signs, intake and output, and last pain medication, lines, reversal medications and temperature were reviewed.

## 2018-03-07 NOTE — PROGRESS NOTES
Pt is a 42 y/o  female admitted with lumbago radiculaopathy stenosis. Pt is independent to ADLs and IADLs and will drive only short distances. Pt has access to a cane but stated she will need a RW at discharge. PT recommending acute rehab at discharge. FOC given and pt chose [facility]. Referral sent and they approved but pt insurance typically does not approve acute rehab. BUD spoke with Reji Garcia with [address] [place] [address] who stated the info could be faxed for review but pt should have a back up plan such as SNF. SW informed pt. Pt stated she wanted to discuss with her spouse prior to making a decision. SNF list left for the pt to review. SW will continue to follow and assist with additional discharge needs. Care Management Interventions  PCP Verified by CM: Yes  Mode of Transport at Discharge:  Other (see comment) (TBD)  Transition of Care Consult (CM Consult): Discharge Planning (SNF vs acute rehab)  Physical Therapy Consult: Yes  Occupational Therapy Consult: Yes  Confirm Follow Up Transport: Family (Pt was only driving short distances)  Plan discussed with Pt/Family/Caregiver: Yes  Freedom of Choice Offered: Yes  Discharge Location  Discharge Placement:  (TBD)    KATHARINE Mojica  Ext [phone]

## 2018-03-07 NOTE — BRIEF OP NOTE
BRIEF OPERATIVE NOTE    Date of Procedure: 3/6/2018   Preoperative Diagnosis: LUMBAGO, RADICULOPATHY, STENOSIS, SPONDELOLITHISIS  Postoperative Diagnosis: LUMBAGO, RADICULOPATHY, STENOSIS, SPONDELOLITHISIS    Procedure(s):  L4-5 Decompression  Surgeon(s) and Role:     * Ish Rodriguez MD - Primary         Assistant Staff: None      Surgical Staff:  Circ-1: Huma Ventura RN  Circ-Relief: Presley Lopez RN  Scrub Tech-1: Kedar Grade  Surg Asst-1: Kaitlin Haro  Surg Asst-Relief: Breezy Lau  Event Time In   Incision Start 2245   Incision Close      Anesthesia: General   Estimated Blood Loss: 25cc  Specimens: * No specimens in log *   Findings: stenosis   Complications: none  Implants: * No implants in log *

## 2018-03-07 NOTE — ANESTHESIA POSTPROCEDURE EVALUATION
Post-Anesthesia Evaluation and Assessment    Patient: Juan Manuel Rowell MRN: [id number]  SSN: [id number]    YOB: 1970  Age: 41 y.o. Sex: female       Cardiovascular Function/Vital Signs  Visit Vitals    /69    Pulse 84    Temp 36.6 °C (97.8 °F)    Resp 9    Ht 5' 2\" (1.575 m)    Wt 121.1 kg (266 lb 15.6 oz)    SpO2 98%    BMI 48.83 kg/m2       Patient is status post general anesthesia for Procedure(s):  L4-5 Decompression. Nausea/Vomiting: None    Postoperative hydration reviewed and adequate. Pain:  Pain Scale 1: FLACC (03/07/18 0045)  Pain Intensity 1: 0 (03/07/18 0045)   Managed    Neurological Status:   Neuro (WDL): Exceptions to WDL (03/07/18 0045)  Neuro  Neurologic State: Sleeping (03/07/18 0045)  Orientation Level: Oriented to person;Oriented to situation (03/06/18 2341)  Cognition: Follows commands (03/06/18 2341)  Speech: Delayed responses; Appropriate for age (03/06/18 2341)  LUE Motor Response: Purposeful (03/06/18 2341)  LLE Motor Response: Purposeful (03/06/18 2341)  RUE Motor Response: Purposeful (03/06/18 2341)  RLE Motor Response: Purposeful (03/06/18 2341)   At baseline    Mental Status and Level of Consciousness: Arousable    Pulmonary Status:   O2 Device: Nasal cannula (03/07/18 0045)   Adequate oxygenation and airway patent    Complications related to anesthesia: None    Post-anesthesia assessment completed.  No concerns    Signed By: Cierra Beauchamp MD     March 7, 2018

## 2018-03-07 NOTE — INTERDISCIPLINARY ROUNDS
Bedside interdisciplinary rounds were held today to discuss patient plan of care and outcomes. The following members were present: Nurse Practitioner, Nurse, Clinical Care Leader, Pharmacy, Physical Therapy, and Case Management. Plan:  Reports pain improved following procedure last night. Voiding. Slow progress with PT - Inpatient Rehab consult.

## 2018-03-07 NOTE — ROUTINE PROCESS
Bedside and Verbal shift change report given to Pipo Saenz RN (oncoming nurse) by Katherene Krabbe, RN (offgoing nurse). Report included the following information SBAR, Kardex, Intake/Output, MAR, Recent Results and Med Rec Status.

## 2018-03-07 NOTE — PROGRESS NOTES
ORTHO POST OP SPINE PROGRESS NOTE    2018  Admit Date: 3/5/2018  Admit Diagnosis: LUMBAGO, RADICULOPATHY, STENOSIS, SPONDELOLITHISIS  Spondylisthesis  LUMBAR  Procedure: Procedure(s):  L4-5 Decompression  Post Op day: 1 Day Post-Op    Subjective:     Aleta Garcia is a patient who has complaints of back pain, improved RLE pain POD#1 R L4-5 decompression, POD#2 s/p L4-5 posterior and lateral fusion. tolerating po and able to void. .     Review of Systems: Pertinent items are noted in HPI. Objective:     PT/OT:   Distance Ambulated:           Time Ambulated (min):        Ambulation Response: Activity Response: Fairly tolerated  Assistive Device:              Assistive Device: Fall prevention device, Walker (comment)    Vital Signs:    Blood pressure 146/76, pulse 87, temperature 98.8 °F (37.1 °C), resp. rate 16, height 5' 2\" (1.575 m), weight 121.1 kg (266 lb 15.6 oz), last menstrual period 2018, SpO2 96 %.     Temp (24hrs), Av.4 °F (36.9 °C), Min:97.8 °F (36.6 °C), Max:98.9 °F (37.2 °C)          1901 -  0700  In: 1850 [I.V.:1850]  Out: 1380 [Urine:1325; Drains:30]    LAB:    Recent Labs      18   0541   HGB  9.6*       Wound/Drain Assessment:  Drain:      Dressing:     Physical Exam:  Neurological: no deficit  Incision clean, dry, and intact and JAMES intact  5/5 BLE    Assessment:      Patient Active Problem List   Diagnosis Code    Bipolar disorder, mixed ([facility] [address] F31.60    Hyponatremia E87.1    Spondylisthesis M43.10    S/P lumbar spinal fusion Z98.1       Plan:     Continue PT/OT/Rehab  Discontinue:   Consult: PT  and OT    Discharge To: home likely pending progress       Signed By: Olinda Hamman, PA

## 2018-03-07 NOTE — PROGRESS NOTES
Problem: Mobility Impaired (Adult and Pediatric)  Goal: *Acute Goals and Plan of Care (Insert Text)  Physical Therapy Goals  Initiated 3/6/2018    1. Patient will move from supine to sit and sit to supine , scoot up and down and roll side to side in bed with independence within 4 days. 2. Patient will perform sit to stand with modified independence within 4 days. 3. Patient will ambulate with modified independence for 150 feet with the least restrictive device within 4 days. 4. Patient will ascend/descend 4 stairs with one sided handrail(s) with modified independence within 4 days. 5. Patient will verbalize and demonstrate understanding of spinal precautions (No bending, lifting greater than 5 lbs, or twisting; log-roll technique; frequent repositioning as instructed) within 4 days. physical Therapy TREATMENT  Patient: Shelley Moeller (39 y.o. female)  Date: 3/7/2018  Precautions: Back  Chart, physical therapy assessment, plan of care and goals were reviewed. ASSESSMENT:  Patient received resting in bed, agreeable to PT. Patient able to recite 3/3 back precautions. Patient required min A for bed mobility via log roll. Patient required min A for sit <> stand and RW although twisting with transfer, requiring verbal cues to adhere to precautions. Patient ambulated 75 feet with CGA-min A and RW. Patient with one instance of RLE buckling, although patient able to control. Patient left sitting in the chair with her brace on at the conclusion of PT treatment session. Patient continues to be a good candidate for IP rehab at discharge. Progression toward goals:  [x]      Improving appropriately and progressing toward goals  []      Improving slowly and progressing toward goals  []      Not making progress toward goals and plan of care will be adjusted     PLAN:  Patient continues to benefit from skilled intervention to address the above impairments. Continue treatment per established plan of care.   Discharge Recommendations:  Inpatient Rehab  Further Equipment Recommendations for Discharge:  TBD     SUBJECTIVE:   Patient stated The right leg is hurting today.    The patient stated 3/3 back precautions. Reviewed all 3 with patient. OBJECTIVE DATA SUMMARY:   Functional Mobility Training:  Bed Mobility:  Log Rolling: Contact guard assistance  Supine to Sit: Minimum assistance     Scooting: Contact guard assistance        Brace donned with  moderate assistance    Transfers:  Sit to Stand: Minimum assistance  Stand to Sit: Contact guard assistance        Bed to Chair: Contact guard assistance                    Ambulation/Gait Training:  Distance (ft): 75 Feet (ft)  Assistive Device: Gait belt;Walker, rolling  Ambulation - Level of Assistance: Contact guard assistance;Minimal assistance        Gait Abnormalities: Antalgic;Decreased step clearance        Base of Support: Widened     Speed/Stacy: Pace decreased (<100 feet/min)  Step Length: Right shortened;Left shortened                    Stairs: Therapeutic Exercises:     Pain:  Pain Scale 1: Numeric (0 - 10)  Pain Intensity 1: 0              Activity Tolerance:   Fair. Please refer to the flowsheet for vital signs taken during this treatment.   After treatment:   [x]  Patient left in no apparent distress sitting up in chair  []  Patient left in no apparent distress in bed  [x]  Call bell left within reach  [x]  Nursing notified  [x]  Caregiver present  []  Bed alarm activated    COMMUNICATION/COLLABORATION:   The patients plan of care was discussed with: Registered Nurse and     Nani Recinos, PT, DPT   Time Calculation: 24 mins

## 2018-03-07 NOTE — CONSULTS
Jean Claude 85  MR#: [id number]  : 1970  ACCOUNT #: [de-identified]   DATE OF SERVICE: 2018    The patient is postop day 1 from a lumbar decompression and fusion, we decompressed the left side as well as most of her symptoms; however, postoperatively she began complaining of pain on the right side with right-sided radiculopathy. That area was not decompressed directly, but it was decompressed through indirect decompression. I was concerned from the lateral cage that there was a possibility of free fragment of disk being pushed into the right side given that she is having worsening radicular symptoms, and we decided to proceed with a decompression of the left side today rather than waiting and see how her symptoms progress over the next few weeks. I have given her warnings about the possible complications including but not limited to pain, scar, bleeding, infection, nonunion, damage to surrounding structures, death, paralysis, blindness, stroke. She understands and wants to proceed. We will go ahead and proceed with that today.       MD LILIAN aZcarias / [place]  D: 2018 21:47     T: 2018 22:56  JOB #: [id number]

## 2018-03-07 NOTE — PROGRESS NOTES
Problem: Self Care Deficits Care Plan (Adult)  Goal: *Acute Goals and Plan of Care (Insert Text)  Occupational Therapy Goals  Re-eval 3/7/2018 goals remain the same, and area still appropriate for pt  Initiated 3/6/2018    1. Patient will perform lower body dressing with moderate assistance  using Reacher PRN within 7 days. 2.  Patient will perform toileting with supervision/set-up using most appropriate DME within 7 days. 3.  Patient will bathing at minimal assistance/contact guard assist within 7 days. 4.  Patient will don/doff back brace at supervision/set-up within 7 days. 5.  Patient will verbalize/demonstrate 3/3 back precautions during ADL tasks without cues within 7 days. Occupational Therapy ReEVALUATION  Patient: Anastasiia Sharma (40 y.o. female)  Date: 3/7/2018  Diagnosis: LUMBAGO, RADICULOPATHY, STENOSIS, SPONDELOLITHISIS  Spondylisthesis  LUMBAR S/P lumbar spinal fusion  Procedure(s) (LRB):  L4-5 Decompression (N/A) 1 Day Post-Op  Precautions: Back    ASSESSMENT :  Based on the objective data described below, the patient presents with generalized weakness, decreased endurance, strength, functional mobility, and ADLs. Pt was living with family and was independent prior to first surgery and was eval and needed assist after surgery. Pt had second back surgery 3/6/2018 and at re-eval pt was cleared to be seen and all VSS. Pt was able to state 3/3 of back precautions, and remembered how to log roll. She was able to come to sit with min assist of 2. Pt was min assist to vasile her back brace in sitting. She was min to CGA to come to stand and ellen to transfer to chair with CGA and able to walk with no right leg pain. Pt was min assist for tub transfer and she was anxious with transfer. She will benefit from grab bar and tub seat for shower at this time. Pt was left sitting in chair with call bell with in reach and daughter was in room.   Recommend that pt have further therapy at discharge at In pt rehab. Spoke to pt about rehab and she agreed that she feel unsafe and too weak to go home. Patient will benefit from skilled intervention to address the above impairments. Patients rehabilitation potential is considered to be Good  Factors which may influence rehabilitation potential include:   [x]                None noted  []                Mental ability/status  []                Medical condition  []                Home/family situation and support systems  []                Safety awareness  []                Pain tolerance/management  []                Other:      PLAN :  Recommendations and Planned Interventions:  [x]                  Self Care Training                  []           Therapeutic Activities  [x]                  Functional Mobility Training    []           Cognitive Retraining  [x]                  Therapeutic Exercises           [x]           Endurance Activities  []                  Balance Training                   []           Neuromuscular Re-Education  []                  Visual/Perceptual Training     [x]      Home Safety Training  [x]                  Patient Education                 [x]           Family Training/Education  []                  Other (comment):    Frequency/Duration: Patient will be followed by occupational therapy 4 times a week to address goals. Discharge Recommendations: Inpatient Rehab  Further Equipment Recommendations for Discharge: tbd     SUBJECTIVE:   Patient stated My arms feel so weak and so do my legs. Kadie Hannon    OBJECTIVE DATA SUMMARY:   Hospital course since last seen and reason for reevaluation: pt was seen for eval after back surgery and was having increased pain in right leg and not able to work with therapy for any length of time. Pt has another back surgery last evening, and was doing much better this am and her right leg was not hurting as bad and her endurance was better.   Pt remains weak and at this time would benefit from In pt rehab prior to returning home. Cognitive/Behavioral Status:  Neurologic State: Alert  Orientation Level: Oriented X4  Cognition: Appropriate decision making           Skin: in good health   Edema: none noted  Vision/Perceptual:                 intact                    Range of Motion:  functional range in BUE            Strength:  Functional in BUE but noted to have B tremors when she is using her arms or reaching with arms                 Coordination:   grossly intact          Tone & Sensation:  intact        Balance:  Sitting: Intact; Without support  Standing: Impaired; With support  Standing - Static: Fair  Standing - Dynamic : Fair    Functional Mobility and Transfers for ADLs:  Bed Mobility:  Rolling: Contact guard assistance  Supine to Sit: Contact guard assistance  Scooting: Contact guard assistance    Transfers:  Functional Transfers  Sit to Stand: Minimum assistance;Contact guard assistance  Stand to Sit: Contact guard assistance  Bed to Chair: Contact guard assistance    ADL Assessment:     Pt is max assist for ADLs at this time. Functional Measure:  Barthel Index:    Bathin  Bladder: 10  Bowels: 10  Groomin  Dressin  Feeding: 10  Mobility: 10  Stairs: 0  Toilet Use: 5  Transfer (Bed to Chair and Back): 10  Total: 65       Barthel and G-code impairment scale:  Percentage of impairment CH  0% CI  1-19% CJ  20-39% CK  40-59% CL  60-79% CM  80-99% CN  100%   Barthel Score 0-100 100 99-80 79-60 59-40 20-39 1-19   0   Barthel Score 0-20 20 17-19 13-16 9-12 5-8 1-4 0      The Barthel ADL Index: Guidelines  1. The index should be used as a record of what a patient does, not as a record of what a patient could do. 2. The main aim is to establish degree of independence from any help, physical or verbal, however minor and for whatever reason. 3. The need for supervision renders the patient not independent. 4. A patient's performance should be established using the best available evidence.  Asking the patient, friends/relatives and nurses are the usual sources, but direct observation and common sense are also important. However direct testing is not needed. 5. Usually the patient's performance over the preceding 24-48 hours is important, but occasionally longer periods will be relevant. 6. Middle categories imply that the patient supplies over 50 per cent of the effort. 7. Use of aids to be independent is allowed. Timmy Srinivasan., Barthel, D.W. (1281). Functional evaluation: the Barthel Index. [facility] (14)2. Zina Marie brock DESTINY Peterson, Funmi Styles., Luis Nassar., [place], [address] (1999). Measuring the change indisability after inpatient rehabilitation; comparison of the responsiveness of the Barthel Index and Functional Allison Measure. Journal of Neurology, Neurosurgery, and Psychiatry, 66(4), 748-373. CHRISTOFER Johnson, LURDES Muniz, & Norma Sanon, M.A. (2004.) Assessment of post-stroke quality of life in cost-effectiveness studies: The usefulness of the Barthel Index and the EuroQoL-5D. Quality of Life Research, 13, 971-02         G codes: In compliance with CMSs Claims Based Outcome Reporting, the following G-code set was chosen for this patient based on their primary functional limitation being treated: The outcome measure chosen to determine the severity of the functional limitation was the Barthel with a score of 65/100 which was correlated with the impairment scale. ?  Self Care:     - CURRENT STATUS: CJ - 20%-39% impaired, limited or restricted    - GOAL STATUS: CI - 1%-19% impaired, limited or restricted    - D/C STATUS:  ---------------To be determined---------------     Occupational Therapy Evaluation Charge Determination   History Examination Decision-Making   MEDIUM Complexity : Expanded review of history including physical, cognitive and psychosocial  history  MEDIUM Complexity : 3-5 performance deficits relating to physical, cognitive , or psychosocial skils that result in activity limitations and / or participation restrictions MEDIUM Complexity : Patient may present with comorbidities that affect occupational performnce. Miniml to moderate modification of tasks or assistance (eg, physical or verbal ) with assesment(s) is necessary to enable patient to complete evaluation       Based on the above components, the patient evaluation is determined to be of the following complexity level: MEDIUM  Pain:  Pain Scale 1: Numeric (0 - 10)  Pain Intensity 1: 0              Activity Tolerance:   vss  Please refer to the flowsheet for vital signs taken during this treatment. After treatment:   [x] Patient left in no apparent distress sitting up in chair  [] Patient left in no apparent distress in bed  [x] Call bell left within reach  [x] Nursing notified  [x] Caregiver present  [] Bed alarm activated    COMMUNICATION/EDUCATION:   The patients plan of care was discussed with: Physical Therapist and Registered Nurse. [x]    Home safety education was provided and the patient/caregiver indicated understanding. [x]    Patient/family have participated as able in goal setting and plan of care. [x]    Patient/family agree to work toward stated goals and plan of care. []    Patient understands intent and goals of therapy, but is neutral about his/her participation. []    Patient is unable to participate in goal setting and plan of care. This patients plan of care is appropriate for delegation to [facility].     Thank you for this referral.  Ole Coleman OT  Time Calculation: 40 mins

## 2018-03-07 NOTE — PROGRESS NOTES
Bedside and Verbal shift change report given to Jessica العلي (oncoming nurse) by Ernesto Ling (offgoing nurse). Report included the following information SBAR, Kardex, Procedure Summary, Intake/Output, MAR, Accordion, Recent Results and Med Rec Status.

## 2018-03-07 NOTE — OP NOTES
[facility] CARE OP NOTE    Nataliya yKle  MR#: [id number]  : 1970  ACCOUNT #: [de-identified]   DATE OF SERVICE: 2018    PREOPERATIVE DIAGNOSES:  1. Spinal stenosis, L4-L5. 2.  Right lower extremity sciatica. 3.  Lumbago. 4.  Obesity with a BMI of 49. POSTOPERATIVE DIAGNOSES:  1. Spinal stenosis, L4-L5. 2.  Right lower extremity sciatica. 3.  Lumbago. 4.  Obesity with a BMI of 49. PROCEDURES PERFORMED:  L4-L5 right-sided laminectomy. Please note a modifier 22 was used as this took 50% longer to complete due to the patient's large body habitus and a BMI of 49. SURGEON:  Sadie Wylie MD     ASSISTANT:  OR staff. IMPLANTS:  None. SPECIMENS REMOVED:  None. ANESTHESIA:  General.    ESTIMATED BLOOD LOSS:  See brief op note     DRAINS:  None. INDICATION FOR PROCEDURE:  The patient is a pleasant 29-year-old lady status post L4-L5 decompression and fusion yesterday, presenting with new right-sided symptoms that are new since yesterday. After discussing with her today, we decided that it was prudent to come in and perform a right-sided decompression given that the incision was fresh and there was no scar tissue in that area at this time, she agreed. We gave her warnings about possible complications including but not limited to pain, scar, bleeding, infection, nonunion, damage to surrounding structures, death, paralysis, blindness, stroke. She understood and wanted to proceed. DESCRIPTION OF PROCEDURE:  After informed consent was obtained and the operative site was properly marked, the patient was taken to the operating room and underwent general endotracheal anesthesia. She was positioned prone on the operating room table using Proenza Schouer Bonner table 4posterior frame. Arms were placed in the 90/90 position, knees were gently with pillows. Fluoroscopy was used to angelic the level of the incision. We then proceeded to prep and drape in usual manner. Timeout was obtained verifying that this is the correct patient, the correct surgery, the correct site as well as that she had received IV antibiotics within 30 minutes of the incision. I then proceeded to perform a standard posterior approach to the lumbar spine exposing the right side at L4-L5. Once that area was exposed and hemostasis obtained, fluoroscopy was used to verify that we were in the correct level. We then used the Midas Emmanuel, performed a U cut laminectomy at L4 and a jig cut at L5. I removed intervening bone with a pituitary and then detached the ligament flavum from the superior leading edge with a curved curette, flipped into the bony defect and removed with a Kerrison #3, followed by Kerrison #4 fully decompressing the thecal sac centrally and the nerve root in the lateral recesses as I followed it around the pedicle into the distal foramen. Once that area was fully decompressed and we were satisfied, I also inspected the exiting nerve root by performing a foraminotomy and removing the superior articular process of the facet at L4-L5 freeing the L4 nerve root. Once this was completed, I irrigated the wound with 3 liters of normal saline. I closed the fascia with #1 Vicryl figure-of-eight interrupted sutures. Irrigated subcutaneous, closed subcutaneous fat with V-Loc and subcutaneous with 2-0 Vicryl. The skin was closed with staples and a sterile dressing was applied. The patient was then awakened and transferred to the PACU in stable condition. POSTOPERATIVE PLAN:  The patient is going to remain here overnight. We are going to give SCDs and RON hose for DVT prophylaxis, and Ancef for infection prophylaxis.       MD LILIAN Torres / [place]  D: 03/06/2018 23:18     T: 03/07/2018 01:17  JOB #: [id number]  CC: Deanna GARCIA MD  CC: Alin Renteria MD

## 2018-03-07 NOTE — PERIOP NOTES
TRANSFER - OUT REPORT:    Verbal report given to Valentina GREER(name) on Justine Kim  being transferred to [facility](unit) for routine post - op       Report consisted of patients Situation, Background, Assessment and   Recommendations(SBAR). Information from the following report(s) SBAR, OR Summary, Intake/Output, MAR and Recent Results was reviewed with the receiving nurse. Opportunity for questions and clarification was provided.       Patient transported with:   O2 @ 2 liters  Registered Nurse

## 2018-03-07 NOTE — PROGRESS NOTES
Problem: Mobility Impaired (Adult and Pediatric)  Goal: *Acute Goals and Plan of Care (Insert Text)  Physical Therapy Goals  Initiated 3/6/2018    1. Patient will move from supine to sit and sit to supine , scoot up and down and roll side to side in bed with independence within 4 days. 2. Patient will perform sit to stand with modified independence within 4 days. 3. Patient will ambulate with modified independence for 150 feet with the least restrictive device within 4 days. 4. Patient will ascend/descend 4 stairs with one sided handrail(s) with modified independence within 4 days. 5. Patient will verbalize and demonstrate understanding of spinal precautions (No bending, lifting greater than 5 lbs, or twisting; log-roll technique; frequent repositioning as instructed) within 4 days. physical Therapy REEVALUATION  Patient: Juan David Warner (40 y.o. female)  Date: 3/7/2018  Primary Diagnosis: LUMBAGO, RADICULOPATHY, STENOSIS, SPONDELOLITHISIS  Spondylisthesis  LUMBAR  Procedure(s) (LRB):  L4-5 Decompression (N/A) 1 Day Post-Op   Precautions:   Back  Chart, physical therapy assessment, plan of care and goals were reviewed. ASSESSMENT :  Patient received resting in bed, agreeable and cleared for PT. Patient underwent L4-5 decompression yesterday. Patient required CGA for bed mobility via log roll with verbal cues to adhere to precautions. Patient able to teach-back back precautions. Patient required min-mod A for donning back brace. Patient required CGA-min A for sit <> stand requiring constant verbal cues for hand placement for safety. Patient ambulated 75 feet with CGA and RW. Patient with slow gait speed and decreased step length, fairly unsteady. Patient left sitting in the chair at the conclusion of PT re-evaluation. Discussed with team and patient and patient will benefit from IP rehab at discharge due to decreased functional mobility and repeat surgery.       Patient will benefit from skilled intervention to address the above impairments. Patients rehabilitation potential is considered to be Fair  Factors which may influence rehabilitation potential include:   []           None noted  []           Mental ability/status  []           Medical condition  []           Home/family situation and support systems  []           Safety awareness  [x]           Pain tolerance/management  []           Other:      PLAN :  Recommendations and Planned Interventions:  [x]             Bed Mobility Training             []      Neuromuscular Re-Education  [x]             Transfer Training                   []      Orthotic/Prosthetic Training  [x]             Gait Training                         []      Modalities  [x]             Therapeutic Exercises           []      Edema Management/Control  [x]             Therapeutic Activities            [x]      Patient and Family Training/Education  []             Other (comment):  Frequency/Duration: Patient will be followed by physical therapy twice daily to address goals. Discharge Recommendations: Inpatient Rehab  Further Equipment Recommendations for Discharge: TBD     SUBJECTIVE:   Patient stated My right leg isn't as bad as yesterday.     OBJECTIVE DATA SUMMARY:     Past Medical History:   Diagnosis Date    [facility] [address]     Dissociative identity disorder     Hyperlipidemia     Hypertension     Morbid obesity ([facility] [address]     NIDDM (non-insulin dependent diabetes mellitus)     Psychiatric disorder     Depression/Anxiety    PTSD (post-traumatic stress disorder)      Past Surgical History:   Procedure Laterality Date    DELIVERY       HX BACK SURGERY      HX BREAST BIOPSY Left     u/s benign biopsy 3 years ago    HX CHOLECYSTECTOMY      HX TONSILLECTOMY      HX TUBAL LIGATION      CO REPAIR [address] course since last seen and reason for reevaluation: Patient underwent L4-5 decompression yesterday. Critical Behavior:  Neurologic State: Alert  Orientation Level: Oriented X4  Cognition: Appropriate decision making          Functional Mobility:  Bed Mobility:  Rolling: Contact guard assistance  Supine to Sit: Contact guard assistance     Scooting: Contact guard assistance  Transfers:  Sit to Stand: Minimum assistance;Contact guard assistance  Stand to Sit: Contact guard assistance        Bed to Chair: Contact guard assistance           Balance:   Sitting: Intact; Without support  Standing: Impaired; With support  Standing - Static: Fair  Standing - Dynamic : Fair  Ambulation/Gait Training:  Distance (ft): 75 Feet (ft)  Assistive Device: Gait belt;Walker, rolling  Ambulation - Level of Assistance: Minimal assistance;Contact guard assistance        Gait Abnormalities: Antalgic;Decreased step clearance        Base of Support: Widened     Speed/Stacy: Slow  Step Length: Right shortened;Left shortened            Pain:  Pain Scale 1: Numeric (0 - 10)  Pain Intensity 1: 0  Pain Location 1: Leg  Pain Orientation 1: Right;Upper  Pain Description 1: Constant; Sharp  Pain Intervention(s) 1: Rest  Activity Tolerance:   Fair, increased pain. Please refer to the flowsheet for vital signs taken during this treatment. After treatment:   [x]  Patient left in no apparent distress sitting up in chair  []  Patient left in no apparent distress in bed  [x]  Call bell left within reach  [x]  Nursing notified  [x]  Caregiver present  []  Bed alarm activated    COMMUNICATION/EDUCATION:   The patients plan of care was discussed with: Occupational Therapist, Registered Nurse and . [x]  Fall prevention education was provided and the patient/caregiver indicated understanding. [x]  Patient/family have participated as able in goal setting and plan of care. [x]  Patient/family agree to work toward stated goals and plan of care.   []  Patient understands intent and goals of therapy, but is neutral about his/her participation. []  Patient is unable to participate in goal setting and plan of care.     Thank you for this referral.  Carlin Schmitz, PT, DPT   Time Calculation: 38 mins

## 2018-03-08 VITALS
BODY MASS INDEX: 49.13 KG/M2 | WEIGHT: 266.98 LBS | RESPIRATION RATE: 18 BRPM | HEIGHT: 62 IN | HEART RATE: 81 BPM | SYSTOLIC BLOOD PRESSURE: 142 MMHG | TEMPERATURE: 98.2 F | OXYGEN SATURATION: 100 % | DIASTOLIC BLOOD PRESSURE: 78 MMHG

## 2018-03-08 LAB
GLUCOSE BLD STRIP.AUTO-MCNC: 178 MG/DL (ref 65–100)
GLUCOSE BLD STRIP.AUTO-MCNC: 93 MG/DL (ref 65–100)
HGB BLD-MCNC: 9.3 G/DL (ref 11.5–16)
SERVICE CMNT-IMP: ABNORMAL
SERVICE CMNT-IMP: NORMAL

## 2018-03-08 PROCEDURE — 97530 THERAPEUTIC ACTIVITIES: CPT

## 2018-03-08 PROCEDURE — 74011250637 HC RX REV CODE- 250/637: Performed by: ORTHOPAEDIC SURGERY

## 2018-03-08 PROCEDURE — 36415 COLL VENOUS BLD VENIPUNCTURE: CPT | Performed by: ORTHOPAEDIC SURGERY

## 2018-03-08 PROCEDURE — 97116 GAIT TRAINING THERAPY: CPT

## 2018-03-08 PROCEDURE — 74011250637 HC RX REV CODE- 250/637: Performed by: NURSE PRACTITIONER

## 2018-03-08 PROCEDURE — 74011250637 HC RX REV CODE- 250/637: Performed by: PHYSICIAN ASSISTANT

## 2018-03-08 PROCEDURE — 74011636637 HC RX REV CODE- 636/637: Performed by: ORTHOPAEDIC SURGERY

## 2018-03-08 PROCEDURE — 74011636637 HC RX REV CODE- 636/637: Performed by: NURSE PRACTITIONER

## 2018-03-08 PROCEDURE — 85018 HEMOGLOBIN: CPT | Performed by: ORTHOPAEDIC SURGERY

## 2018-03-08 PROCEDURE — 97535 SELF CARE MNGMENT TRAINING: CPT

## 2018-03-08 PROCEDURE — 82962 GLUCOSE BLOOD TEST: CPT

## 2018-03-08 RX ORDER — METFORMIN HYDROCHLORIDE 500 MG/1
1000 TABLET ORAL 2 TIMES DAILY WITH MEALS
Status: DISCONTINUED | OUTPATIENT
Start: 2018-03-08 | End: 2018-03-08 | Stop reason: HOSPADM

## 2018-03-08 RX ORDER — GABAPENTIN 300 MG/1
300 CAPSULE ORAL 3 TIMES DAILY
Status: DISCONTINUED | OUTPATIENT
Start: 2018-03-08 | End: 2018-03-08 | Stop reason: HOSPADM

## 2018-03-08 RX ORDER — LISINOPRIL 20 MG/1
20 TABLET ORAL DAILY
Status: DISCONTINUED | OUTPATIENT
Start: 2018-03-08 | End: 2018-03-08 | Stop reason: HOSPADM

## 2018-03-08 RX ADMIN — CLONAZEPAM 0.5 MG: 0.5 TABLET ORAL at 12:18

## 2018-03-08 RX ADMIN — ACETAMINOPHEN 1000 MG: 500 TABLET ORAL at 05:44

## 2018-03-08 RX ADMIN — ACETAMINOPHEN 1000 MG: 500 TABLET ORAL at 12:18

## 2018-03-08 RX ADMIN — Medication 10 ML: at 05:44

## 2018-03-08 RX ADMIN — DOCUSATE SODIUM AND SENNOSIDES 1 TABLET: 8.6; 5 TABLET, FILM COATED ORAL at 08:15

## 2018-03-08 RX ADMIN — OXYCODONE HYDROCHLORIDE 15 MG: 5 TABLET ORAL at 12:18

## 2018-03-08 RX ADMIN — INSULIN LISPRO 4 UNITS: 100 INJECTION, SOLUTION INTRAVENOUS; SUBCUTANEOUS at 08:16

## 2018-03-08 RX ADMIN — FAMOTIDINE 20 MG: 20 TABLET, FILM COATED ORAL at 08:15

## 2018-03-08 RX ADMIN — CLONAZEPAM 1 MG: 1 TABLET ORAL at 08:15

## 2018-03-08 RX ADMIN — LISINOPRIL 20 MG: 20 TABLET ORAL at 09:02

## 2018-03-08 RX ADMIN — ATORVASTATIN CALCIUM 40 MG: 40 TABLET, FILM COATED ORAL at 08:14

## 2018-03-08 RX ADMIN — OXCARBAZEPINE 300 MG: 150 TABLET ORAL at 08:14

## 2018-03-08 RX ADMIN — OXYCODONE HYDROCHLORIDE 15 MG: 5 TABLET ORAL at 05:44

## 2018-03-08 RX ADMIN — GABAPENTIN 300 MG: 300 CAPSULE ORAL at 08:14

## 2018-03-08 RX ADMIN — METFORMIN HYDROCHLORIDE 1000 MG: 500 TABLET, FILM COATED ORAL at 08:24

## 2018-03-08 RX ADMIN — LAMOTRIGINE 50 MG: 25 TABLET ORAL at 08:15

## 2018-03-08 RX ADMIN — INSULIN LISPRO 8 UNITS: 100 INJECTION, SOLUTION INTRAVENOUS; SUBCUTANEOUS at 08:15

## 2018-03-08 RX ADMIN — OXYCODONE HYDROCHLORIDE 15 MG: 5 TABLET ORAL at 09:02

## 2018-03-08 RX ADMIN — POLYETHYLENE GLYCOL 3350 17 G: 17 POWDER, FOR SOLUTION ORAL at 08:14

## 2018-03-08 NOTE — PROGRESS NOTES
ORTHO POST OP SPINE PROGRESS NOTE    2018  Admit Date: 3/5/2018  Admit Diagnosis: LUMBAGO, RADICULOPATHY, STENOSIS, SPONDELOLITHISIS  Spondylisthesis  LUMBAR  Procedure: Procedure(s):  L4-5 Decompression  Post Op day: 2 Days Post-Op    Subjective:     Jay Jay Biswas is a patient who has complaints of mild LBP, RLE pain worse in lower leg POD #2 s/p R L4-5 decompression POD#3 s/p L4-5 posterior and lateral fusion. PT rec rehab although ins likely denial. pt would like to avoid going to snf. family at bedside. .     Review of Systems: Pertinent items are noted in HPI. Objective:     PT/OT:   Distance Ambulated:           Time Ambulated (min):        Ambulation Response: Activity Response: Other (Comment)  Assistive Device:              Assistive Device: Fall prevention device    Vital Signs:    Blood pressure (!) 159/97, pulse 79, temperature 98.2 °F (36.8 °C), resp. rate 18, height 5' 2\" (1.575 m), weight 121.1 kg (266 lb 15.6 oz), last menstrual period 2018, SpO2 95 %. Temp (24hrs), Av.2 °F (36.8 °C), Min:97.8 °F (36.6 °C), Max:98.6 °F (37 °C)          1901 -  0700  In: 9695 [P.O.:640; I.V.:850]  Out: 275 [Urine:250]    LAB:    Recent Labs      18   0256   HGB  9.3*       Wound/Drain Assessment:  Drain:      Dressing:     Physical Exam:  Neurological: no deficit  Incision clean, dry, and intact  5/5 BLE    Assessment:      Patient Active Problem List   Diagnosis Code    Bipolar disorder, mixed ([facility] [address] F31.60    Hyponatremia E87.1    Spondylisthesis M43.10    S/P lumbar spinal fusion Z98.1       Plan:     Continue PT/OT/Rehab  Discontinue:  Consult: PT  and OT    Discharge To: snf v home health.  May be able to go home if she is able to progress with PT/OT   incr gabapentin to 300 TID    Signed By: GENE Mayo

## 2018-03-08 NOTE — ROUTINE PROCESS
Bedside and Verbal shift change report given to [address] (oncoming nurse) by Bárbara Gomez RN (offgoing nurse). Report included the following information SBAR, Kardex, Intake/Output, MAR, Recent Results and Med Rec Status.

## 2018-03-08 NOTE — PROGRESS NOTES
Ortho NP Note:    Pt see sitting up in chair,  at bedside. Pt states pain is better today and she is smiling  VSS except HTN  Reviewed home med list and Lisnopril and Metformin were both not yet resumed. Discussed with Frances Lund in pharmacy and will restart those today. Glucose 140-177's  + void and tolerating PO  Will have PT/OT work with her again this morning. Insurance denies Inpatient Rehab. Pt does not want to go to [facility] she will progress enough with PT today to go home,.     Guido Gill, NP

## 2018-03-08 NOTE — PROGRESS NOTES
Problem: Self Care Deficits Care Plan (Adult)  Goal: *Acute Goals and Plan of Care (Insert Text)  Occupational Therapy Goals  Re-eval 3/7/2018 goals remain the same, and area still appropriate for pt  Initiated 3/6/2018    1. Patient will perform lower body dressing with moderate assistance  using Reacher PRN within 7 days. met 3/8/2018    2. Patient will perform toileting with supervision/set-up using most appropriate DME within 7 days. Met 3/8/2018     3. Patient will bathing at minimal assistance/contact guard assist within 7 days. 4.  Patient will don/doff back brace at supervision/set-up within 7 days. Met 3/8/2018    5. Patient will verbalize/demonstrate 3/3 back precautions during ADL tasks without cues within 7 days. Occupational Therapy TREATMENT  Patient: Sahara Sahu (10 y.o. female)  Date: 3/8/2018  Diagnosis: LUMBAGO, RADICULOPATHY, STENOSIS, SPONDELOLITHISIS  Spondylisthesis  LUMBAR S/P lumbar spinal fusion  Procedure(s) (LRB):  L4-5 Decompression (N/A) 2 Days Post-Op  Precautions: Back  Chart, occupational therapy assessment, plan of care, and goals were reviewed. ASSESSMENT:  Pt was supine in bed and was cleared to be seen for therapy and plans to go home with family and home care OT and PT today. She was issued hip kit and instructed on use of dressing stick and reacher with min assist to pull pants up over her buttocks due to pants are tight. Pt was able to vasile socks with use of sock aide and she was educated on how to clean buttocks after BM with out twisting. Pt is making good progress and is min assist ot CGA for bed mobility for sit to supine. Recommend that pt return home with family and home care OT and PT, pt is very nervous with transfer to and from tub.   Progression toward goals:  [x]       Improving appropriately and progressing toward goals  []       Improving slowly and progressing toward goals  []       Not making progress toward goals and plan of care will be adjusted     PLAN:  Patient continues to benefit from skilled intervention to address the above impairments. Continue treatment per established plan of care. Discharge Recommendations:  Home Health  Further Equipment Recommendations for Discharge:  tbd     SUBJECTIVE:   Patient stated  The tub makes me nervous.     OBJECTIVE DATA SUMMARY:   Cognitive/Behavioral Status:  Neurologic State: Alert  Orientation Level: Appropriate for age  Cognition: Appropriate decision making  Perception: Appears intact  Perseveration: No perseveration noted  Safety/Judgement: Awareness of environment    Functional Mobility and Transfers for ADLs:  Bed Mobility:  Rolling: Contact guard assistance  Supine to Sit: Contact guard assistance  Sit to Supine: Minimum assistance  Scooting: Contact guard assistance    Transfers:  Sit to Stand: Contact guard assistance       Balance:  Sitting: Intact; Without support  Standing: Intact; With support  Standing - Static: Good  Standing - Dynamic : Good    ADL Intervention:  Feeding  Feeding Assistance: Independent                   Upper Body Dressing Assistance  Dressing Assistance: Minimum assistance  Bra: Minimum assistance    Lower Body Dressing Assistance  Dressing Assistance: Minimum assistance  Underpants: Minimum assistance  Pants With Elastic Waist: Minimum assistance  Socks: Minimum assistance    Toileting  Toileting Assistance: Contact guard assistance  Bladder Hygiene: Contact guard assistance  Bowel Hygiene: Minimum assistance;Contact guard assistance    Cognitive Retraining  Safety/Judgement: Awareness of environment      Pain:  Pain Scale 1: Numeric (0 - 10)  Pain Intensity 1: 10  Pain Location 1: Leg     Pain Description 1: Aching  Pain Intervention(s) 1: Medication (see MAR)  Activity Tolerance:   vss  Please refer to the flowsheet for vital signs taken during this treatment.   After treatment:   [] Patient left in no apparent distress sitting up in chair  [x] Patient left in no apparent distress in bed  [x] Call bell left within reach  [x] Nursing notified  [x] Caregiver present  [x] Bed alarm activated    COMMUNICATION/COLLABORATION:   The patients plan of care was discussed with: Physical Therapist and nursing    Marty Everett OT  Time Calculation: 36 mins

## 2018-03-08 NOTE — PROGRESS NOTES
Problem: Mobility Impaired (Adult and Pediatric)  Goal: *Acute Goals and Plan of Care (Insert Text)  Physical Therapy Goals  Initiated 3/6/2018    1. Patient will move from supine to sit and sit to supine , scoot up and down and roll side to side in bed with independence within 4 days. 2. Patient will perform sit to stand with modified independence within 4 days. 3. Patient will ambulate with modified independence for 150 feet with the least restrictive device within 4 days. 4. Patient will ascend/descend 4 stairs with one sided handrail(s) with modified independence within 4 days. 5. Patient will verbalize and demonstrate understanding of spinal precautions (No bending, lifting greater than 5 lbs, or twisting; log-roll technique; frequent repositioning as instructed) within 4 days. physical Therapy TREATMENT  Patient: Grace Raphael (87 y.o. female)  Date: 3/8/2018  Diagnosis: LUMBAGO, RADICULOPATHY, STENOSIS, SPONDELOLITHISIS  Spondylisthesis  LUMBAR S/P lumbar spinal fusion  Procedure(s) (LRB):  L4-5 Decompression (N/A) 2 Days Post-Op  Precautions: Back  Chart, physical therapy assessment, plan of care and goals were reviewed. ASSESSMENT:  Patient received resting in bed, agreeable to PT. Patient required CGA for bed mobility. Patient required min A and verbal cues for donning back brace. Patient required CGA for sit <> stand with RW and verbal cues to inhibit twisting with transfer which improved throughout session. Patient has made great progress since yesterday. Patient ambulated 300 feet with CGA and RW. Patient appears in better spirits, more alert and interactive with PT session. Patient negotiated 4 stairs with CGA and BHR with no concerns. Performed car transfer with mod A for BLE management to enter car, although CGA to exit. Patient returned to supine with min A for LE management.  present throughout session and attentive to education.  Patient is cleared by PT/OT standpoint for discharge. Recommending HH PT/OT and RW at discharge. Progression toward goals:  [x]      Improving appropriately and progressing toward goals  []      Improving slowly and progressing toward goals  []      Not making progress toward goals and plan of care will be adjusted     PLAN:  Patient continues to benefit from skilled intervention to address the above impairments. Continue treatment per established plan of care. Discharge Recommendations:  Home Health  Further Equipment Recommendations for Discharge:  rolling walker     SUBJECTIVE:   Patient stated I want to get out of here.    The patient stated 3/3 back precautions. Reviewed all 3 with patient. OBJECTIVE DATA SUMMARY:   Critical Behavior:  Neurologic State: Alert  Orientation Level: Oriented X4  Cognition: Appropriate safety awareness, Appropriate for age attention/concentration, Appropriate decision making     Functional Mobility Training:  Bed Mobility:  Log Rolling: Contact guard assistance  Supine to Sit: Contact guard assistance  Sit to Supine: Minimum assistance  Scooting: Contact guard assistance        Brace donned with  minimal assistance  Transfers:  Sit to Stand: Contact guard assistance  Stand to Sit: Contact guard assistance                             Balance:  Sitting: Intact; Without support  Standing: Intact; With support  Standing - Static: Good  Standing - Dynamic : Good  Ambulation/Gait Training:  Distance (ft): 300 Feet (ft)  Assistive Device: Gait belt;Walker, rolling  Ambulation - Level of Assistance: Modified independent;Supervision        Gait Abnormalities: Antalgic;Decreased step clearance        Base of Support: Widened     Speed/Stacy: Pace decreased (<100 feet/min)  Step Length: Left shortened;Right shortened                  Patient performed car transfer utilizing car simulation device with mod A for BLE management entering the car, CGA for exiting car with minimal verbal cues.  This was achieved by prior visual and verbal teaching on correct technique while maintaining pertinent TJR precautions as applicable. Device set at approximate height of patient's vehicle to be utilized at discharge. Stairs:  Number of Stairs Trained: 4  Stairs - Level of Assistance: Contact guard assistance  Rail Use: Both  Therapeutic Exercises:     Pain:  Pain Scale 1: Numeric (0 - 10)  Pain Intensity 1: 7  Pain Location 1: Abdomen     Pain Description 1: Aching  Pain Intervention(s) 1: Medication (see MAR)  Activity Tolerance:   Improving, pain controlled. Please refer to the flowsheet for vital signs taken during this treatment.   After treatment:   []  Patient left in no apparent distress sitting up in chair  [x]  Patient left in no apparent distress in bed  [x]  Call bell left within reach  [x]  Nursing notified  [x]  Caregiver present  []  Bed alarm activated    COMMUNICATION/COLLABORATION:   The patients plan of care was discussed with: Occupational Therapist, Registered Nurse and     Nani Hogan, PT, DPT   Time Calculation: 24 mins

## 2018-03-08 NOTE — PROGRESS NOTES
SW informed by PT pt cleared to go home at discharge with [facility] and will need a RW. SW met with pt to discuss [facility] options, FOC given and pt chose [facility]. FOC completed and on pt AVS.  Referral sent to [facility] and [place]. Both have accepted. All info entered on pt AVS.  Pt spouse to transport at discharge today. Floor nurse informed pt ready from CM standpoint. Care Management Interventions  PCP Verified by CM: Yes  Mode of Transport at Discharge:  Other (see comment) (TBD)  Transition of Care Consult (CM Consult): [address]: No  Reason Outside [place]: Physician referred to specific agency  Physical Therapy Consult: Yes  Occupational Therapy Consult: Yes  Confirm Follow Up Transport: Family (Pt was only driving short distances)  Plan discussed with Pt/Family/Caregiver: Yes  Freedom of Choice Offered: Yes  Discharge Location  Discharge Placement: Home with home health    Samantha Martinez MSW  Ext [phone]

## 2018-03-08 NOTE — PROGRESS NOTES
Problem: Falls - Risk of  Goal: *Absence of Falls  Document Hira Fall Risk and appropriate interventions in the flowsheet.    Outcome: Progressing Towards Goal  Fall Risk Interventions:  Mobility Interventions: OT consult for ADLs         Medication Interventions: Patient to call before getting OOB    Elimination Interventions: Patient to call for help with toileting needs    History of Falls Interventions: Consult care management for discharge planning

## 2018-03-08 NOTE — PROGRESS NOTES
Pharmacist Discharge Medication Reconciliation    Significant PMH:   Past Medical History:   Diagnosis Date    Bipolar affective disorder ([facility] [address]     Dissociative identity disorder     Hyperlipidemia     Hypertension     Morbid obesity ([facility] [address]     NIDDM (non-insulin dependent diabetes mellitus)     Psychiatric disorder     Depression/Anxiety    PTSD (post-traumatic stress disorder)      Chief Complaint for this Admission: No chief complaint on file. Allergies: Darvon [propoxyphene]    Discharge Medications:   Current Discharge Medication List        START taking these medications    Details   oxyCODONE IR (ROXICODONE) 5 mg immediate release tablet Take 1-2 Tabs by mouth every four (4) hours as needed. Max Daily Amount: 60 mg. One tab for mild to moderate pain level 1-6, or 2 tabs for severe pain level 7-10  Qty: 80 Tab, Refills: 0    Associated Diagnoses: S/P lumbar spinal fusion      polyethylene glycol (MIRALAX) 17 gram packet Take 1 Packet by mouth daily as needed (constipation) for up to 15 days. Qty: 15 Packet, Refills: 0      senna-docusate (PERICOLACE) 8.6-50 mg per tablet Take 1 Tab by mouth daily. Qty: 30 Tab, Refills: 0           CONTINUE these medications which have NOT CHANGED    Details   lamoTRIgine (LAMICTAL) 100 mg tablet Take 50 mg by mouth daily. OXcarbaxepine (TRILEPTAL) 600 mg tablet Take 0.5 Tabs by mouth two (2) times a day. Qty: 1 Tab, Refills: 0      atorvastatin (LIPITOR) 40 mg tablet Take 40 mg by mouth daily. baclofen (LIORESAL) 10 mg tablet Take 10 mg by mouth every evening.      gabapentin (NEURONTIN) 300 mg capsule Take 300 mg by mouth two (2) times a day. insulin lispro (HUMALOG) 100 unit/mL kwikpen 8 Units by SubCUTAneous route Before breakfast and dinner. acetaminophen (TYLENOL EXTRA STRENGTH) 500 mg tablet Take 500 mg by mouth every six (6) hours as needed for Pain. !! clonazePAM (KLONOPIN) 1 mg tablet Take 1 mg by mouth two (2) times a day. Administration Instructions:  Patient takes 1 mg in the morning and evening and 0.5 mg in the afternoon. !! clonazePAM (KLONOPIN) 1 mg tablet Take 0.5 mg by mouth daily (with lunch). Administration Instructions:  Patient takes 1 mg in the morning and evening and 0.5 mg in the afternoon. lurasidone (LATUDA) 80 mg tab tablet Take 80 mg by mouth daily. meloxicam (MOBIC) 15 mg tablet Take 15 mg by mouth daily. metFORMIN (GLUCOPHAGE) 1,000 mg tablet Take 1,000 mg by mouth two (2) times daily (with meals). multivitamin (ONE A DAY) tablet Take 1 Tab by mouth daily. lisinopril (PRINIVIL, ZESTRIL) 20 mg tablet Take 20 mg by mouth daily. ergocalciferol (VITAMIN D2) 50,000 unit capsule Take 50,000 Units by mouth two (2) times a week. on Monday and Thursday      naloxone (NARCAN) 1 mg/mL injection 2 mL by IntraMUSCular route once as needed for up to 1 dose. Qty: 1 Syringe, Refills: 0       !! - Potential duplicate medications found. Please discuss with provider. The patient's chart, MAR and AVS were reviewed by Tyson Rodas [facility]. Discharging Provider: MARCELLUS Fragoso      Thank You,     Tyson Rodas, [facility]

## 2018-03-08 NOTE — DISCHARGE SUMMARY
Spine Discharge Summary    Patient ID:  Sahara Sahu  [id number]  female  41 y.o.  1970    Admit date: 3/5/2018    Discharge date: 3/8/2018    Admitting Physician: Ivone Byrd MD     Consulting Physician(s):   Treatment Team: Attending Provider: Ivone Byrd MD; Nurse Practitioner: Peyton Fisher NP; Nurse Practitioner: Destiny Avery NP; Utilization Review: Eleazar Houston; Care Manager: KATHARINE Bansal    Date of Surgery:   3/6/2018     Preoperative Diagnosis:  LUMBAGO, RADICULOPATHY, STENOSIS, SPONDELOLITHISIS    Postoperative Diagnosis:   LUMBAGO, RADICULOPATHY, STENOSIS, SPONDELOLITHISIS    Procedure(s):  Posterior and lateral fusion L4-5 on 3/5, then L4-5 Decompression on 3/6/18    Anesthesia Type:   General     Surgeon: Ivone Byrd MD                            HPI:  Pt is a 41 y.o. female who has a history of LUMBAGO, RADICULOPATHY, STENOSIS, SPONDELOLITHISIS  with pain and limitations of activities of daily living who presents at this time for a L4-5 fusion following the failure of conservative management. PMH:   Past Medical History:   Diagnosis Date    Bipolar affective disorder ([facility] [address]     Dissociative identity disorder     Hyperlipidemia     Hypertension     Morbid obesity ([facility] [address]     NIDDM (non-insulin dependent diabetes mellitus)     Psychiatric disorder     Depression/Anxiety    PTSD (post-traumatic stress disorder)        Body mass index is 48.83 kg/(m^2). : A BMI > 30 is classified as obesity and > 40 is classified as morbid obesity. Medications upon admission :   Prior to Admission Medications   Prescriptions Last Dose Informant Patient Reported? Taking? OXcarbaxepine (TRILEPTAL) 600 mg tablet 3/5/2018 at 0900  No Yes   Sig: Take 0.5 Tabs by mouth two (2) times a day. acetaminophen (TYLENOL EXTRA STRENGTH) 500 mg tablet 3/5/2018 at 0900 Self Yes Yes   Sig: Take 500 mg by mouth every six (6) hours as needed for Pain.    atorvastatin (LIPITOR) 40 mg tablet 3/4/2018 at Unknown time Self Yes Yes   Sig: Take 40 mg by mouth daily. baclofen (LIORESAL) 10 mg tablet 3/4/2018 at Unknown time Self Yes Yes   Sig: Take 10 mg by mouth every evening. clonazePAM (KLONOPIN) 1 mg tablet 3/5/2018 at 0900 Self Yes Yes   Sig: Take 1 mg by mouth two (2) times a day. Administration Instructions:  Patient takes 1 mg in the morning and evening and 0.5 mg in the afternoon. clonazePAM (KLONOPIN) 1 mg tablet 3/4/2018 at Unknown time Self Yes Yes   Sig: Take 0.5 mg by mouth daily (with lunch). Administration Instructions:  Patient takes 1 mg in the morning and evening and 0.5 mg in the afternoon. ergocalciferol (VITAMIN D2) 50,000 unit capsule 3/1/2018  Yes No   Sig: Take 50,000 Units by mouth two (2) times a week. on Monday and Thursday   gabapentin (NEURONTIN) 300 mg capsule 3/4/2018 at Unknown time Self Yes Yes   Sig: Take 300 mg by mouth two (2) times a day. insulin lispro (HUMALOG) 100 unit/mL kwikpen 3/4/2018 at Unknown time Self Yes Yes   Si Units by SubCUTAneous route Before breakfast and dinner. lamoTRIgine (LAMICTAL) 100 mg tablet 3/5/2018 at 0900  Yes Yes   Sig: Take 50 mg by mouth daily. lisinopril (PRINIVIL, ZESTRIL) 20 mg tablet 3/4/2018 at Unknown time Self Yes Yes   Sig: Take 20 mg by mouth daily. lurasidone (LATUDA) 80 mg tab tablet 3/4/2018 at Unknown time Self Yes Yes   Sig: Take 80 mg by mouth daily. meloxicam (MOBIC) 15 mg tablet 3/4/2018 at Unknown time Self Yes Yes   Sig: Take 15 mg by mouth daily. metFORMIN (GLUCOPHAGE) 1,000 mg tablet 3/4/2018 at Unknown time Self Yes Yes   Sig: Take 1,000 mg by mouth two (2) times daily (with meals). multivitamin (ONE A DAY) tablet 3/4/2018 at Unknown time Self Yes Yes   Sig: Take 1 Tab by mouth daily. naloxone (NARCAN) 1 mg/mL injection  Self No No   Si mL by IntraMUSCular route once as needed for up to 1 dose. Facility-Administered Medications: None        Allergies:     Allergies   Allergen Reactions    Darvon [Propoxyphene] Rash        Hospital Course: The patient underwent surgery. Complications:  None; patient tolerated the procedure well. Was taken to the PACU in stable condition and then transferred to the ortho floor. Perioperative Antibiotics:  Ancef     Postoperative Pain Management:  Oxycodone      Postoperative transfusions:    Number of units banked PRBCs =   none     Post Op complications: none    Hemoglobin at discharge:    Lab Results   Component Value Date/Time    HGB 9.3 (L) 03/08/2018 02:56 AM    INR 1.0 02/21/2018 01:15 PM       Dressing was changed on POD # 1. Incision - clean, dry and intact. No significant erythema or swelling. Neurovascular exam found to be within normal limits. Wound appears to be healing without any evidence of infection. Pt had a HVAC drain that was removed on POD# 2. Physical Therapy started on the day following surgery and participated in bed mobility, transfers and ambulation. Very painful in right leg and returned to OR for decompression of nerve root on right L4-5. With relief post-operatively. Gait:  Gait  Base of Support: Widened  Speed/Stacy: Pace decreased (<100 feet/min)  Step Length: Left shortened, Right shortened  Gait Abnormalities: Antalgic, Decreased step clearance  Ambulation - Level of Assistance: Modified independent, Supervision  Distance (ft): 300 Feet (ft)  Assistive Device: Gait belt, Walker, rolling  Rail Use: Both  Stairs - Level of Assistance: Contact guard assistance  Number of Stairs Trained: 4                 Discharged to: Home. Condition on Discharge:   stable    Discharge instructions:    - Take pain medications as prescribed  - Resume pre hospital diet      - Discharge activity: activity as tolerated  - Ambulate as tolerated  - Wound Care Keep wound clean and dry.   See discharge instruction sheet.  - Staples to be removed 10 days after surgery            -DISCHARGE MEDICATION LIST     Current Discharge Medication List      START taking these medications    Details   oxyCODONE IR (ROXICODONE) 5 mg immediate release tablet Take 1-2 Tabs by mouth every four (4) hours as needed. Max Daily Amount: 60 mg. One tab for mild to moderate pain level 1-6, or 2 tabs for severe pain level 7-10  Qty: 80 Tab, Refills: 0    Associated Diagnoses: S/P lumbar spinal fusion      polyethylene glycol (MIRALAX) 17 gram packet Take 1 Packet by mouth daily as needed (constipation) for up to 15 days. Qty: 15 Packet, Refills: 0      senna-docusate (PERICOLACE) 8.6-50 mg per tablet Take 1 Tab by mouth daily. Qty: 30 Tab, Refills: 0         CONTINUE these medications which have NOT CHANGED    Details   lamoTRIgine (LAMICTAL) 100 mg tablet Take 50 mg by mouth daily. OXcarbaxepine (TRILEPTAL) 600 mg tablet Take 0.5 Tabs by mouth two (2) times a day. Qty: 1 Tab, Refills: 0      atorvastatin (LIPITOR) 40 mg tablet Take 40 mg by mouth daily. baclofen (LIORESAL) 10 mg tablet Take 10 mg by mouth every evening.      gabapentin (NEURONTIN) 300 mg capsule Take 300 mg by mouth two (2) times a day. insulin lispro (HUMALOG) 100 unit/mL kwikpen 8 Units by SubCUTAneous route Before breakfast and dinner. acetaminophen (TYLENOL EXTRA STRENGTH) 500 mg tablet Take 500 mg by mouth every six (6) hours as needed for Pain. !! clonazePAM (KLONOPIN) 1 mg tablet Take 1 mg by mouth two (2) times a day. Administration Instructions:  Patient takes 1 mg in the morning and evening and 0.5 mg in the afternoon. !! clonazePAM (KLONOPIN) 1 mg tablet Take 0.5 mg by mouth daily (with lunch). Administration Instructions:  Patient takes 1 mg in the morning and evening and 0.5 mg in the afternoon. lurasidone (LATUDA) 80 mg tab tablet Take 80 mg by mouth daily. meloxicam (MOBIC) 15 mg tablet Take 15 mg by mouth daily. metFORMIN (GLUCOPHAGE) 1,000 mg tablet Take 1,000 mg by mouth two (2) times daily (with meals).       multivitamin (ONE A DAY) tablet Take 1 Tab by mouth daily. lisinopril (PRINIVIL, ZESTRIL) 20 mg tablet Take 20 mg by mouth daily. ergocalciferol (VITAMIN D2) 50,000 unit capsule Take 50,000 Units by mouth two (2) times a week. on Monday and Thursday      naloxone (NARCAN) 1 mg/mL injection 2 mL by IntraMUSCular route once as needed for up to 1 dose. Qty: 1 Syringe, Refills: 0       !! - Potential duplicate medications found. Please discuss with provider. per medical continuation form      -Follow up in office in 2 weeks      Signed:  Lenny Bowman.  Marcia Leonard, JOYCELYNP-BC, ONP-C  Orthopaedic Nurse Practitioner    3/8/2018  9:36 AM

## 2018-03-09 NOTE — PROGRESS NOTES
Spiritual Care Partner Volunteer visited patient in Ortho on 3/8/18. Documented by:  MARCELLUS Chavez

## 2018-09-13 ENCOUNTER — HOSPITAL ENCOUNTER (OUTPATIENT)
Dept: MAMMOGRAPHY | Age: 48
Discharge: HOME | End: 2018-09-13
Attending: OBSTETRICS & GYNECOLOGY
Payer: MEDICARE

## 2018-09-13 DIAGNOSIS — N63.0 LUMP OR MASS IN BREAST: ICD-10-CM

## 2018-09-13 DIAGNOSIS — Z80.9 FAMILY HX-MALIGNANCY: ICD-10-CM

## 2018-09-13 PROCEDURE — 76642 ULTRASOUND BREAST LIMITED: CPT

## 2018-09-13 PROCEDURE — 77062 BREAST TOMOSYNTHESIS BI: CPT

## 2018-10-10 ENCOUNTER — HOSPITAL ENCOUNTER (OUTPATIENT)
Dept: MRI IMAGING | Age: 48
Discharge: HOME | End: 2018-10-10
Attending: OBSTETRICS & GYNECOLOGY
Payer: MEDICARE

## 2018-10-10 DIAGNOSIS — N63.0 LUMP OR MASS IN BREAST: ICD-10-CM

## 2018-10-10 DIAGNOSIS — Z80.9 FAMILY HX-MALIGNANCY: ICD-10-CM

## 2018-10-10 PROCEDURE — 74011250636 HC RX REV CODE- 250/636: Performed by: OBSTETRICS & GYNECOLOGY

## 2018-10-10 PROCEDURE — A9585 GADOBUTROL INJECTION: HCPCS | Performed by: OBSTETRICS & GYNECOLOGY

## 2018-10-10 PROCEDURE — 0159T MRI BREAST BI W WO CONT: CPT

## 2018-10-10 PROCEDURE — 74011000258 HC RX REV CODE- 258: Performed by: OBSTETRICS & GYNECOLOGY

## 2018-10-10 RX ADMIN — SODIUM CHLORIDE 100 ML: 9 INJECTION, SOLUTION INTRAVENOUS at 13:06

## 2018-10-10 RX ADMIN — GADOBUTROL 10 ML: 604.72 INJECTION INTRAVENOUS at 13:06

## 2018-11-15 ENCOUNTER — HOSPITAL ENCOUNTER (OUTPATIENT)
Dept: MAMMOGRAPHY | Age: 48
Discharge: HOME | End: 2018-11-15
Attending: OBSTETRICS & GYNECOLOGY
Payer: MEDICARE

## 2018-11-15 DIAGNOSIS — N63.0 LUMP OR MASS IN BREAST: ICD-10-CM

## 2018-11-15 PROCEDURE — 74011250636 HC RX REV CODE- 250/636: Performed by: RADIOLOGY

## 2018-11-15 PROCEDURE — 88305 TISSUE EXAM BY PATHOLOGIST: CPT

## 2018-11-15 PROCEDURE — 77030020004 US BX BREAST VAC LT 1ST LESION W/CLIP AND SPECIMEN

## 2018-11-15 PROCEDURE — 77065 DX MAMMO INCL CAD UNI: CPT

## 2018-11-15 PROCEDURE — 74011000250 HC RX REV CODE- 250: Performed by: RADIOLOGY

## 2018-11-15 RX ORDER — LIDOCAINE HYDROCHLORIDE AND EPINEPHRINE 10; 10 MG/ML; UG/ML
10 INJECTION, SOLUTION INFILTRATION; PERINEURAL ONCE
Status: COMPLETED | OUTPATIENT
Start: 2018-11-15 | End: 2018-11-15

## 2018-11-15 RX ORDER — LIDOCAINE HYDROCHLORIDE 10 MG/ML
5 INJECTION, SOLUTION EPIDURAL; INFILTRATION; INTRACAUDAL; PERINEURAL ONCE
Status: COMPLETED | OUTPATIENT
Start: 2018-11-15 | End: 2018-11-15

## 2018-11-15 RX ADMIN — LIDOCAINE HYDROCHLORIDE AND EPINEPHRINE 100 MG: 10; 10 INJECTION, SOLUTION INFILTRATION; PERINEURAL at 10:30

## 2018-11-15 RX ADMIN — LIDOCAINE HYDROCHLORIDE 5 ML: 10 INJECTION, SOLUTION EPIDURAL; INFILTRATION; INTRACAUDAL; PERINEURAL at 10:30

## 2018-11-15 NOTE — DISCHARGE INSTRUCTIONS
Breast Biopsy Discharge Instructions    PAIN CONTROL     You may have mild discomfort; take 1-2 Tylenol every 4-6 hours as needed.  Do not take aspirin containing products or anti-inflammatory medications (Advil, Aleve, Motrin, Ibuprofen, etc.) for 24 hours.  Wearing a comfortable bra for support may help with discomfort. WOUND CARE      A small amount of bleeding or bruising at the biopsy site is normal. Watch for signs and symptoms of infections: skin warm to touch, yellowish drainage from wound, fever or severe pain.  Place an ice pack over the site hourly, 20-30 at a time for a few hours today.  The clear dressing is water resistant (you may shower, but do not allow the dressing to become saturated). You may remove the dressing in 48 hours. The steri-strips (small pieces of tape covering the incision) will fall off on their own in a few days. If the clear dressing irritates your skin or begins to peel off, you may remove it. Remember, if you remove the clear dressing before 48 hours, you should not get the site wet.  If at any point you have EXCESSIVE BLEEDING (saturating the gauze under the clear dressing) OR pain please call:    Daytime 7:30am-5:00pm: [facility] [phone]    After Hours:    [phone] (ask to speak to a radiologist)              or [address] [place] [address] [phone]    ACTIVITY     Do not participate in any strenuous activities for 24 hours (exercise, sports, housework, etc.).  You may resume work (light duty only) for the first 24 hours.  No heavy lifting with the arm on the affected side of your body.     ADDITIONAL INSTRUCTIONS    We will call you with results on       [address] [facility] [place]    MD: Ashley  RN: Hugh Smiley  Radiology Tech: Emmett Mcintosh

## 2018-11-15 NOTE — PROGRESS NOTES
Patient tolerated left breast biopsy well with scant bleeding. Biopsy site was bandaged in the usual fashion and discharge instructions were reviewed with the patient. She was provided with a written copy as well. Advised patient that results should be available by Monday and that she can expect a phone call in the afternoon. Encouraged patient to call with any questions or concerns.

## 2018-11-19 NOTE — PROGRESS NOTES
Pathology reviewed by Dr. Mare Romero. Spoke with patient and advised her that the pathology results are benign and that she should follow up with her annual mammogram schedule. Patient reports no concerns with the biopsy site. Encouraged patient to call with any future questions or concerns.

## 2019-07-06 ENCOUNTER — APPOINTMENT (OUTPATIENT)
Dept: GENERAL RADIOLOGY | Age: 49
End: 2019-07-06
Attending: STUDENT IN AN ORGANIZED HEALTH CARE EDUCATION/TRAINING PROGRAM
Payer: MEDICARE

## 2019-07-06 ENCOUNTER — HOSPITAL ENCOUNTER (EMERGENCY)
Age: 49
Discharge: HOME | End: 2019-07-06
Attending: EMERGENCY MEDICINE | Admitting: EMERGENCY MEDICINE
Payer: MEDICARE

## 2019-07-06 VITALS
HEART RATE: 77 BPM | WEIGHT: 194 LBS | BODY MASS INDEX: 35.7 KG/M2 | HEIGHT: 62 IN | OXYGEN SATURATION: 98 % | TEMPERATURE: 98 F | RESPIRATION RATE: 18 BRPM | SYSTOLIC BLOOD PRESSURE: 123 MMHG | DIASTOLIC BLOOD PRESSURE: 70 MMHG

## 2019-07-06 DIAGNOSIS — M25.531 WRIST PAIN, ACUTE, RIGHT: ICD-10-CM

## 2019-07-06 DIAGNOSIS — M54.5 MIDLINE LOW BACK PAIN, UNSPECIFIED CHRONICITY, WITH SCIATICA PRESENCE UNSPECIFIED: ICD-10-CM
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
## 2019-07-06 LAB — HCG UR QL: NEGATIVE

## 2019-07-06 PROCEDURE — 99284 EMERGENCY DEPT VISIT MOD MDM: CPT

## 2019-07-06 PROCEDURE — 74011250637 HC RX REV CODE- 250/637: Performed by: STUDENT IN AN ORGANIZED HEALTH CARE EDUCATION/TRAINING PROGRAM

## 2019-07-06 PROCEDURE — 72100 X-RAY EXAM L-S SPINE 2/3 VWS: CPT

## 2019-07-06 PROCEDURE — 81025 URINE PREGNANCY TEST: CPT

## 2019-07-06 PROCEDURE — 73110 X-RAY EXAM OF WRIST: CPT

## 2019-07-06 PROCEDURE — 73130 X-RAY EXAM OF HAND: CPT

## 2019-07-06 RX ORDER — OXYCODONE HYDROCHLORIDE 5 MG/1
5 TABLET ORAL
Status: COMPLETED | OUTPATIENT
Start: 2019-07-06 | End: 2019-07-06

## 2019-07-06 RX ADMIN — OXYCODONE HYDROCHLORIDE 5 MG: 5 TABLET ORAL at 17:23
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
## 2019-07-06 NOTE — ED NOTES
Roma Hurley DO reviewed discharge instructions with the patient. The patient verbalized understanding. Ambulatory on discharge.

## 2019-07-06 NOTE — DISCHARGE INSTRUCTIONS
Patient Education        Back Pain: Care Instructions  Your Care Instructions    Back pain has many possible causes. It is often related to problems with muscles and ligaments of the back. It may also be related to problems with the nerves, discs, or bones of the back. Moving, lifting, standing, sitting, or sleeping in an awkward way can strain the back. Sometimes you don't notice the injury until later. Arthritis is another common cause of back pain. Although it may hurt a lot, back pain usually improves on its own within several weeks. Most people recover in 12 weeks or less. Using good home treatment and being careful not to stress your back can help you feel better sooner. Follow-up care is a key part of your treatment and safety. Be sure to make and go to all appointments, and call your doctor if you are having problems. It's also a good idea to know your test results and keep a list of the medicines you take. How can you care for yourself at home? · Sit or lie in positions that are most comfortable and reduce your pain. Try one of these positions when you lie down:  ? Lie on your back with your knees bent and supported by large pillows. ? Lie on the floor with your legs on the seat of a sofa or chair. ? Lie on your side with your knees and hips bent and a pillow between your legs. ? Lie on your stomach if it does not make pain worse. · Do not sit up in bed, and avoid soft couches and twisted positions. Bed rest can help relieve pain at first, but it delays healing. Avoid bed rest after the first day of back pain. · Change positions every 30 minutes. If you must sit for long periods of time, take breaks from sitting. Get up and walk around, or lie in a comfortable position. · Try using a heating pad on a low or medium setting for 15 to 20 minutes every 2 or 3 hours. Try a warm shower in place of one session with the heating pad. · You can also try an ice pack for 10 to 15 minutes every 2 to 3 hours. Put a thin cloth between the ice pack and your skin. · Take pain medicines exactly as directed. ? If the doctor gave you a prescription medicine for pain, take it as prescribed. ? If you are not taking a prescription pain medicine, ask your doctor if you can take an over-the-counter medicine. · Take short walks several times a day. You can start with 5 to 10 minutes, 3 or 4 times a day, and work up to longer walks. Walk on level surfaces and avoid hills and stairs until your back is better. · Return to work and other activities as soon as you can. Continued rest without activity is usually not good for your back. · To prevent future back pain, do exercises to stretch and strengthen your back and stomach. Learn how to use good posture, safe lifting techniques, and proper body mechanics. When should you call for help? Call your doctor now or seek immediate medical care if:    · You have new or worsening numbness in your legs.     · You have new or worsening weakness in your legs. (This could make it hard to stand up.)     · You lose control of your bladder or bowels.    Watch closely for changes in your health, and be sure to contact your doctor if:    · You have a fever, lose weight, or don't feel well.     · You do not get better as expected. Where can you learn more? Go to http://alicia-elfego.info/. Enter W293 in the search box to learn more about \"Back Pain: Care Instructions. \"  Current as of: September 20, 2018  Content Version: 11.9  © 3333-6567 Healthwise, Incorporated. Care instructions adapted under license by [employer] (which disclaims liability or warranty for this information). If you have questions about a medical condition or this instruction, always ask your healthcare professional. [place] [address] any warranty or liability for your use of this information.          Patient Education        Joint Pain: Care Instructions  Your Care Instructions    Many people have small aches and pains from overuse or injury to muscles and joints. Joint injuries often happen during sports or recreation, work tasks, or projects around the home. An overuse injury can happen when you put too much stress on a joint or when you do an activity that stresses the joint over and over, such as using the computer or rowing a boat. You can take action at home to help your muscles and joints get better. You should feel better in 1 to 2 weeks, but it can take 3 months or more to heal completely. Follow-up care is a key part of your treatment and safety. Be sure to make and go to all appointments, and call your doctor if you are having problems. It's also a good idea to know your test results and keep a list of the medicines you take. How can you care for yourself at home? · Do not put weight on the injured joint for at least a day or two. · For the first day or two after an injury, do not take hot showers or baths, and do not use hot packs. The heat could make swelling worse. · Put ice or a cold pack on the sore joint for 10 to 20 minutes at a time. Try to do this every 1 to 2 hours for the next 3 days (when you are awake) or until the swelling goes down. Put a thin cloth between the ice and your skin. · Wrap the injury in an elastic bandage. Do not wrap it too tightly because this can cause more swelling. · Prop up the sore joint on a pillow when you ice it or anytime you sit or lie down during the next 3 days. Try to keep it above the level of your heart. This will help reduce swelling. · Take an over-the-counter pain medicine, such as acetaminophen (Tylenol), ibuprofen (Advil, Motrin), or naproxen (Aleve). Read and follow all instructions on the label. · After 1 or 2 days of rest, begin moving the joint gently. While the joint is still healing, you can begin to exercise using activities that do not strain or hurt the painful joint.   When should you call for help?  Call your doctor now or seek immediate medical care if:    · You have signs of infection, such as:  ? Increased pain, swelling, warmth, and redness. ? Red streaks leading from the joint. ? A fever.    Watch closely for changes in your health, and be sure to contact your doctor if:    · Your movement or symptoms are not getting better after 1 to 2 weeks of home treatment. Where can you learn more? Go to http://alicia-elfego.info/. Enter P205 in the search box to learn more about \"Joint Pain: Care Instructions. \"  Current as of: September 20, 2018  Content Version: 11.9  © 9128-6942 [employer]. Care instructions adapted under license by [employer] (which disclaims liability or warranty for this information). If you have questions about a medical condition or this instruction, always ask your healthcare professional. [place] [address] any warranty or liability for your use of this information. Patient Education     Musculoskeletal Pain: Care Instructions  Your Care Instructions  Different problems with the bones, muscles, nerves, ligaments, and tendons in the body can cause pain. One or more areas of your body may ache or burn. Or they may feel tired, stiff, or sore. The medical term for this type of pain is musculoskeletal pain. It can have many different causes. Sometimes the pain is caused by an injury such as a strain or sprain. Or you might have pain from using one part of your body in the same way over and over again. This is called overuse. In some cases, the cause of the pain is another health problem such as arthritis or fibromyalgia. The doctor will examine you and ask you questions about your health to help find the cause of your pain. Blood tests or imaging tests like an X-ray may also be helpful. But sometimes doctors can't find a cause of the pain.   Treatment depends on your symptoms and the cause of the pain, if known.  The doctor has checked you carefully, but problems can develop later. If you notice any problems or new symptoms, get medical treatment right away. Follow-up care is a key part of your treatment and safety. Be sure to make and go to all appointments, and call your doctor if you are having problems. It's also a good idea to know your test results and keep a list of the medicines you take. How can you care for yourself at home? · Rest until you feel better. · Do not do anything that makes the pain worse. Return to exercise gradually if you feel better and your doctor says it's okay. · Be safe with medicines. Read and follow all instructions on the label. ¨ If the doctor gave you a prescription medicine for pain, take it as prescribed. ¨ If you are not taking a prescription pain medicine, ask your doctor if you can take an over-the-counter medicine. · Put ice or a cold pack on the area for 10 to 20 minutes at a time to ease pain. Put a thin cloth between the ice and your skin. When should you call for help? Call your doctor now or seek immediate medical care if:  · You have new pain, or your pain gets worse. · You have new symptoms such as a fever, a rash, or chills. Watch closely for changes in your health, and be sure to contact your doctor if:  · You do not get better as expected. Where can you learn more? Go to [employer]  Enter Q624 in the search box to learn more about \"Musculoskeletal Pain: Care Instructions. \"   © 1757-2361 Healthwise, Incorporated. Care instructions adapted under license by [facility] [email or web address] [facility] (which disclaims liability or warranty for this information). This care instruction is for use with your licensed healthcare professional. If you have questions about a medical condition or this instruction, always ask your healthcare professional. [address] any warranty or liability for your use of this information.   Content Version: 43.4.907195; Current as of: November 20, 2015

## 2020-10-19 ENCOUNTER — HOSPITAL ENCOUNTER (EMERGENCY)
Age: 50
Discharge: HOME | End: 2020-10-19
Attending: EMERGENCY MEDICINE
Payer: MEDICARE

## 2020-10-19 VITALS
HEART RATE: 68 BPM | TEMPERATURE: 98.1 F | RESPIRATION RATE: 16 BRPM | OXYGEN SATURATION: 97 % | SYSTOLIC BLOOD PRESSURE: 118 MMHG | BODY MASS INDEX: 41.49 KG/M2 | WEIGHT: 225.5 LBS | DIASTOLIC BLOOD PRESSURE: 73 MMHG | HEIGHT: 62 IN

## 2020-10-19 DIAGNOSIS — M54.41 ACUTE MIDLINE LOW BACK PAIN WITH RIGHT-SIDED SCIATICA: Primary | ICD-10-CM

## 2020-10-19 PROCEDURE — 74011636637 HC RX REV CODE- 636/637: Performed by: EMERGENCY MEDICINE

## 2020-10-19 PROCEDURE — 99282 EMERGENCY DEPT VISIT SF MDM: CPT

## 2020-10-19 PROCEDURE — 74011000250 HC RX REV CODE- 250: Performed by: EMERGENCY MEDICINE

## 2020-10-19 PROCEDURE — 74011250637 HC RX REV CODE- 250/637: Performed by: EMERGENCY MEDICINE

## 2020-10-19 RX ORDER — METHYLPREDNISOLONE 4 MG/1
TABLET ORAL
Qty: 1 DOSE PACK | Refills: 0 | Status: SHIPPED | OUTPATIENT
Start: 2020-10-19 | End: 2020-10-19 | Stop reason: SDUPTHER

## 2020-10-19 RX ORDER — ACETAMINOPHEN 500 MG
1000 TABLET ORAL
Status: COMPLETED | OUTPATIENT
Start: 2020-10-19 | End: 2020-10-19

## 2020-10-19 RX ORDER — LIDOCAINE 50 MG/G
1 PATCH TOPICAL EVERY 24 HOURS
Qty: 5 PATCH | Refills: 0 | Status: SHIPPED | OUTPATIENT
Start: 2020-10-19 | End: 2020-10-19 | Stop reason: SDUPTHER

## 2020-10-19 RX ORDER — LIDOCAINE 4 G/100G
1 PATCH TOPICAL
Status: DISCONTINUED | OUTPATIENT
Start: 2020-10-19 | End: 2020-10-19 | Stop reason: HOSPADM

## 2020-10-19 RX ORDER — PREDNISONE 20 MG/1
60 TABLET ORAL
Status: COMPLETED | OUTPATIENT
Start: 2020-10-19 | End: 2020-10-19

## 2020-10-19 RX ORDER — ACETAMINOPHEN 500 MG
1000 TABLET ORAL 3 TIMES DAILY
Qty: 24 TAB | Refills: 0 | Status: SHIPPED | OUTPATIENT
Start: 2020-10-19 | End: 2020-10-19 | Stop reason: SDUPTHER

## 2020-10-19 RX ORDER — METHOCARBAMOL 500 MG/1
500 TABLET, FILM COATED ORAL
Qty: 25 TAB | Refills: 0 | Status: SHIPPED | OUTPATIENT
Start: 2020-10-19 | End: 2020-10-26

## 2020-10-19 RX ORDER — LIDOCAINE 50 MG/G
1 PATCH TOPICAL EVERY 24 HOURS
Qty: 5 PATCH | Refills: 0 | Status: SHIPPED | OUTPATIENT
Start: 2020-10-19 | End: 2021-01-11

## 2020-10-19 RX ORDER — METHOCARBAMOL 500 MG/1
500 TABLET, FILM COATED ORAL
Qty: 25 TAB | Refills: 0 | Status: SHIPPED | OUTPATIENT
Start: 2020-10-19 | End: 2020-10-19 | Stop reason: SDUPTHER

## 2020-10-19 RX ORDER — ACETAMINOPHEN 500 MG
1000 TABLET ORAL 3 TIMES DAILY
Qty: 24 TAB | Refills: 0 | Status: SHIPPED | OUTPATIENT
Start: 2020-10-19 | End: 2020-10-23

## 2020-10-19 RX ORDER — METHYLPREDNISOLONE 4 MG/1
TABLET ORAL
Qty: 1 DOSE PACK | Refills: 0 | Status: SHIPPED | OUTPATIENT
Start: 2020-10-19 | End: 2021-01-11

## 2020-10-19 RX ADMIN — PREDNISONE 60 MG: 20 TABLET ORAL at 05:42

## 2020-10-19 RX ADMIN — ACETAMINOPHEN 1000 MG: 500 TABLET ORAL at 05:42

## 2020-10-19 NOTE — ED TRIAGE NOTES
Patient arrives to the ED with c/o lower back pain \"off an on since 2010\", states pain is worse today, and is \"shooting down the back of her right leg\", states it making it difficult to walk, patient ambulate from triage to room no difficultly noted.

## 2020-10-19 NOTE — ED PROVIDER NOTES
72-year-old female presenting ER with complaint of lower back pain. Patient has history of lower back pain and has had previous surgeries. However 2 days ago had acute worsening of her back pain now radiating down the right leg. Pain is described as sharp. Patient reports mild numbness on the anterior lateral right leg. No numbness in the feet. No saddle anesthesia no bowel or bladder incontinence. No report of any new back injuries or trauma. No report of any injections. Patient has been taking Aleve without significant improvement of her symptoms. Patient denies any abdominal pain no nausea vomiting diarrhea no urinary symptoms. No blood in the urine or urinary frequency or dysuria. No shortness of breath or cough or chest pain.              Past Medical History:   Diagnosis Date    Bipolar affective disorder ([address]     [facility]     Hyperlipidemia     Hypertension     Morbid obesity ([facility]     NIDDM (non-insulin dependent diabetes mellitus)     Psychiatric disorder     Depression/Anxiety    PTSD (post-traumatic stress disorder)        Past Surgical History:   Procedure Laterality Date    DELIVERY       HX BACK SURGERY      HX BREAST BIOPSY Left     u/s benign biopsy 3 years ago    HX CHOLECYSTECTOMY      HX TONSILLECTOMY      HX TUBAL LIGATION      AK REPAIR ACHILLES TENDON,PRIMARY           Family History:   Problem Relation Age of Onset    Breast Cancer Mother 46    Lung Disease Mother     Heart Disease Mother     Hypertension Mother     Cancer Mother     Breast Cancer Sister 43    Breast Cancer Maternal Grandmother 52         from breast cancer    Breast Cancer Maternal Aunt         6, in 42's, 4  from breast cancer    Breast Cancer Cousin         21 maternal cousins    Heart Disease Father     Diabetes Father     Hypertension Father        Social History     Socioeconomic History    Marital status:      Spouse name: Not on file    Number of children: Not on file    Years of education: Not on file    Highest education level: Not on file   Occupational History    Not on file   Social Needs    Financial resource strain: Not on file    Food insecurity     Worry: Not on file     Inability: Not on file    Transportation needs     Medical: Not on file     Non-medical: Not on file   Tobacco Use    Smoking status: Never Smoker    Smokeless tobacco: Never Used   Substance and Sexual Activity    Alcohol use: No    Drug use: No    Sexual activity: Yes     Partners: Male     Birth control/protection: None   Lifestyle    Physical activity     Days per week: Not on file     Minutes per session: Not on file    Stress: Not on file   Relationships    Social connections     Talks on phone: Not on file     Gets together: Not on file     Attends Protestant service: Not on file     Active member of club or organization: Not on file     Attends meetings of clubs or organizations: Not on file     Relationship status: Not on file    Intimate partner violence     Fear of current or ex partner: Not on file     Emotionally abused: Not on file     Physically abused: Not on file     Forced sexual activity: Not on file   Other Topics Concern    Not on file   Social History Narrative    Not on file         ALLERGIES: Darvon [propoxyphene]    Review of Systems   Constitutional: Negative for chills and fever. HENT: Negative for congestion and sore throat. Eyes: Negative for pain. Respiratory: Negative for shortness of breath. Cardiovascular: Negative for chest pain. Gastrointestinal: Negative for abdominal pain, diarrhea, nausea and vomiting. Genitourinary: Negative for dysuria, flank pain, frequency, pelvic pain, vaginal bleeding and vaginal discharge. Musculoskeletal: Positive for back pain. Negative for neck pain. Skin: Negative for rash. Neurological: Negative for dizziness, weakness, numbness and headaches.        Vitals: 10/19/20 0513   BP: 118/73   Pulse: 68   Resp: 16   Temp: 98.1 °F (36.7 °C)   SpO2: 97%   Weight: 102.3 kg (225 lb 8 oz)   Height: 5' 2\" (1.575 m)            Physical Exam  Constitutional:       Appearance: She is well-developed. HENT:      Head: Normocephalic. Nose: Nose normal.      Mouth/Throat:      Pharynx: Oropharynx is clear. Eyes:      Conjunctiva/sclera: Conjunctivae normal.   Neck:      Musculoskeletal: Normal range of motion and neck supple. Cardiovascular:      Rate and Rhythm: Normal rate. Pulmonary:      Effort: Pulmonary effort is normal. No respiratory distress. Breath sounds: Normal breath sounds. Abdominal:      General: Bowel sounds are normal.      Palpations: Abdomen is soft. Tenderness: There is no abdominal tenderness. Musculoskeletal: Normal range of motion. Lumbar back: She exhibits tenderness and bony tenderness. She exhibits no swelling and no spasm. Skin:     General: Skin is warm. Capillary Refill: Capillary refill takes less than 2 seconds. Findings: No rash. Neurological:      Mental Status: She is alert and oriented to person, place, and time. Sensory: Sensation is intact. Motor: Motor function is intact. Deep Tendon Reflexes:      Reflex Scores:       Patellar reflexes are 2+ on the right side and 2+ on the left side. Comments: No gross motor or sensory deficits          MDM  Number of Diagnoses or Management Options  Acute midline low back pain with right-sided sciatica:   Diagnosis management comments: Patient with back pain. No abdominal pain and no urinary symptoms. Symptoms seem consistent with number radiculopathy/sciatica.   Discussed symptomatic treatment and follow-up with her back specialist.         Procedures

## 2020-10-19 NOTE — ED NOTES
Discharge instructions givenand reviewed with pt. Pt. Verbalized understanding. Steady gait on discharge.

## 2020-10-28 ENCOUNTER — APPOINTMENT (OUTPATIENT)
Dept: CT IMAGING | Age: 50
End: 2020-10-28
Attending: NURSE PRACTITIONER
Payer: MEDICARE

## 2020-10-28 ENCOUNTER — HOSPITAL ENCOUNTER (EMERGENCY)
Age: 50
Discharge: HOME | End: 2020-10-28
Attending: STUDENT IN AN ORGANIZED HEALTH CARE EDUCATION/TRAINING PROGRAM | Admitting: STUDENT IN AN ORGANIZED HEALTH CARE EDUCATION/TRAINING PROGRAM
Payer: MEDICARE

## 2020-10-28 VITALS
SYSTOLIC BLOOD PRESSURE: 132 MMHG | DIASTOLIC BLOOD PRESSURE: 78 MMHG | TEMPERATURE: 98 F | RESPIRATION RATE: 16 BRPM | HEART RATE: 70 BPM | OXYGEN SATURATION: 98 %

## 2020-10-28 DIAGNOSIS — M54.16 LUMBAR RADICULOPATHY: Primary | ICD-10-CM

## 2020-10-28 PROCEDURE — 99283 EMERGENCY DEPT VISIT LOW MDM: CPT

## 2020-10-28 PROCEDURE — 74011250637 HC RX REV CODE- 250/637: Performed by: NURSE PRACTITIONER

## 2020-10-28 PROCEDURE — 72131 CT LUMBAR SPINE W/O DYE: CPT

## 2020-10-28 RX ORDER — PREDNISONE 50 MG/1
50 TABLET ORAL DAILY
Qty: 5 TAB | Refills: 0 | Status: SHIPPED | OUTPATIENT
Start: 2020-10-28 | End: 2020-11-02

## 2020-10-28 RX ORDER — TIZANIDINE HYDROCHLORIDE 6 MG/1
CAPSULE, GELATIN COATED ORAL
Qty: 20 CAP | Refills: 0 | Status: SHIPPED | OUTPATIENT
Start: 2020-10-28 | End: 2021-01-11

## 2020-10-28 RX ORDER — METAXALONE 800 MG/1
800 TABLET ORAL
Qty: 20 TAB | Refills: 0 | Status: SHIPPED | OUTPATIENT
Start: 2020-10-28 | End: 2020-11-02

## 2020-10-28 RX ORDER — IBUPROFEN 400 MG/1
800 TABLET ORAL
Status: COMPLETED | OUTPATIENT
Start: 2020-10-28 | End: 2020-10-28

## 2020-10-28 RX ORDER — HYDROCODONE BITARTRATE AND ACETAMINOPHEN 5; 325 MG/1; MG/1
1 TABLET ORAL
Status: COMPLETED | OUTPATIENT
Start: 2020-10-28 | End: 2020-10-28

## 2020-10-28 RX ORDER — HYDROCODONE BITARTRATE AND ACETAMINOPHEN 5; 325 MG/1; MG/1
1 TABLET ORAL
Qty: 10 TAB | Refills: 0 | Status: SHIPPED | OUTPATIENT
Start: 2020-10-28 | End: 2020-10-31

## 2020-10-28 RX ORDER — METHOCARBAMOL 500 MG/1
500 TABLET, FILM COATED ORAL ONCE
Status: COMPLETED | OUTPATIENT
Start: 2020-10-28 | End: 2020-10-28

## 2020-10-28 RX ADMIN — HYDROCODONE BITARTRATE AND ACETAMINOPHEN 1 TABLET: 5; 325 TABLET ORAL at 14:32

## 2020-10-28 RX ADMIN — IBUPROFEN 800 MG: 400 TABLET, FILM COATED ORAL at 14:32

## 2020-10-28 RX ADMIN — METHOCARBAMOL TABLETS 500 MG: 500 TABLET, COATED ORAL at 14:32

## 2020-10-28 NOTE — DISCHARGE INSTRUCTIONS
Please try to follow-up with orthopedics as soon as possible for reevaluation of your low back pain and to discuss long-term treatment options. Please contact one of the pain management specialist listed below. Please the list of Pain Management Specialist below for your convenience:    List of Pain Management Specialists:    Robi Cabezas M.D. [phone] Pain Management  Katie Obando M.D. [phone] Physical Medicine and Giovanni Whitney M.D. [phone] Physical Medicine and Bernard Meza M.D. [phone] Pain Management  Shoshana Wooten M.D. [phone] Pain Management    Dr. Cecilia Shea, [facility] [address], [address]  [phone]    [facility] Center                            LUIS FELIPE Biswas MD DO  [address]                               [address] [facility], [address] [facility] [address]                            [address]  [phone]                                                  [phone]    Dr. Kale Guerra                                   [address]                                     [place] [phone]                                                                            Dr. Saurav Aguilar, [facility] [address] [facility] [address]  CEFERINO [facility], [address] [facility], [address] [facility] [address]                                        [facility], [address]  [email or web address]                                            [phone]                                                                                                                          Dr. Marie Beth, [address]  [place], [address]       [phone]              Dr. Waylon Sexton  [facility], [facility]  21       Dr. Chente Becker 53, [facility] [address] [facility]. [address] [place] [address], [address] [facility], [address] [place] [address]  [phone]  [phone]   [address] [place] [address]  [address] [place] [address], [address] [facility]  [phone]    Pain Management, Physical Medicine & Rehabilitation  Dr. Goyo Urbano, [address] [place] [address]    Pain Management  Dr. Keisha Bowers  [address] [facility], 40 [facility]    Physical Medicine & Rehabilitation, Pain Management  Jorge Duval MD  [address]    Fuentes Dee Dee Gauthier DO, PMR  [address] Lashawn Mccoy, [address] [place] [address]  [phone]      Please also consider natural alternatives to pain relief such as physical therapy, massage therapy, acupuncture, chiropractors, reflexology, and trigger point injections. [facility]  [email or web address]

## 2020-10-28 NOTE — ED TRIAGE NOTES
Lower back pain x 2 months with radiating pain down leg, +numnbess to part of her thigh, denies incontinence , denies injury

## 2020-10-28 NOTE — ED PROVIDER NOTES
This is a 53-year-old female with past medical history including bipolar disorder, dissociative identity disorder, hyperlipidemia, hypertension, obesity, and PTSD who presents the ER today for chief complaint of back pain. Patient states that she has been experiencing severe lower back pain (for approximately 12 weeks in duration) with intermittent shooting pains and tingling down her right lower extremity that have not been alleviated by lidocaine patches, Tylenol, anti-inflammatories, and oral corticosteroids (last taken about a week ago). She reports having the ER for the same problems earlier this month, for which no imaging was obtained and she was discharged with prescriptions that did not help her symptoms. She states that she called her primary care doctor (unknown of the physician's name) from [facility] who recommended that she come in for neuroimaging. Patient states that she has been unable to follow-up with any back pain specialist or orthopedists since she was last seen in the emergency department.     ROS negative for: Saddle numbness, bowel/bladder disturbances, foot drop, motor weakness, fever/chills           Past Medical History:   Diagnosis Date    Bipolar affective disorder ([address]     Dissociative identity disorder ([address]     Hyperlipidemia     Hypertension     Morbid obesity ([facility]     NIDDM (non-insulin dependent diabetes mellitus)     Psychiatric disorder     Depression/Anxiety    PTSD (post-traumatic stress disorder)        Past Surgical History:   Procedure Laterality Date    DELIVERY       HX BACK SURGERY      HX BREAST BIOPSY Left     u/s benign biopsy 3 years ago    HX CHOLECYSTECTOMY      HX TONSILLECTOMY      HX TUBAL LIGATION      MA REPAIR ACHILLES TENDON,PRIMARY           Family History:   Problem Relation Age of Onset   Rafia Jade Breast Cancer Mother 46    Lung Disease Mother     Heart Disease Mother     Hypertension Mother     Cancer Mother    Rafia Jade Breast Cancer Sister 43    Breast Cancer Maternal Grandmother 52         from breast cancer    Breast Cancer Maternal Aunt         6, in 42's, 4  from breast cancer    Breast Cancer Cousin         21 maternal cousins    Heart Disease Father     Diabetes Father     Hypertension Father        Social History     Socioeconomic History    Marital status:      Spouse name: Not on file    Number of children: Not on file    Years of education: Not on file    Highest education level: Not on file   Occupational History    Not on file   Social Needs    Financial resource strain: Not on file    Food insecurity     Worry: Not on file     Inability: Not on file    Transportation needs     Medical: Not on file     Non-medical: Not on file   Tobacco Use    Smoking status: Never Smoker    Smokeless tobacco: Never Used   Substance and Sexual Activity    Alcohol use: No    Drug use: No    Sexual activity: Yes     Partners: Male     Birth control/protection: None   Lifestyle    Physical activity     Days per week: Not on file     Minutes per session: Not on file    Stress: Not on file   Relationships    Social connections     Talks on phone: Not on file     Gets together: Not on file     Attends Yarsanism service: Not on file     Active member of club or organization: Not on file     Attends meetings of clubs or organizations: Not on file     Relationship status: Not on file    Intimate partner violence     Fear of current or ex partner: Not on file     Emotionally abused: Not on file     Physically abused: Not on file     Forced sexual activity: Not on file   Other Topics Concern    Not on file   Social History Narrative    Not on file         ALLERGIES: Darvon [propoxyphene]    Review of Systems   Constitutional: Negative for chills and fever. HENT: Negative for sore throat. Eyes: Negative for visual disturbance. Respiratory: Negative for shortness of breath.     Cardiovascular: Negative for palpitations. Gastrointestinal: Negative for vomiting. Genitourinary: Negative for dysuria. Musculoskeletal: Positive for back pain and gait problem. Negative for neck pain and neck stiffness. Skin: Negative for rash. Neurological: Negative for dizziness. Psychiatric/Behavioral: Negative for dysphoric mood. Vitals:    10/28/20 1349   BP: 132/78   Pulse: 70   Resp: 16   Temp: 98 °F (36.7 °C)   SpO2: 98%            Physical Exam  Vitals signs and nursing note reviewed. Constitutional:       General: She is not in acute distress. Appearance: Normal appearance. She is obese. She is not ill-appearing. HENT:      Head: Normocephalic and atraumatic. Right Ear: External ear normal.      Left Ear: External ear normal.      Nose: Nose normal.      Mouth/Throat:      Mouth: Mucous membranes are moist.      Pharynx: Oropharynx is clear. Eyes:      General: No scleral icterus. Extraocular Movements: Extraocular movements intact. Conjunctiva/sclera: Conjunctivae normal.      Pupils: Pupils are equal, round, and reactive to light. Neck:      Musculoskeletal: Normal range of motion and neck supple. No neck rigidity or muscular tenderness. Cardiovascular:      Rate and Rhythm: Normal rate and regular rhythm. Pulses: Normal pulses. Heart sounds: Normal heart sounds. Pulmonary:      Effort: Pulmonary effort is normal.      Breath sounds: Normal breath sounds. Abdominal:      General: Abdomen is flat. Bowel sounds are normal. There is no distension. Palpations: Abdomen is soft. Tenderness: There is no guarding. Musculoskeletal:      Lumbar back: She exhibits decreased range of motion, tenderness, bony tenderness and pain. She exhibits no deformity. Comments: 5/5 strength in bilateral lower extremities. Negative cross leg raise, positive straight leg raise. Sensation intact in bilateral lower extremities   Skin:     General: Skin is warm and dry. Coloration: Skin is not pale. Neurological:      General: No focal deficit present. Mental Status: She is alert and oriented to person, place, and time. Mental status is at baseline. Psychiatric:         Mood and Affect: Mood normal.         Behavior: Behavior normal.         Thought Content: Thought content normal.         Judgment: Judgment normal.          [facility]      VITAL SIGNS:  Patient Vitals for the past 4 hrs:   Temp Pulse Resp BP SpO2   10/28/20 1349 98 °F (36.7 °C) 70 16 132/78 98 %         LABS:  No results found for this or any previous visit (from the past 6 hour(s)). IMAGING:  CT SPINE LUMB WO CONT   Final Result   IMPRESSION:      1. L3-L4 new grade 1 anterolisthesis and increased now severe central spinal   canal stenosis and moderate bilateral foraminal stenoses. 2. L5-S1 mild right foraminal stenosis. 3. L4-S1 surgical fusion and facet ankylosis. No hardware complication. Medications During Visit:  Medications   HYDROcodone-acetaminophen (NORCO) 5-325 mg per tablet 1 Tab (has no administration in time range)   methocarbamoL (ROBAXIN) tablet 500 mg (has no administration in time range)   ibuprofen (MOTRIN) tablet 800 mg (has no administration in time range)         DECISION MAKING:  Mickey Rader is a 52 y.o. female who comes in as above. CT lumbar spine does show evidence of L3-L4 anterolisthesis and central canal stenosis and moderate bilateral foraminal stenosis. Other findings listed above and CT impression. She exhibits no red flag symptoms concerning for cauda equina syndrome/conus medullaris syndrome, but does exhibit symptoms consistent with sciatica. She is encouraged to take prednisone, muscle reaction, and hydrocodone as needed and to follow-up promptly with pain management specialists and orthopedics for long-term treatment options. Above results discussed and reviewed with the patient.   Patient verbalized understanding of the care plan, including any changes to current outpatient medication regimen, discussed disease process, symptom control, and follow-up care. IMPRESSION:  1. Lumbar radiculopathy        DISPOSITION:  Discharged      Current Discharge Medication List      START taking these medications    Details   predniSONE (DELTASONE) 50 mg tablet Take 1 Tab by mouth daily for 5 days. Qty: 5 Tab, Refills: 0      HYDROcodone-acetaminophen (Norco) 5-325 mg per tablet Take 1 Tab by mouth every six (6) hours as needed for Pain for up to 3 days. Max Daily Amount: 4 Tabs. Qty: 10 Tab, Refills: 0    Associated Diagnoses: Lumbar radiculopathy      metaxalone (Skelaxin) 800 mg tablet Take 1 Tab by mouth four (4) times daily as needed for Muscle Spasm(s) (muscle spasm) for up to 5 days. Qty: 20 Tab, Refills: 0              Follow-up Information     Follow up With Specialties Details Why Contact Info    List of pain management doctors provided        Your new primary care doctor        GENE Arce Physician Assistant  As needed, If symptoms worsen [address]  [address]  [facility]  [phone]              The patient is asked to follow-up with their primary care provider in the next several days. They are to call tomorrow for an appointment. The patient is asked to return promptly for any increased concerns or worsening of symptoms. They can return to this emergency department or any other emergency department.

## 2020-11-06 ENCOUNTER — TRANSCRIBE ORDER (OUTPATIENT)
Dept: SCHEDULING | Age: 50
End: 2020-11-06

## 2020-11-06 DIAGNOSIS — M43.16 SPONDYLOLISTHESIS OF LUMBAR REGION: ICD-10-CM

## 2020-11-06 DIAGNOSIS — G89.29 CHRONIC RIGHT-SIDED LOW BACK PAIN WITH RIGHT-SIDED SCIATICA: ICD-10-CM

## 2020-11-06 DIAGNOSIS — M54.50 LUMBAR PAIN: Primary | ICD-10-CM

## 2020-11-06 DIAGNOSIS — M48.062 SPINAL STENOSIS, LUMBAR REGION, WITH NEUROGENIC CLAUDICATION: ICD-10-CM

## 2020-11-06 DIAGNOSIS — M54.41 CHRONIC RIGHT-SIDED LOW BACK PAIN WITH RIGHT-SIDED SCIATICA: ICD-10-CM

## 2020-11-06 DIAGNOSIS — M47.26 OSTEOARTHRITIS OF SPINE WITH RADICULOPATHY, LUMBAR REGION: ICD-10-CM

## 2020-11-06 DIAGNOSIS — M54.32 BILATERAL SCIATICA: ICD-10-CM

## 2020-11-06 DIAGNOSIS — Z98.1 S/P LUMBAR FUSION: ICD-10-CM

## 2020-11-06 DIAGNOSIS — M54.31 BILATERAL SCIATICA: ICD-10-CM

## 2020-11-13 ENCOUNTER — HOSPITAL ENCOUNTER (OUTPATIENT)
Dept: MRI IMAGING | Age: 50
Discharge: HOME | End: 2020-11-13
Attending: ORTHOPAEDIC SURGERY
Payer: MEDICARE

## 2020-11-13 DIAGNOSIS — M43.16 SPONDYLOLISTHESIS OF LUMBAR REGION: ICD-10-CM

## 2020-11-13 DIAGNOSIS — M54.31 BILATERAL SCIATICA: ICD-10-CM

## 2020-11-13 DIAGNOSIS — G89.29 CHRONIC RIGHT-SIDED LOW BACK PAIN WITH RIGHT-SIDED SCIATICA: ICD-10-CM

## 2020-11-13 DIAGNOSIS — Z98.1 S/P LUMBAR FUSION: ICD-10-CM

## 2020-11-13 DIAGNOSIS — M54.41 CHRONIC RIGHT-SIDED LOW BACK PAIN WITH RIGHT-SIDED SCIATICA: ICD-10-CM

## 2020-11-13 DIAGNOSIS — M47.26 OSTEOARTHRITIS OF SPINE WITH RADICULOPATHY, LUMBAR REGION: ICD-10-CM

## 2020-11-13 DIAGNOSIS — M48.062 SPINAL STENOSIS, LUMBAR REGION, WITH NEUROGENIC CLAUDICATION: ICD-10-CM

## 2020-11-13 DIAGNOSIS — M54.50 LUMBAR PAIN: ICD-10-CM

## 2020-11-13 DIAGNOSIS — M54.32 BILATERAL SCIATICA: ICD-10-CM

## 2020-11-13 PROCEDURE — A9575 INJ GADOTERATE MEGLUMI 0.1ML: HCPCS | Performed by: ORTHOPAEDIC SURGERY

## 2020-11-13 PROCEDURE — 74011250636 HC RX REV CODE- 250/636: Performed by: ORTHOPAEDIC SURGERY

## 2020-11-13 PROCEDURE — 72158 MRI LUMBAR SPINE W/O & W/DYE: CPT

## 2020-11-13 RX ORDER — GADOTERATE MEGLUMINE 376.9 MG/ML
20 INJECTION INTRAVENOUS
Status: COMPLETED | OUTPATIENT
Start: 2020-11-13 | End: 2020-11-13

## 2020-11-13 RX ADMIN — GADOTERATE MEGLUMINE 20 ML: 376.9 INJECTION INTRAVENOUS at 18:50

## 2021-01-11 ENCOUNTER — HOSPITAL ENCOUNTER (OUTPATIENT)
Dept: PREADMISSION TESTING | Age: 51
Discharge: HOME | End: 2021-01-11
Payer: MEDICARE

## 2021-01-11 ENCOUNTER — HOSPITAL ENCOUNTER (OUTPATIENT)
Dept: GENERAL RADIOLOGY | Age: 51
Discharge: HOME | End: 2021-01-11
Attending: ORTHOPAEDIC SURGERY
Payer: MEDICARE

## 2021-01-11 VITALS
HEART RATE: 66 BPM | HEIGHT: 61 IN | TEMPERATURE: 98.3 F | DIASTOLIC BLOOD PRESSURE: 53 MMHG | WEIGHT: 251.77 LBS | RESPIRATION RATE: 16 BRPM | OXYGEN SATURATION: 100 % | SYSTOLIC BLOOD PRESSURE: 116 MMHG | BODY MASS INDEX: 47.53 KG/M2

## 2021-01-11 DIAGNOSIS — E11.65 TYPE 2 DIABETES MELLITUS WITH HYPERGLYCEMIA, UNSPECIFIED WHETHER LONG TERM INSULIN USE (HCC): Primary | ICD-10-CM

## 2021-01-11 LAB
ALBUMIN SERPL-MCNC: 3.7 G/DL (ref 3.5–5)
ALBUMIN/GLOB SERPL: 0.9 {RATIO} (ref 1.1–2.2)
ALP SERPL-CCNC: 88 U/L (ref 45–117)
ALT SERPL-CCNC: 34 U/L (ref 12–78)
AMPHET UR QL SCN: NEGATIVE
ANION GAP SERPL CALC-SCNC: 4 MMOL/L (ref 5–15)
APPEARANCE UR: CLEAR
AST SERPL-CCNC: 17 U/L (ref 15–37)
BACTERIA URNS QL MICRO: NEGATIVE /HPF
BARBITURATES UR QL SCN: NEGATIVE
BENZODIAZ UR QL: NEGATIVE
BILIRUB SERPL-MCNC: 0.3 MG/DL (ref 0.2–1)
BILIRUB UR QL: NEGATIVE
BUN SERPL-MCNC: 18 MG/DL (ref 6–20)
BUN/CREAT SERPL: 24 (ref 12–20)
CALCIUM SERPL-MCNC: 9.4 MG/DL (ref 8.5–10.1)
CANNABINOIDS UR QL SCN: NEGATIVE
CHLORIDE SERPL-SCNC: 100 MMOL/L (ref 97–108)
CO2 SERPL-SCNC: 29 MMOL/L (ref 21–32)
COCAINE UR QL SCN: NEGATIVE
COLOR UR: NORMAL
CREAT SERPL-MCNC: 0.76 MG/DL (ref 0.55–1.02)
DRUG SCRN COMMENT,DRGCM: NORMAL
EPITH CASTS URNS QL MICRO: NORMAL /LPF
ERYTHROCYTE [DISTWIDTH] IN BLOOD BY AUTOMATED COUNT: 14.5 % (ref 11.5–14.5)
GLOBULIN SER CALC-MCNC: 4 G/DL (ref 2–4)
GLUCOSE SERPL-MCNC: 122 MG/DL (ref 65–100)
GLUCOSE UR STRIP.AUTO-MCNC: NEGATIVE MG/DL
HCT VFR BLD AUTO: 39.1 % (ref 35–47)
HGB BLD-MCNC: 12.4 G/DL (ref 11.5–16)
HGB UR QL STRIP: NEGATIVE
HYALINE CASTS URNS QL MICRO: NORMAL /LPF (ref 0–5)
INR PPP: 1 (ref 0.9–1.1)
KETONES UR QL STRIP.AUTO: NEGATIVE MG/DL
LEUKOCYTE ESTERASE UR QL STRIP.AUTO: NEGATIVE
MCH RBC QN AUTO: 28.2 PG (ref 26–34)
MCHC RBC AUTO-ENTMCNC: 31.7 G/DL (ref 30–36.5)
MCV RBC AUTO: 88.9 FL (ref 80–99)
METHADONE UR QL: NEGATIVE
NITRITE UR QL STRIP.AUTO: NEGATIVE
NRBC # BLD: 0 K/UL (ref 0–0.01)
NRBC BLD-RTO: 0 PER 100 WBC
OPIATES UR QL: NEGATIVE
PCP UR QL: NEGATIVE
PH UR STRIP: 6 [PH] (ref 5–8)
PLATELET # BLD AUTO: 312 K/UL (ref 150–400)
PMV BLD AUTO: 10.8 FL (ref 8.9–12.9)
POTASSIUM SERPL-SCNC: 4.8 MMOL/L (ref 3.5–5.1)
PREALB SERPL-MCNC: 31.1 MG/DL (ref 20–40)
PROT SERPL-MCNC: 7.7 G/DL (ref 6.4–8.2)
PROT UR STRIP-MCNC: NEGATIVE MG/DL
PROTHROMBIN TIME: 10.6 SEC (ref 9–11.1)
RBC # BLD AUTO: 4.4 M/UL (ref 3.8–5.2)
RBC #/AREA URNS HPF: NORMAL /HPF (ref 0–5)
SODIUM SERPL-SCNC: 133 MMOL/L (ref 136–145)
SP GR UR REFRACTOMETRY: 1.01 (ref 1–1.03)
UA: UC IF INDICATED,UAUC: NORMAL
UROBILINOGEN UR QL STRIP.AUTO: 0.2 EU/DL (ref 0.2–1)
WBC # BLD AUTO: 11 K/UL (ref 3.6–11)
WBC URNS QL MICRO: NORMAL /HPF (ref 0–4)

## 2021-01-11 PROCEDURE — 85027 COMPLETE CBC AUTOMATED: CPT

## 2021-01-11 PROCEDURE — 97161 PT EVAL LOW COMPLEX 20 MIN: CPT

## 2021-01-11 PROCEDURE — 97116 GAIT TRAINING THERAPY: CPT

## 2021-01-11 PROCEDURE — 81001 URINALYSIS AUTO W/SCOPE: CPT

## 2021-01-11 PROCEDURE — 86901 BLOOD TYPING SEROLOGIC RH(D): CPT

## 2021-01-11 PROCEDURE — 83036 HEMOGLOBIN GLYCOSYLATED A1C: CPT

## 2021-01-11 PROCEDURE — 82306 VITAMIN D 25 HYDROXY: CPT

## 2021-01-11 PROCEDURE — 84134 ASSAY OF PREALBUMIN: CPT

## 2021-01-11 PROCEDURE — 80307 DRUG TEST PRSMV CHEM ANLYZR: CPT

## 2021-01-11 PROCEDURE — 80053 COMPREHEN METABOLIC PANEL: CPT

## 2021-01-11 PROCEDURE — 71046 X-RAY EXAM CHEST 2 VIEWS: CPT

## 2021-01-11 PROCEDURE — 36415 COLL VENOUS BLD VENIPUNCTURE: CPT

## 2021-01-11 PROCEDURE — 85610 PROTHROMBIN TIME: CPT

## 2021-01-11 RX ORDER — PROPRANOLOL HYDROCHLORIDE 20 MG/1
20 TABLET ORAL 3 TIMES DAILY
Status: ON HOLD | COMMUNITY
End: 2021-01-21 | Stop reason: SDUPTHER

## 2021-01-11 RX ORDER — TEMAZEPAM 30 MG/1
30 CAPSULE ORAL
COMMUNITY

## 2021-01-11 RX ORDER — CLONAZEPAM 0.5 MG/1
0.25 TABLET ORAL
COMMUNITY
End: 2021-07-08

## 2021-01-11 RX ORDER — LAMOTRIGINE 150 MG/1
150 TABLET ORAL 2 TIMES DAILY
COMMUNITY

## 2021-01-11 RX ORDER — CLONAZEPAM 0.5 MG/1
0.5 TABLET ORAL 2 TIMES DAILY
COMMUNITY
End: 2021-07-08

## 2021-01-11 RX ORDER — LITHIUM CARBONATE 300 MG/1
600 TABLET, FILM COATED, EXTENDED RELEASE ORAL
COMMUNITY

## 2021-01-11 RX ORDER — CHOLECALCIFEROL (VITAMIN D3) 125 MCG
5 CAPSULE ORAL
COMMUNITY

## 2021-01-11 RX ORDER — LISINOPRIL 10 MG/1
10 TABLET ORAL DAILY
COMMUNITY

## 2021-01-11 RX ORDER — MELATONIN
1000 DAILY
COMMUNITY

## 2021-01-11 RX ORDER — TRAZODONE HYDROCHLORIDE 100 MG/1
200 TABLET ORAL
COMMUNITY
End: 2021-07-08

## 2021-01-11 RX ORDER — INSULIN HUMAN 100 [IU]/ML
INJECTION, SUSPENSION SUBCUTANEOUS
COMMUNITY

## 2021-01-11 RX ORDER — IBUPROFEN 800 MG/1
800 TABLET ORAL
COMMUNITY
End: 2021-01-21

## 2021-01-11 NOTE — PERIOP NOTES
[facility]  Joint/Spine Preoperative Instructions        Surgery Date 01/18/21          Time of Arrival 1:00 pm Contact # [phone]  Covid Test Scheduled for Thursday 01/14/21- come between 7 am and 1145 am     1. On the day of your surgery, please report to the Surgical Services Registration Desk and sign in at your designated time. The Surgery Center is located to the right of the Emergency Room. 2. You must have someone with you to drive you home. You should not drive a car for 24 hours following surgery. Please make arrangements for a friend or family member to stay with you for the first 24 hours after your surgery. 3. No food after midnight. Medications morning of surgery should be taken with a sip of water. 4. We recommend you do not drink any alcoholic beverages for 24 hours before and after your surgery. 5. Contact your surgeons office for instructions on the following medications: non-steroidal anti-inflammatory drugs (i.e. Advil, Aleve), vitamins, and supplements. (Some surgeons will want you to stop these medications prior to surgery and others may allow you to take them)  **If you are currently taking Plavix, Coumadin, Aspirin and/or other blood-thinning agents, contact your surgeon for instructions. ** Your surgeon will partner with the physician prescribing these medications to determine if it is safe to stop or if you need to continue taking. Please do not stop taking these medications without instructions from your surgeon    6. Wear comfortable clothes. Wear glasses instead of contacts. Do not bring any money or jewelry. Please bring picture ID, insurance card, and any prearranged co-payment or hospital payment. Do not wear make-up, particularly mascara the morning of your surgery. Do not wear nail polish, particularly if you are having foot /hand surgery. Wear your hair loose or down, no ponytails, buns, serena pins or clips.   All body piercings must be removed. Please shower with antibacterial soap for three consecutive days before and on the morning of surgery, but do not apply any lotions, powders or deodorants after the shower on the day of surgery. Please use a fresh towels after each shower. Please sleep in clean clothes and change bed linens the night before surgery. Please do not shave for 48 hours prior to surgery. Shaving of the face is acceptable. 7. You should understand that if you do not follow these instructions your surgery may be cancelled. If your physical condition changes (I.e. fever, cold or flu) please contact your surgeon as soon as possible. 8. It is important that you be on time. If a situation occurs where you may be late, please call [phone] (OR Holding Area). 9. If you have any questions and or problems, please call [phone] (Pre-admission Testing). 10. Your surgery time may be subject to change. You will receive a phone call the evening prior if your time changes. 11.  If having outpatient surgery, you must have someone to drive you here, stay with you during the duration of your stay, and to drive you home at time of discharge. 12. The following link is for the educational video for patients and/or families. http://nava-chavez.org/. [email or web address]/[email or web address]-materials    Special Instructions:       TAKE ALL MEDICATIONS THE DAY OF SURGERY EXCEPT:no vitamins or supplements, no humulin insulin, lisinopril, metformin,       I understand a pre-operative phone call will be made to verify my surgery time. In the event that I am not available, I give permission for a message to be left on my answering service and/or with another person?   yes         ___________________      __________   _________    (Signature of Patient)             (Witness)                (Date and Time)

## 2021-01-11 NOTE — PERIOP NOTES
Hibiclens/Chlorhexidine    Preventing Infections Before and After - Your Surgery    IMPORTANT INSTRUCTIONS    Please read and follow these instructions carefully. If you are unable to comply with the below instructions your procedure will be cancelled. Every Night for Three (3) nights before your surgery:  1. Shower with an antibacterial soap, such as Dial, or the soap provided at your preassessment appointment. A shower is better than a bath for cleaning your skin. 2. If needed, ask someone to help you reach all areas of your body. Dont forget to clean your belly button with every shower. The night before your surgery: If you lose your Hibiclens/chlorhexidine please contact surgery center or you can purchase it at a local pharmacy  1. On the night before your surgery, shower with an antibacterial soap, such as Dial, or the soap provided at your preassessment appointment. 2. With one packet of Hibiclens/Chlorhexidine in hand, turn water off.  3. Apply Hibiclens antiseptic skin cleanser with a clean, freshly washed washcloth. ? Gently apply to your body from chin to toes (except the genital area) and especially the area(s) where your incision(s) will be. ? Leave Hibiclens/Chlorhexidine on your skin for at least 20 seconds. CAUTION: If needed, Hibiclens/chlorhexidine may be used to clean the folds of skin of the legs (such as in the area of the groin) and on your buttocks and hips. However, do not use Hibiclens/Chlorhexidine above the neck or in the genital area (your bottom) or put inside any area of your body. 4. Turn the water back on and rinse. 5. Dry gently with a clean, freshly washed towel. 6. After your shower, do not use any powder, deodorant, perfumes or lotion. 7. Use clean, freshly washed towels and washcloths every time you shower. 8. Wear clean, freshly washed pajamas to bed the night before surgery. 9. Sleep on clean, freshly washed sheets.   10. Do not allow pets to sleep in your bed with you. The Morning of your surgery:  1. Shower again thoroughly with an antibacterial soap, such as Dial or the soap provided at your preassessment appointment. If needed, ask someone for help to reach all areas of your body. Dont forget to clean your belly button! Rinse. 2. Dry gently with a clean, freshly washed towel. 3. After your shower, do not use any powder, deodorant, perfumes or lotion prior to surgery. 4. Put on clean, freshly washed clothing. Tips to help prevent infections after your surgery:  1. Protect your surgical wound from germs:  ? Hand washing is the most important thing you and your caregivers can do to prevent infections. ? Keep your bandage clean and dry! ? Do not touch your surgical wound. 2. Use clean, freshly washed towels and washcloths every time you shower; do not share bath linens with others. 3. Until your surgical wound is healed, wear clothing and sleep on bed linens each day that are clean and freshly washed. 4. Do not allow pets to sleep in your bed with you or touch your surgical wound. 5. Do not smoke - smoking delays wound healing. This may be a good time to stop smoking. 6. If you have diabetes, it is important for you to manage your blood sugar levels properly before your surgery as well as after your surgery. Poorly managed blood sugar levels slow down wound healing and prevent you from healing completely. If you lose your Hibiclens/chlorhexidine, please call the [facility], or it is available for purchase at your pharmacy.                ___________________      ___________________      ________________  (Signature of Patient)          (Witness)                   (Date and Time)

## 2021-01-11 NOTE — PROGRESS NOTES
[facility]  Physical Therapy Pre-surgery evaluation  [address]  [place], [address] [facility]    PHYSICAL THERAPY PRE SPINE SURGERY EVALUATION  Patient:Jamila Warner (48 y.o. female)  Date: 1/11/2021    pat  Procedure(s) (LRB):  L3-4 LATERAL FUSION (CHOICE ANESTH) (N/A)     Precautions:          ASSESSMENT :  Based on the objective data described below, the patient presents with impaired balance, increased pain, limited ambulation, and RLE pain/numbness due to end stage degenerative joint disease in the spinal level: lumbar spine. Discussed anticipated disposition to home with possible discharge within a 1 to 2 day time frame post-surgery. Patient and  in agreement. Anticipate patient will need acute PT and OT orders based on staged spinal surgery, pain and current deficits. GOALS: (Goals have been discussed and agreed upon with patient.)  DISCHARGE GOALS: Time Frame: 1 DAY  1. Patient will demonstrate increased strength, range of motion, and pain control via a home exercise program in order to minimize functional deficits in preparation for their upcoming surgery. This will be achieved by using education, demonstration and through the use of an informational handout including a home exercise program.  REHABILITATION POTENTIAL FOR STATED GOALS: Good     RECOMMENDATIONS AND PLANNED INTERVENTIONS: (Benefits and precautions of physical therapy have been discussed with the patient.)  · Home Exercise Program  TREATMENT PLAN EFFECTIVE DATES: 1/11/2021 to 1/11/2021   FREQUENCY/DURATION: Patient to continue to perform home exercise program at least twice daily until surgery. SUBJECTIVE:   Patient stated I definitely cannot walk that far in the hallway.     OBJECTIVE DATA SUMMARY:   HISTORY:      Past Medical History:   Diagnosis Date    [facility]     Dissociative identity disorder [facility]     Hyperlipidemia     Hypertension     Morbid obesity ([facility]  NIDDM (non-insulin dependent diabetes mellitus)     type 2    Psychiatric disorder     Depression/Anxiety    PTSD (post-traumatic stress disorder)      Past Surgical History:   Procedure Laterality Date    DELIVERY       HX BACK SURGERY      HX BREAST BIOPSY Left     u/s benign biopsy 3 years ago    HX CHOLECYSTECTOMY      HX TONSILLECTOMY      HX TUBAL LIGATION      RI REPAIR ACHILLES TENDON,PRIMARY         Prior Level of Function/Home Situation: patient lives with her . She reports he will assist her at night and her mother in law (elderly) will assist her during the day. Her mother in law just had shoulder surgery. Patient is independent with all aspects of functional mobility and ADLS although very limited gait distance due to pain. Denies any falls. Does not work. Personal factors and/or comorbidities impacting plan of care:       Home Situation  Home Environment: Private residence  # Steps to Enter: 2(very steep )  Rails to [employer]: No  One/Two Story Residence: One story  Living Alone: No  Support Systems: Spouse/Significant Other/Partner, Family member(s)  Patient Expects to be Discharged to[de-identified] Private residence  Current DME Used/Available at Home: Brace/Splint  Tub or Shower Type: Shower           EXAMINATION/PRESENTATION/DECISION MAKING:     ADLs (Current Functional Status):    Bathing/Showering:   [x] Independent  [] Requires Assistance from Someone  [] Sponge Bath Only   Ambulation:  [x] Independent  [] Walk Indoors Only  [] Walk Outdoors  [] Use Assistive Device  [] Use Wheelchair Only     Dressing:  [x] Independent    Requires Assistance from Someone for:  [] Sock/Shoes  [] Pants  [] Everything   Household Activities:  [x] Routine house and yard work  [x] Light Housework Only  [] None       Critical Behavior:                Strength:     Strength: Generally decreased, functional                       Tone & Sensation:   Tone: Normal              Sensation: Impaired Range Of Motion:     AROM: Generally decreased, functional           PROM: Within functional limits                Coordination:   Coordination: Within functional limits    Functional Mobility:  Transfers:  Sit to Stand: Independent  Stand to Sit: Independent                       Balance:   Sitting: Intact  Standing: Intact  Ambulation/Gait Training:  Distance (ft): 100 Feet (ft)     Ambulation - Level of Assistance: Independent     Gait Description (WDL): Exceptions to WDL  Gait Abnormalities: Antalgic, Shuffling gait        Base of Support: Widened     Speed/Stacy: Pace decreased (<100 feet/min)  Step Length: Right shortened, Left shortened           Patient unable to perform 10 meter walk test due to pain and walking limitation  Functional Measure:  Barthel Index:    Bathin  Bladder: 10  Bowels: 10  Groomin  Dressing: 10  Feeding: 10  Mobility: 15  Stairs: 10  Toilet Use: 10  Transfer (Bed to Chair and Back): 15  Total: 100/100       The Barthel ADL Index: Guidelines  1. The index should be used as a record of what a patient does, not as a record of what a patient could do. 2. The main aim is to establish degree of independence from any help, physical or verbal, however minor and for whatever reason. 3. The need for supervision renders the patient not independent. 4. A patient's performance should be established using the best available evidence. Asking the patient, friends/relatives and nurses are the usual sources, but direct observation and common sense are also important. However direct testing is not needed. 5. Usually the patient's performance over the preceding 24-48 hours is important, but occasionally longer periods will be relevant. 6. Middle categories imply that the patient supplies over 50 per cent of the effort. 7. Use of aids to be independent is allowed. Christel Verma., Barthel, D.W. (4332). Functional evaluation: the Barthel Index. [address] [facility] (14)2.   Burke Ibarra [facility], STEWART JACKMAN. Karina Del Rio., Saloni Holland., Desiree Masters (1999). Measuring the change indisability after inpatient rehabilitation; comparison of the responsiveness of the Barthel Index and Functional Garrard Measure. Journal of Neurology, Neurosurgery, and Psychiatry, 66(4), 102-535. CHRISTOFER Restrepo, LURDES Muniz, & Martha Max M.A. (2004.) Assessment of post-stroke quality of life in cost-effectiveness studies: The usefulness of the Barthel Index and the EuroQoL-5D. Quality of Life Research, 13, 427-43       Pain:  Pain Scale 1: Numeric (0 - 10)  Pain Intensity 1: 10  Pain Location 1: Back;Leg  Pain Orientation 1: Lower;Left;Right  Pain Description 1: Aching;Numb;Tingling       Radicular pain:   Reports RLE pain down to calf, pain now crossing over to LLE mildly. Reports R anterior thigh numbness    Activity Tolerance:   Good. Patient []   does  [x]   does not demonstrate signs/symptoms of shortness of breath/dyspnea on exertion/respiratory distress. COMMUNICATION/EDUCATION:   The patient was educated on:  [x]         Early post operative mobility is imperative to achieve a patient's desired outcomes and to restore biological function. [x]         Post operative spinal precautions may/may not be applicable. These precautions are based on the patient's physician and the procedure(s) performed.     [x]         Spinal precautions including:  ·   No bending forward, sideways, or backwards  ·   No twisting   ·   No lifting more than 5-10 pounds  ·   No sitting longer than 30-60 minutes at a time  ·   Loc brace when out of bed and mobilizing    The patients plan of care was discussed as follows:   [x]         The patient verbalized understanding of his/her plan in preparation for their upcoming surgery  []         The patient's  was present for this session  []        The patient reports that he/she does not have a  identified at this time  []         The  verbalized understanding of the education regarding the patient's upcoming surgery  [x]         Patient/family agree to work toward stated goals and plan of care. []         Patient understands intent and goals of therapy, but is neutral about his/her participation. []         Patient is unable to participate in goal setting and plan of care.       Thank you for this referral.  Raymond Soto, PT, DPT    Time Calculation: 24 mins

## 2021-01-11 NOTE — PERIOP NOTES
Incentive Spirometer        Using the incentive spirometer helps expand the small air sacs of your lungs, helps you breathe deeply, and helps improve your lung function. Use your incentive spirometer twice a day (10 breaths each time) prior to surgery. How to Use Your Incentive Spirometer:  1. Hold the incentive spirometer in an upright position. 2. Breathe out as usual.   3. Place the mouthpiece in your mouth and seal your lips tightly around it. 4. Take a deep breath. Breathe in slowly and as deeply as possible. Keep the blue flow rate guide between the arrows. 5. Hold your breath as long as possible. Then exhale slowly and allow the piston to fall to the bottom of the column. 6. Rest for a few seconds and repeat steps one through five at least 10 times. PAT Tidal Volume__2000________________  x_2_______________  Date__01/11/21_____________________    Estela Bussing THE INCENTIVE SPIROMETER WITH YOU TO THE HOSPITAL ON THE DAY OF YOUR SURGERY. Opportunity given to ask and answer questions as well as to observe return demonstration.     Patient signature_____________________________    Witness____________________________

## 2021-01-12 LAB
25(OH)D3 SERPL-MCNC: 34 NG/ML (ref 30–100)
ABO + RH BLD: NORMAL
BACTERIA SPEC CULT: NORMAL
BACTERIA SPEC CULT: NORMAL
BLOOD GROUP ANTIBODIES SERPL: NORMAL
EST. AVERAGE GLUCOSE BLD GHB EST-MCNC: 169 MG/DL
HBA1C MFR BLD: 7.5 % (ref 4–5.6)
SERVICE CMNT-IMP: NORMAL
SPECIMEN EXP DATE BLD: NORMAL

## 2021-01-12 NOTE — PERIOP NOTES
Covid Test Scheduled. Patient instructed to self isolate after Covid Swab Test per Recommendations. Spine Booklet given to patient and reviewed with patient. Patient verbalizes understanding. Opportunity provided to answer questions.

## 2021-01-12 NOTE — ADVANCED PRACTICE NURSE
PAT Nurse Practitioner   Pre-Operative Chart Review/Assessment:-ORTHOPEDIC/NEUROSURGICAL SPINE                Patient Name:  Josr Prince                                                         Age:   52 y.o.    :  1970     Today's Date:  2021     Date of PAT:   21      Date of Surgery:    21      Procedure(s):    L3-4 lateral fusion (21) and L3-4 posterior fusion (21)     Surgeon:   Sav Muro     Medical Clearance:  Clearance by [facility] (PCP is Dr. Curtis Escobar)                   PLAN:      1)  Cardiac Clearance:  Not requested       2)  Diabetic Treatment Consult:  Ordered-A1C 7.5 (known diabetic)      3)  Sleep Apnea evaluation:   Not indicated-SAVANA 3       4) Treatment for MRSA/Staph Aureus:  Negative      5) Additional Concerns:  BMI 47.  Bipolar disorder, PTSD, T2DM                Vital Signs:         Vitals:    21 1611   BP: (!) 116/53   Pulse: 66   Resp: 16   Temp: 98.3 °F (36.8 °C)   SpO2: 100%   Weight: 114.2 kg (251 lb 12.3 oz)   Height: 5' 1\" (1.549 m)            ____________________________________________  PAST MEDICAL HISTORY  Past Medical History:   Diagnosis Date    [facility] ([facility] [address]     Dissociative identity disorder ([facility] [address]     Hyperlipidemia     Hypertension     Morbid obesity ([facility] [address]     NIDDM (non-insulin dependent diabetes mellitus)     type 2    Psychiatric disorder     Depression/Anxiety    PTSD (post-traumatic stress disorder)       ____________________________________________  PAST SURGICAL HISTORY  Past Surgical History:   Procedure Laterality Date    DELIVERY       HX BACK SURGERY      HX BREAST BIOPSY Left     u/s benign biopsy 3 years ago    HX CHOLECYSTECTOMY      HX TONSILLECTOMY      HX TUBAL LIGATION      LA REPAIR ACHILLES TENDON,PRIMARY        ____________________________________________  HOME MEDICATIONS    Current Outpatient Medications   Medication Sig    ibuprofen (MOTRIN) 800 mg tablet Take 800 mg by mouth three (3) times daily as needed for Pain.  temazepam (RESTORIL) 30 mg capsule Take 30 mg by mouth nightly.  lithium carbonate SR (LITHOBID) 300 mg CR tablet Take 600 mg by mouth nightly.  melatonin 5 mg tablet Take 5 mg by mouth nightly.  clonazePAM (KlonoPIN) 0.5 mg tablet Take 0.5 mg by mouth two (2) times a day. 1 tab at breakfast and 1 tab in the afternoon and 1/2 tablet at bedtime    traZODone (DESYREL) 100 mg tablet Take 200 mg by mouth nightly. 2 tab qhs= 200 mg    lisinopriL (PRINIVIL, ZESTRIL) 10 mg tablet Take 10 mg by mouth daily.  propranoloL (INDERAL) 20 mg tablet Take 20 mg by mouth three (3) times daily.  lamoTRIgine (LaMICtal) 150 mg tablet Take 150 mg by mouth two (2) times a day.  clonazePAM (KlonoPIN) 0.5 mg tablet Take 0.25 mg by mouth nightly. 1 tab at breakfast and 1 tab in the afternoon and 1/2 tablet at bedtime    cholecalciferol (Vitamin D3) (1000 Units /25 mcg) tablet Take 1,000 Units by mouth daily.  insulin nph-regular human rec (HumuLIN 70/30 U-100 KwikPen) 100 unit/mL (70-30) inpn by SubCUTAneous route Before breakfast, lunch, and dinner. Sliding scale    atorvastatin (LIPITOR) 40 mg tablet Take 40 mg by mouth daily.  baclofen (LIORESAL) 10 mg tablet Take 10 mg by mouth two (2) times a day.  gabapentin (NEURONTIN) 300 mg capsule Take 600 mg by mouth three (3) times daily.  metFORMIN (GLUCOPHAGE) 1,000 mg tablet Take 1,000 mg by mouth two (2) times daily (with meals).  multivitamin (ONE A DAY) tablet Take 1 Tab by mouth daily.      No current facility-administered medications for this encounter.       ____________________________________________  ALLERGIES  Allergies   Allergen Reactions    Darvon [Propoxyphene] Rash    Oxcarbazepine Other (comments)     Hyponatremia (severe)      ____________________________________________  SOCIAL HISTORY  Social History     Tobacco Use    Smoking status: Never Smoker  Smokeless tobacco: Never Used   Substance Use Topics    Alcohol use: No      ____________________________________________        Labs:     Hospital Outpatient Visit on 01/11/2021   Component Date Value Ref Range Status    WBC 01/11/2021 11.0  3.6 - 11.0 K/uL Final    RBC 01/11/2021 4.40  3.80 - 5.20 M/uL Final    HGB 01/11/2021 12.4  11.5 - 16.0 g/dL Final    HCT 01/11/2021 39.1  35.0 - 47.0 % Final    MCV 01/11/2021 88.9  80.0 - 99.0 FL Final    MCH 01/11/2021 28.2  26.0 - 34.0 PG Final    MCHC 01/11/2021 31.7  30.0 - 36.5 g/dL Final    RDW 01/11/2021 14.5  11.5 - 14.5 % Final    PLATELET 50/89/3532 410  150 - 400 K/uL Final    MPV 01/11/2021 10.8  8.9 - 12.9 FL Final    NRBC 01/11/2021 0.0  0  WBC Final    ABSOLUTE NRBC 01/11/2021 0.00  0.00 - 0.01 K/uL Final    Special Requests: 01/11/2021 NO SPECIAL REQUESTS    Final    Culture result: 01/11/2021 MRSA NOT PRESENT    Final    Culture result: 01/11/2021     Screening of patient nares for MRSA is for surveillance purposes and, if positive, to facilitate isolation considerations in high risk settings. It is not intended for automatic decolonization interventions per se as regimens are not sufficiently effective to warrant routine use. Final    Hemoglobin A1c 01/11/2021 7.5* 4.0 - 5.6 % Final    Comment: NEW METHOD  PLEASE NOTE NEW REFERENCE RANGE  (NOTE)  HbA1C Interpretive Ranges  <5.7              Normal  5.7 - 6.4         Consider Prediabetes  >6.5              Consider Diabetes      Est. average glucose 01/11/2021 169  mg/dL Final    INR 01/11/2021 1.0  0.9 - 1.1   Final    A single therapeutic range for Vit K antagonists may not be optimal for all indications - see June, 2008 issue of Chest, American College of Chest Physicians Evidence-Based Clinical Practice Guidelines, 8th Edition.     Prothrombin time 01/11/2021 10.6  9.0 - 11.1 sec Final    Color 01/11/2021 YELLOW/STRAW    Final    Color Reference Range: Straw, Yellow or Dark Yellow    Appearance 01/11/2021 CLEAR  CLEAR   Final    Specific gravity 01/11/2021 1.011  1.003 - 1.030   Final    pH (UA) 01/11/2021 6.0  5.0 - 8.0   Final    Protein 01/11/2021 Negative  NEG mg/dL Final    Glucose 01/11/2021 Negative  NEG mg/dL Final    Ketone 01/11/2021 Negative  NEG mg/dL Final    Bilirubin 01/11/2021 Negative  NEG   Final    Blood 01/11/2021 Negative  NEG   Final    Urobilinogen 01/11/2021 0.2  0.2 - 1.0 EU/dL Final    Nitrites 01/11/2021 Negative  NEG   Final    Leukocyte Esterase 01/11/2021 Negative  NEG   Final    WBC 01/11/2021 0-4  0 - 4 /hpf Final    RBC 01/11/2021 0-5  0 - 5 /hpf Final    Epithelial cells 01/11/2021 FEW  FEW /lpf Final    Epithelial cell category consists of squamous cells and /or transitional urothelial cells. Renal tubular cells, if present, are separately identified as such.  Bacteria 01/11/2021 Negative  NEG /hpf Final    UA:UC IF INDICATED 01/11/2021 CULTURE NOT INDICATED BY UA RESULT  CNI   Final    Hyaline cast 01/11/2021 0-2  0 - 5 /lpf Final    Sodium 01/11/2021 133* 136 - 145 mmol/L Final    Potassium 01/11/2021 4.8  3.5 - 5.1 mmol/L Final    Chloride 01/11/2021 100  97 - 108 mmol/L Final    CO2 01/11/2021 29  21 - 32 mmol/L Final    Anion gap 01/11/2021 4* 5 - 15 mmol/L Final    Glucose 01/11/2021 122* 65 - 100 mg/dL Final    BUN 01/11/2021 18  6 - 20 MG/DL Final    Creatinine 01/11/2021 0.76  0.55 - 1.02 MG/DL Final    BUN/Creatinine ratio 01/11/2021 24* 12 - 20   Final    GFR est AA 01/11/2021 >60  >60 ml/min/1.73m2 Final    GFR est non-AA 01/11/2021 >60  >60 ml/min/1.73m2 Final    Estimated GFR is calculated using the IDMS-traceable Modification of Diet in Renal Disease (MDRD) Study equation, reported for both  Americans (GFRAA) and non- Americans (GFRNA), and normalized to 1.73m2 body surface area. The physician must decide which value applies to the patient.     Calcium 01/11/2021 9.4  8.5 - 10.1 MG/DL Final    Bilirubin, total 01/11/2021 0.3  0.2 - 1.0 MG/DL Final    ALT (SGPT) 01/11/2021 34  12 - 78 U/L Final    AST (SGOT) 01/11/2021 17  15 - 37 U/L Final    Alk. phosphatase 01/11/2021 88  45 - 117 U/L Final    Protein, total 01/11/2021 7.7  6.4 - 8.2 g/dL Final    Albumin 01/11/2021 3.7  3.5 - 5.0 g/dL Final    Globulin 01/11/2021 4.0  2.0 - 4.0 g/dL Final    A-G Ratio 01/11/2021 0.9* 1.1 - 2.2   Final    AMPHETAMINES 01/11/2021 Negative  NEG   Final    BARBITURATES 01/11/2021 Negative  NEG   Final    BENZODIAZEPINES 01/11/2021 Negative  NEG   Final    COCAINE 01/11/2021 Negative  NEG   Final    METHADONE 01/11/2021 Negative  NEG   Final    OPIATES 01/11/2021 Negative  NEG   Final    PCP(PHENCYCLIDINE) 01/11/2021 Negative  NEG   Final    THC (TH-CANNABINOL) 01/11/2021 Negative  NEG   Final    Drug screen comment 01/11/2021 (NOTE)   Final    Comment: This test is a screen for drugs of abuse in a medical setting only   (i.e., they are unconfirmed results and as such must not be used for   non-medical purposes e.g., employment testing, legal testing). Due to   its inherent nature, false positive (FP) and false negative (FN)   results may be obtained. Therefore, if necessary for medical care,   recommend confirmation of positive findings by GC/MS. The cutoff   values (i.e., the level at which this screening test becomes positive   for a given drug group) are:    Amphetamine/Methamphetamine: 300 ng/mL  Barbiturates:                200 ng/mL  Benzodiazepines:             200 ng/mL  Cocaine:                     150 ng/mL  Methadone:                   300 ng/mL  Opiates:                     300 ng/mL   Phencyclidine, PCP:           25 ng/mL  Marijuana, THC:               50 ng/mL    This screening test can identify the presence of the following drugs   when above the cutoff value; see list posted on the intranet.  It can   be viewed by marisol                           ecting in sequence the following from the [facility] home   page: Rickie; [id number] , Resources; [facility]   Laboratory, Physician Resources Q to Z; \"UDS (Urine Drug Screen   Automated) List of Detectable Drugs. \"     Or use web address:   [email or web address]/[place]/[facility]/Physician%20Resources/  UDS%20List%20of%20Detectable%20Drugs. pdf      Prealbumin 01/11/2021 31.1  20.0 - 40.0 mg/dL Final    Crossmatch Expiration 01/11/2021 01/21/2021,2359   Final    ABO/Rh(D) 01/11/2021 A POSITIVE   Final    Antibody screen 01/11/2021 NEG   Final    Vitamin D 25-Hydroxy 01/11/2021 34.0  30 - 100 ng/mL Final    Comment: (NOTE)  Deficiency               <20 ng/mL  Insufficiency          20-30 ng/mL  Sufficient             ng/mL  Possible toxicity       >100 ng/mL    The Method used is Siemens Advia Centaur currently standardized to a   Center of Disease Control and Prevention (CDC) certified reference   [address]. Samples containing fluorescein dye can produce falsely   elevated values when tested with the ADVIA Centaur Vitamin D Assay. It is recommended that results in the toxic range, >100 ng/mL, be   retested 72 hours post fluorescein exposure. Skin:   Denies open wounds, cuts, sores, rashes or other areas of concern in PAT assessment. Luis Antonio Ayala NP    A1C 7.5. Known diabetic on insulin and oral agents. DTC consult ordered.

## 2021-01-14 ENCOUNTER — HOSPITAL ENCOUNTER (OUTPATIENT)
Dept: PREADMISSION TESTING | Age: 51
Discharge: HOME | End: 2021-01-14
Payer: MEDICARE

## 2021-01-14 PROCEDURE — 87635 SARS-COV-2 COVID-19 AMP PRB: CPT

## 2021-01-15 LAB
HEALTH STATUS, XMCV2T: NORMAL
SARS-COV-2, COV2NT: NOT DETECTED
SOURCE, COVRS: NORMAL
SPECIMEN SOURCE, FCOV2M: NORMAL
SPECIMEN TYPE, XMCV1T: NORMAL

## 2021-01-18 ENCOUNTER — ANESTHESIA EVENT (OUTPATIENT)
Dept: SURGERY | Age: 51
DRG: 454 | End: 2021-01-18
Payer: MEDICARE

## 2021-01-18 ENCOUNTER — HOSPITAL ENCOUNTER (INPATIENT)
Age: 51
LOS: 3 days | Discharge: HOME HEALTH | DRG: 454 | End: 2021-01-21
Attending: ORTHOPAEDIC SURGERY | Admitting: ORTHOPAEDIC SURGERY
Payer: MEDICARE

## 2021-01-18 ENCOUNTER — APPOINTMENT (OUTPATIENT)
Dept: GENERAL RADIOLOGY | Age: 51
DRG: 454 | End: 2021-01-18
Attending: ORTHOPAEDIC SURGERY
Payer: MEDICARE

## 2021-01-18 ENCOUNTER — ANESTHESIA (OUTPATIENT)
Dept: SURGERY | Age: 51
DRG: 454 | End: 2021-01-18
Payer: MEDICARE

## 2021-01-18 ENCOUNTER — APPOINTMENT (OUTPATIENT)
Dept: CT IMAGING | Age: 51
DRG: 454 | End: 2021-01-18
Attending: ORTHOPAEDIC SURGERY
Payer: MEDICARE

## 2021-01-18 DIAGNOSIS — Z98.890 STATUS POST LUMBAR SPINE OPERATIVE PROCEDURE FOR DECOMPRESSION OF SPINAL CORD: Primary | ICD-10-CM

## 2021-01-18 PROBLEM — M48.061 SPINAL STENOSIS OF LUMBAR REGION AT MULTIPLE LEVELS: Status: ACTIVE | Noted: 2021-01-18

## 2021-01-18 LAB
GLUCOSE BLD STRIP.AUTO-MCNC: 143 MG/DL (ref 65–100)
GLUCOSE BLD STRIP.AUTO-MCNC: 149 MG/DL (ref 65–100)
GLUCOSE BLD STRIP.AUTO-MCNC: 238 MG/DL (ref 65–100)
SERVICE CMNT-IMP: ABNORMAL

## 2021-01-18 PROCEDURE — 77030026438 HC STYL ET INTUB CARD -A: Performed by: ANESTHESIOLOGY

## 2021-01-18 PROCEDURE — 77030034479 HC ADH SKN CLSR PRINEO J&J -B: Performed by: ORTHOPAEDIC SURGERY

## 2021-01-18 PROCEDURE — 74011250636 HC RX REV CODE- 250/636: Performed by: ANESTHESIOLOGY

## 2021-01-18 PROCEDURE — 77030014007 HC SPNG HEMSTAT J&J -B: Performed by: ORTHOPAEDIC SURGERY

## 2021-01-18 PROCEDURE — 77030040950 HC GRFT BN OSTEOAMP SELCT BTUS -H: Performed by: ORTHOPAEDIC SURGERY

## 2021-01-18 PROCEDURE — 74011250636 HC RX REV CODE- 250/636: Performed by: ORTHOPAEDIC SURGERY

## 2021-01-18 PROCEDURE — 77030010512 HC APPL CLP LIG J&J -C: Performed by: ORTHOPAEDIC SURGERY

## 2021-01-18 PROCEDURE — 76210000017 HC OR PH I REC 1.5 TO 2 HR: Performed by: ORTHOPAEDIC SURGERY

## 2021-01-18 PROCEDURE — 77030003028 HC SUT VCRL J&J -A: Performed by: ORTHOPAEDIC SURGERY

## 2021-01-18 PROCEDURE — 0SG00A0 FUSION OF LUMBAR VERTEBRAL JOINT WITH INTERBODY FUSION DEVICE, ANTERIOR APPROACH, ANTERIOR COLUMN, OPEN APPROACH: ICD-10-PCS | Performed by: ORTHOPAEDIC SURGERY

## 2021-01-18 PROCEDURE — 82962 GLUCOSE BLOOD TEST: CPT

## 2021-01-18 PROCEDURE — 0ST20ZZ RESECTION OF LUMBAR VERTEBRAL DISC, OPEN APPROACH: ICD-10-PCS | Performed by: ORTHOPAEDIC SURGERY

## 2021-01-18 PROCEDURE — 74011000250 HC RX REV CODE- 250: Performed by: NURSE ANESTHETIST, CERTIFIED REGISTERED

## 2021-01-18 PROCEDURE — 74011250636 HC RX REV CODE- 250/636

## 2021-01-18 PROCEDURE — 77030020704 HC DISECT ENDOSC BLNT J&J -B: Performed by: ORTHOPAEDIC SURGERY

## 2021-01-18 PROCEDURE — 2709999900 HC NON-CHARGEABLE SUPPLY: Performed by: ORTHOPAEDIC SURGERY

## 2021-01-18 PROCEDURE — C1713 ANCHOR/SCREW BN/BN,TIS/BN: HCPCS | Performed by: ORTHOPAEDIC SURGERY

## 2021-01-18 PROCEDURE — 77030018723 HC ELCTRD BLD COVD -A: Performed by: ORTHOPAEDIC SURGERY

## 2021-01-18 PROCEDURE — 65270000029 HC RM PRIVATE

## 2021-01-18 PROCEDURE — 76060000035 HC ANESTHESIA 2 TO 2.5 HR: Performed by: ORTHOPAEDIC SURGERY

## 2021-01-18 PROCEDURE — 77030003029 HC SUT VCRL J&J -B: Performed by: ORTHOPAEDIC SURGERY

## 2021-01-18 PROCEDURE — 74011000250 HC RX REV CODE- 250: Performed by: ORTHOPAEDIC SURGERY

## 2021-01-18 PROCEDURE — 77030018836 HC SOL IRR NACL ICUM -A: Performed by: ORTHOPAEDIC SURGERY

## 2021-01-18 PROCEDURE — 77030033138 HC SUT PGA STRATFX J&J -B: Performed by: ORTHOPAEDIC SURGERY

## 2021-01-18 PROCEDURE — 72131 CT LUMBAR SPINE W/O DYE: CPT

## 2021-01-18 PROCEDURE — 77030040356 HC CORD BPLR FRCP COVD -A: Performed by: ORTHOPAEDIC SURGERY

## 2021-01-18 PROCEDURE — 77030008684 HC TU ET CUF COVD -B: Performed by: ANESTHESIOLOGY

## 2021-01-18 PROCEDURE — 77030018846 HC SOL IRR STRL H20 ICUM -A: Performed by: ORTHOPAEDIC SURGERY

## 2021-01-18 PROCEDURE — 77030008462 HC STPLR SKN PROX J&J -A: Performed by: ORTHOPAEDIC SURGERY

## 2021-01-18 PROCEDURE — 77030040361 HC SLV COMPR DVT MDII -B: Performed by: ORTHOPAEDIC SURGERY

## 2021-01-18 PROCEDURE — 77030029099 HC BN WAX SSPC -A: Performed by: ORTHOPAEDIC SURGERY

## 2021-01-18 PROCEDURE — 74011250636 HC RX REV CODE- 250/636: Performed by: NURSE ANESTHETIST, CERTIFIED REGISTERED

## 2021-01-18 PROCEDURE — 72100 X-RAY EXAM L-S SPINE 2/3 VWS: CPT

## 2021-01-18 PROCEDURE — 07DR3ZZ EXTRACTION OF ILIAC BONE MARROW, PERCUTANEOUS APPROACH: ICD-10-PCS | Performed by: ORTHOPAEDIC SURGERY

## 2021-01-18 PROCEDURE — 77030019908 HC STETH ESOPH SIMS -A: Performed by: ANESTHESIOLOGY

## 2021-01-18 PROCEDURE — 77030041680 HC PNCL ELECSURG SMK EVAC CNMD -B: Performed by: ORTHOPAEDIC SURGERY

## 2021-01-18 PROCEDURE — 77030014647 HC SEAL FBRN TISSL BAXT -D: Performed by: ORTHOPAEDIC SURGERY

## 2021-01-18 PROCEDURE — 76010000171 HC OR TIME 2 TO 2.5 HR INTENSV-TIER 1: Performed by: ORTHOPAEDIC SURGERY

## 2021-01-18 PROCEDURE — 76000 FLUOROSCOPY <1 HR PHYS/QHP: CPT

## 2021-01-18 PROCEDURE — 77030003445 HC NDL BIOP BN BD -B: Performed by: ORTHOPAEDIC SURGERY

## 2021-01-18 PROCEDURE — 77030020268 HC MISC GENERAL SUPPLY: Performed by: ORTHOPAEDIC SURGERY

## 2021-01-18 PROCEDURE — 74011250637 HC RX REV CODE- 250/637: Performed by: ORTHOPAEDIC SURGERY

## 2021-01-18 PROCEDURE — 77030005513 HC CATH URETH FOL11 MDII -B: Performed by: ORTHOPAEDIC SURGERY

## 2021-01-18 PROCEDURE — 77030029251 HC SPCR SPN GLMB -K1: Performed by: ORTHOPAEDIC SURGERY

## 2021-01-18 DEVICE — GRAFT BONE 5 CC: Type: IMPLANTABLE DEVICE | Site: SPINE LUMBAR | Status: FUNCTIONAL

## 2021-01-18 DEVICE — RISE-L SPACER 22 X 50MM, 7-14MM, 3-15&DEG;
Type: IMPLANTABLE DEVICE | Site: SPINE LUMBAR | Status: FUNCTIONAL
Brand: RISE-L

## 2021-01-18 DEVICE — PLYMOUTH THORACOLUMBAR PLATE, L4-L5, 21MM
Type: IMPLANTABLE DEVICE | Site: SPINE LUMBAR | Status: FUNCTIONAL
Brand: PLYMOUTH

## 2021-01-18 DEVICE — 5.5MM VARIABLE ANGLE SCREW, 30MM
Type: IMPLANTABLE DEVICE | Site: SPINE LUMBAR | Status: FUNCTIONAL
Brand: TRUSS

## 2021-01-18 RX ORDER — SODIUM CHLORIDE 0.9 % (FLUSH) 0.9 %
5-40 SYRINGE (ML) INJECTION EVERY 8 HOURS
Status: DISCONTINUED | OUTPATIENT
Start: 2021-01-18 | End: 2021-01-18 | Stop reason: HOSPADM

## 2021-01-18 RX ORDER — MAGNESIUM SULFATE 100 %
4 CRYSTALS MISCELLANEOUS AS NEEDED
Status: DISCONTINUED | OUTPATIENT
Start: 2021-01-18 | End: 2021-01-21 | Stop reason: HOSPADM

## 2021-01-18 RX ORDER — ONDANSETRON 2 MG/ML
4 INJECTION INTRAMUSCULAR; INTRAVENOUS AS NEEDED
Status: DISCONTINUED | OUTPATIENT
Start: 2021-01-18 | End: 2021-01-18 | Stop reason: HOSPADM

## 2021-01-18 RX ORDER — ACETAMINOPHEN 325 MG/1
650 TABLET ORAL ONCE
Status: DISCONTINUED | OUTPATIENT
Start: 2021-01-18 | End: 2021-01-18 | Stop reason: HOSPADM

## 2021-01-18 RX ORDER — CLONAZEPAM 0.5 MG/1
0.25 TABLET ORAL
Status: DISCONTINUED | OUTPATIENT
Start: 2021-01-18 | End: 2021-01-21 | Stop reason: HOSPADM

## 2021-01-18 RX ORDER — CLONAZEPAM 0.5 MG/1
0.5 TABLET ORAL 2 TIMES DAILY
Status: DISCONTINUED | OUTPATIENT
Start: 2021-01-18 | End: 2021-01-21 | Stop reason: HOSPADM

## 2021-01-18 RX ORDER — DEXTROSE 50 % IN WATER (D50W) INTRAVENOUS SYRINGE
12.5-25 AS NEEDED
Status: DISCONTINUED | OUTPATIENT
Start: 2021-01-18 | End: 2021-01-21 | Stop reason: HOSPADM

## 2021-01-18 RX ORDER — PREGABALIN 75 MG/1
150 CAPSULE ORAL ONCE
Status: COMPLETED | OUTPATIENT
Start: 2021-01-18 | End: 2021-01-18

## 2021-01-18 RX ORDER — OXYCODONE HYDROCHLORIDE 5 MG/1
5 TABLET ORAL AS NEEDED
Status: DISCONTINUED | OUTPATIENT
Start: 2021-01-18 | End: 2021-01-18 | Stop reason: HOSPADM

## 2021-01-18 RX ORDER — MELATONIN
1000 DAILY
Status: DISCONTINUED | OUTPATIENT
Start: 2021-01-19 | End: 2021-01-21 | Stop reason: HOSPADM

## 2021-01-18 RX ORDER — METFORMIN HYDROCHLORIDE 500 MG/1
1000 TABLET ORAL 2 TIMES DAILY WITH MEALS
Status: DISCONTINUED | OUTPATIENT
Start: 2021-01-18 | End: 2021-01-21 | Stop reason: HOSPADM

## 2021-01-18 RX ORDER — SODIUM CHLORIDE, SODIUM LACTATE, POTASSIUM CHLORIDE, CALCIUM CHLORIDE 600; 310; 30; 20 MG/100ML; MG/100ML; MG/100ML; MG/100ML
25 INJECTION, SOLUTION INTRAVENOUS CONTINUOUS
Status: DISCONTINUED | OUTPATIENT
Start: 2021-01-18 | End: 2021-01-18 | Stop reason: HOSPADM

## 2021-01-18 RX ORDER — MIDAZOLAM HYDROCHLORIDE 1 MG/ML
0.5 INJECTION, SOLUTION INTRAMUSCULAR; INTRAVENOUS
Status: DISCONTINUED | OUTPATIENT
Start: 2021-01-18 | End: 2021-01-18 | Stop reason: HOSPADM

## 2021-01-18 RX ORDER — FENTANYL CITRATE 50 UG/ML
INJECTION, SOLUTION INTRAMUSCULAR; INTRAVENOUS
Status: COMPLETED
Start: 2021-01-18 | End: 2021-01-18

## 2021-01-18 RX ORDER — LIDOCAINE HYDROCHLORIDE 10 MG/ML
0.1 INJECTION, SOLUTION EPIDURAL; INFILTRATION; INTRACAUDAL; PERINEURAL AS NEEDED
Status: DISCONTINUED | OUTPATIENT
Start: 2021-01-18 | End: 2021-01-18 | Stop reason: HOSPADM

## 2021-01-18 RX ORDER — SODIUM CHLORIDE 0.9 % (FLUSH) 0.9 %
5-40 SYRINGE (ML) INJECTION AS NEEDED
Status: DISCONTINUED | OUTPATIENT
Start: 2021-01-18 | End: 2021-01-21 | Stop reason: HOSPADM

## 2021-01-18 RX ORDER — LANOLIN ALCOHOL/MO/W.PET/CERES
5 CREAM (GRAM) TOPICAL
Status: DISCONTINUED | OUTPATIENT
Start: 2021-01-18 | End: 2021-01-21 | Stop reason: HOSPADM

## 2021-01-18 RX ORDER — MAGNESIUM SULFATE HEPTAHYDRATE 40 MG/ML
INJECTION, SOLUTION INTRAVENOUS AS NEEDED
Status: DISCONTINUED | OUTPATIENT
Start: 2021-01-18 | End: 2021-01-18 | Stop reason: HOSPADM

## 2021-01-18 RX ORDER — SODIUM CHLORIDE 0.9 % (FLUSH) 0.9 %
5-40 SYRINGE (ML) INJECTION AS NEEDED
Status: DISCONTINUED | OUTPATIENT
Start: 2021-01-18 | End: 2021-01-18 | Stop reason: HOSPADM

## 2021-01-18 RX ORDER — TEMAZEPAM 30 MG/1
30 CAPSULE ORAL
Status: DISCONTINUED | OUTPATIENT
Start: 2021-01-18 | End: 2021-01-21 | Stop reason: HOSPADM

## 2021-01-18 RX ORDER — FAMOTIDINE 20 MG/1
20 TABLET, FILM COATED ORAL 2 TIMES DAILY
Status: DISCONTINUED | OUTPATIENT
Start: 2021-01-18 | End: 2021-01-21 | Stop reason: HOSPADM

## 2021-01-18 RX ORDER — MORPHINE SULFATE 10 MG/ML
2 INJECTION, SOLUTION INTRAMUSCULAR; INTRAVENOUS
Status: DISCONTINUED | OUTPATIENT
Start: 2021-01-18 | End: 2021-01-18 | Stop reason: HOSPADM

## 2021-01-18 RX ORDER — SODIUM CHLORIDE 9 MG/ML
25 INJECTION, SOLUTION INTRAVENOUS CONTINUOUS
Status: DISCONTINUED | OUTPATIENT
Start: 2021-01-18 | End: 2021-01-18 | Stop reason: HOSPADM

## 2021-01-18 RX ORDER — HYDROMORPHONE HYDROCHLORIDE 2 MG/ML
INJECTION, SOLUTION INTRAMUSCULAR; INTRAVENOUS; SUBCUTANEOUS AS NEEDED
Status: DISCONTINUED | OUTPATIENT
Start: 2021-01-18 | End: 2021-01-18 | Stop reason: HOSPADM

## 2021-01-18 RX ORDER — LAMOTRIGINE 100 MG/1
150 TABLET ORAL 2 TIMES DAILY
Status: DISCONTINUED | OUTPATIENT
Start: 2021-01-18 | End: 2021-01-21 | Stop reason: HOSPADM

## 2021-01-18 RX ORDER — FACIAL-BODY WIPES
10 EACH TOPICAL DAILY PRN
Status: DISCONTINUED | OUTPATIENT
Start: 2021-01-20 | End: 2021-01-21 | Stop reason: HOSPADM

## 2021-01-18 RX ORDER — ROCURONIUM BROMIDE 10 MG/ML
INJECTION, SOLUTION INTRAVENOUS AS NEEDED
Status: DISCONTINUED | OUTPATIENT
Start: 2021-01-18 | End: 2021-01-18 | Stop reason: HOSPADM

## 2021-01-18 RX ORDER — ONDANSETRON 2 MG/ML
4 INJECTION INTRAMUSCULAR; INTRAVENOUS
Status: DISCONTINUED | OUTPATIENT
Start: 2021-01-18 | End: 2021-01-19

## 2021-01-18 RX ORDER — ACETAMINOPHEN 500 MG
1000 TABLET ORAL ONCE
Status: COMPLETED | OUTPATIENT
Start: 2021-01-18 | End: 2021-01-18

## 2021-01-18 RX ORDER — SODIUM CHLORIDE 0.9 % (FLUSH) 0.9 %
5-40 SYRINGE (ML) INJECTION EVERY 8 HOURS
Status: DISCONTINUED | OUTPATIENT
Start: 2021-01-18 | End: 2021-01-21 | Stop reason: HOSPADM

## 2021-01-18 RX ORDER — TRAZODONE HYDROCHLORIDE 100 MG/1
200 TABLET ORAL
Status: DISCONTINUED | OUTPATIENT
Start: 2021-01-18 | End: 2021-01-21 | Stop reason: HOSPADM

## 2021-01-18 RX ORDER — SODIUM CHLORIDE 9 MG/ML
125 INJECTION, SOLUTION INTRAVENOUS CONTINUOUS
Status: DISPENSED | OUTPATIENT
Start: 2021-01-18 | End: 2021-01-19

## 2021-01-18 RX ORDER — HYDROMORPHONE HYDROCHLORIDE 1 MG/ML
0.2 INJECTION, SOLUTION INTRAMUSCULAR; INTRAVENOUS; SUBCUTANEOUS
Status: DISCONTINUED | OUTPATIENT
Start: 2021-01-18 | End: 2021-01-18 | Stop reason: HOSPADM

## 2021-01-18 RX ORDER — FENTANYL CITRATE 50 UG/ML
25 INJECTION, SOLUTION INTRAMUSCULAR; INTRAVENOUS
Status: COMPLETED | OUTPATIENT
Start: 2021-01-18 | End: 2021-01-18

## 2021-01-18 RX ORDER — SUCCINYLCHOLINE CHLORIDE 20 MG/ML
INJECTION INTRAMUSCULAR; INTRAVENOUS AS NEEDED
Status: DISCONTINUED | OUTPATIENT
Start: 2021-01-18 | End: 2021-01-18 | Stop reason: HOSPADM

## 2021-01-18 RX ORDER — AMOXICILLIN 250 MG
1 CAPSULE ORAL 2 TIMES DAILY
Status: DISCONTINUED | OUTPATIENT
Start: 2021-01-18 | End: 2021-01-21 | Stop reason: HOSPADM

## 2021-01-18 RX ORDER — PROPRANOLOL HYDROCHLORIDE 10 MG/1
20 TABLET ORAL 3 TIMES DAILY
Status: DISCONTINUED | OUTPATIENT
Start: 2021-01-18 | End: 2021-01-21 | Stop reason: HOSPADM

## 2021-01-18 RX ORDER — GLYCOPYRROLATE 0.2 MG/ML
INJECTION INTRAMUSCULAR; INTRAVENOUS AS NEEDED
Status: DISCONTINUED | OUTPATIENT
Start: 2021-01-18 | End: 2021-01-18 | Stop reason: HOSPADM

## 2021-01-18 RX ORDER — DIPHENHYDRAMINE HYDROCHLORIDE 50 MG/ML
12.5 INJECTION, SOLUTION INTRAMUSCULAR; INTRAVENOUS AS NEEDED
Status: DISCONTINUED | OUTPATIENT
Start: 2021-01-18 | End: 2021-01-18 | Stop reason: HOSPADM

## 2021-01-18 RX ORDER — HYDROXYZINE HYDROCHLORIDE 10 MG/1
10 TABLET, FILM COATED ORAL
Status: DISCONTINUED | OUTPATIENT
Start: 2021-01-18 | End: 2021-01-21 | Stop reason: HOSPADM

## 2021-01-18 RX ORDER — MIDAZOLAM HYDROCHLORIDE 1 MG/ML
1 INJECTION, SOLUTION INTRAMUSCULAR; INTRAVENOUS AS NEEDED
Status: DISCONTINUED | OUTPATIENT
Start: 2021-01-18 | End: 2021-01-18 | Stop reason: HOSPADM

## 2021-01-18 RX ORDER — INSULIN LISPRO 100 [IU]/ML
INJECTION, SOLUTION INTRAVENOUS; SUBCUTANEOUS
Status: DISCONTINUED | OUTPATIENT
Start: 2021-01-19 | End: 2021-01-18

## 2021-01-18 RX ORDER — LIDOCAINE HYDROCHLORIDE 20 MG/ML
INJECTION, SOLUTION EPIDURAL; INFILTRATION; INTRACAUDAL; PERINEURAL AS NEEDED
Status: DISCONTINUED | OUTPATIENT
Start: 2021-01-18 | End: 2021-01-18 | Stop reason: HOSPADM

## 2021-01-18 RX ORDER — GABAPENTIN 100 MG/1
100 CAPSULE ORAL 3 TIMES DAILY
Status: DISCONTINUED | OUTPATIENT
Start: 2021-01-18 | End: 2021-01-19

## 2021-01-18 RX ORDER — OXYCODONE HYDROCHLORIDE 5 MG/1
5 TABLET ORAL
Status: DISCONTINUED | OUTPATIENT
Start: 2021-01-18 | End: 2021-01-19

## 2021-01-18 RX ORDER — NALOXONE HYDROCHLORIDE 0.4 MG/ML
0.4 INJECTION, SOLUTION INTRAMUSCULAR; INTRAVENOUS; SUBCUTANEOUS AS NEEDED
Status: DISCONTINUED | OUTPATIENT
Start: 2021-01-18 | End: 2021-01-19

## 2021-01-18 RX ORDER — DEXMEDETOMIDINE HYDROCHLORIDE 100 UG/ML
INJECTION, SOLUTION INTRAVENOUS AS NEEDED
Status: DISCONTINUED | OUTPATIENT
Start: 2021-01-18 | End: 2021-01-18 | Stop reason: HOSPADM

## 2021-01-18 RX ORDER — MIDAZOLAM HYDROCHLORIDE 1 MG/ML
INJECTION, SOLUTION INTRAMUSCULAR; INTRAVENOUS AS NEEDED
Status: DISCONTINUED | OUTPATIENT
Start: 2021-01-18 | End: 2021-01-18 | Stop reason: HOSPADM

## 2021-01-18 RX ORDER — LISINOPRIL 5 MG/1
10 TABLET ORAL DAILY
Status: DISCONTINUED | OUTPATIENT
Start: 2021-01-19 | End: 2021-01-21 | Stop reason: HOSPADM

## 2021-01-18 RX ORDER — ACETAMINOPHEN 500 MG
1000 TABLET ORAL EVERY 6 HOURS
Status: DISCONTINUED | OUTPATIENT
Start: 2021-01-18 | End: 2021-01-19

## 2021-01-18 RX ORDER — GABAPENTIN 300 MG/1
600 CAPSULE ORAL 3 TIMES DAILY
Status: DISCONTINUED | OUTPATIENT
Start: 2021-01-18 | End: 2021-01-21 | Stop reason: HOSPADM

## 2021-01-18 RX ORDER — ONDANSETRON 2 MG/ML
INJECTION INTRAMUSCULAR; INTRAVENOUS AS NEEDED
Status: DISCONTINUED | OUTPATIENT
Start: 2021-01-18 | End: 2021-01-18 | Stop reason: HOSPADM

## 2021-01-18 RX ORDER — THERA TABS 400 MCG
1 TAB ORAL DAILY
Status: DISCONTINUED | OUTPATIENT
Start: 2021-01-19 | End: 2021-01-21 | Stop reason: HOSPADM

## 2021-01-18 RX ORDER — LITHIUM CARBONATE 300 MG/1
600 TABLET, FILM COATED, EXTENDED RELEASE ORAL
Status: DISCONTINUED | OUTPATIENT
Start: 2021-01-18 | End: 2021-01-21 | Stop reason: HOSPADM

## 2021-01-18 RX ORDER — PROPOFOL 10 MG/ML
INJECTION, EMULSION INTRAVENOUS AS NEEDED
Status: DISCONTINUED | OUTPATIENT
Start: 2021-01-18 | End: 2021-01-18 | Stop reason: HOSPADM

## 2021-01-18 RX ORDER — DIAZEPAM 5 MG/1
5 TABLET ORAL
Status: DISCONTINUED | OUTPATIENT
Start: 2021-01-18 | End: 2021-01-21 | Stop reason: HOSPADM

## 2021-01-18 RX ORDER — OXYCODONE HYDROCHLORIDE 5 MG/1
10-15 TABLET ORAL
Status: DISCONTINUED | OUTPATIENT
Start: 2021-01-18 | End: 2021-01-21 | Stop reason: HOSPADM

## 2021-01-18 RX ORDER — HYDROMORPHONE HYDROCHLORIDE 1 MG/ML
1 INJECTION, SOLUTION INTRAMUSCULAR; INTRAVENOUS; SUBCUTANEOUS
Status: DISCONTINUED | OUTPATIENT
Start: 2021-01-18 | End: 2021-01-19

## 2021-01-18 RX ORDER — KETAMINE HYDROCHLORIDE 100 MG/ML
INJECTION, SOLUTION INTRAMUSCULAR; INTRAVENOUS AS NEEDED
Status: DISCONTINUED | OUTPATIENT
Start: 2021-01-18 | End: 2021-01-18 | Stop reason: HOSPADM

## 2021-01-18 RX ORDER — DEXAMETHASONE SODIUM PHOSPHATE 4 MG/ML
INJECTION, SOLUTION INTRA-ARTICULAR; INTRALESIONAL; INTRAMUSCULAR; INTRAVENOUS; SOFT TISSUE AS NEEDED
Status: DISCONTINUED | OUTPATIENT
Start: 2021-01-18 | End: 2021-01-18 | Stop reason: HOSPADM

## 2021-01-18 RX ORDER — INSULIN LISPRO 100 [IU]/ML
INJECTION, SOLUTION INTRAVENOUS; SUBCUTANEOUS
Status: DISCONTINUED | OUTPATIENT
Start: 2021-01-18 | End: 2021-01-21 | Stop reason: HOSPADM

## 2021-01-18 RX ORDER — ATORVASTATIN CALCIUM 40 MG/1
40 TABLET, FILM COATED ORAL DAILY
Status: DISCONTINUED | OUTPATIENT
Start: 2021-01-19 | End: 2021-01-21 | Stop reason: HOSPADM

## 2021-01-18 RX ORDER — SODIUM CHLORIDE, SODIUM LACTATE, POTASSIUM CHLORIDE, CALCIUM CHLORIDE 600; 310; 30; 20 MG/100ML; MG/100ML; MG/100ML; MG/100ML
125 INJECTION, SOLUTION INTRAVENOUS CONTINUOUS
Status: DISCONTINUED | OUTPATIENT
Start: 2021-01-18 | End: 2021-01-18 | Stop reason: HOSPADM

## 2021-01-18 RX ORDER — POLYETHYLENE GLYCOL 3350 17 G/17G
17 POWDER, FOR SOLUTION ORAL DAILY
Status: DISCONTINUED | OUTPATIENT
Start: 2021-01-19 | End: 2021-01-21 | Stop reason: HOSPADM

## 2021-01-18 RX ORDER — FENTANYL CITRATE 50 UG/ML
50 INJECTION, SOLUTION INTRAMUSCULAR; INTRAVENOUS AS NEEDED
Status: DISCONTINUED | OUTPATIENT
Start: 2021-01-18 | End: 2021-01-18 | Stop reason: HOSPADM

## 2021-01-18 RX ORDER — EPHEDRINE SULFATE/0.9% NACL/PF 50 MG/5 ML
SYRINGE (ML) INTRAVENOUS AS NEEDED
Status: DISCONTINUED | OUTPATIENT
Start: 2021-01-18 | End: 2021-01-18 | Stop reason: HOSPADM

## 2021-01-18 RX ADMIN — HYDROMORPHONE HYDROCHLORIDE 1 MG: 2 INJECTION, SOLUTION INTRAMUSCULAR; INTRAVENOUS; SUBCUTANEOUS at 14:29

## 2021-01-18 RX ADMIN — PROPRANOLOL HYDROCHLORIDE 20 MG: 10 TABLET ORAL at 22:31

## 2021-01-18 RX ADMIN — DEXAMETHASONE SODIUM PHOSPHATE 8 MG: 4 INJECTION, SOLUTION INTRAMUSCULAR; INTRAVENOUS at 15:21

## 2021-01-18 RX ADMIN — SODIUM CHLORIDE, POTASSIUM CHLORIDE, SODIUM LACTATE AND CALCIUM CHLORIDE: 600; 310; 30; 20 INJECTION, SOLUTION INTRAVENOUS at 15:17

## 2021-01-18 RX ADMIN — SUCCINYLCHOLINE CHLORIDE 140 MG: 20 INJECTION, SOLUTION INTRAMUSCULAR; INTRAVENOUS at 14:37

## 2021-01-18 RX ADMIN — KETAMINE HYDROCHLORIDE 50 ML: 100 INJECTION, SOLUTION, CONCENTRATE INTRAMUSCULAR; INTRAVENOUS at 15:24

## 2021-01-18 RX ADMIN — FAMOTIDINE 20 MG: 20 TABLET, FILM COATED ORAL at 19:00

## 2021-01-18 RX ADMIN — CLONAZEPAM 0.5 MG: 0.5 TABLET ORAL at 19:01

## 2021-01-18 RX ADMIN — LITHIUM CARBONATE 600 MG: 300 TABLET, FILM COATED, EXTENDED RELEASE ORAL at 22:37

## 2021-01-18 RX ADMIN — HYDROMORPHONE HYDROCHLORIDE 0.2 MG: 1 INJECTION, SOLUTION INTRAMUSCULAR; INTRAVENOUS; SUBCUTANEOUS at 17:07

## 2021-01-18 RX ADMIN — TRAZODONE HYDROCHLORIDE 200 MG: 100 TABLET ORAL at 22:32

## 2021-01-18 RX ADMIN — MEPERIDINE HYDROCHLORIDE 12.5 MG: 25 INJECTION, SOLUTION INTRAMUSCULAR; INTRAVENOUS; SUBCUTANEOUS at 17:19

## 2021-01-18 RX ADMIN — DEXMEDETOMIDINE HYDROCHLORIDE 10 MCG: 100 INJECTION, SOLUTION, CONCENTRATE INTRAVENOUS at 15:46

## 2021-01-18 RX ADMIN — CEFAZOLIN SODIUM 2 G: 1 INJECTION, POWDER, FOR SOLUTION INTRAMUSCULAR; INTRAVENOUS at 22:33

## 2021-01-18 RX ADMIN — LIDOCAINE HYDROCHLORIDE 100 MG: 20 INJECTION, SOLUTION INTRAVENOUS at 14:37

## 2021-01-18 RX ADMIN — FENTANYL CITRATE 25 MCG: 50 INJECTION, SOLUTION INTRAMUSCULAR; INTRAVENOUS at 16:42

## 2021-01-18 RX ADMIN — HYDROMORPHONE HYDROCHLORIDE 1 MG: 2 INJECTION, SOLUTION INTRAMUSCULAR; INTRAVENOUS; SUBCUTANEOUS at 15:35

## 2021-01-18 RX ADMIN — FENTANYL CITRATE 25 MCG: 50 INJECTION, SOLUTION INTRAMUSCULAR; INTRAVENOUS at 16:52

## 2021-01-18 RX ADMIN — SODIUM CHLORIDE, POTASSIUM CHLORIDE, SODIUM LACTATE AND CALCIUM CHLORIDE 25 ML/HR: 600; 310; 30; 20 INJECTION, SOLUTION INTRAVENOUS at 12:42

## 2021-01-18 RX ADMIN — HYDROMORPHONE HYDROCHLORIDE 0.2 MG: 1 INJECTION, SOLUTION INTRAMUSCULAR; INTRAVENOUS; SUBCUTANEOUS at 17:59

## 2021-01-18 RX ADMIN — MEPERIDINE HYDROCHLORIDE 12.5 MG: 25 INJECTION, SOLUTION INTRAMUSCULAR; INTRAVENOUS; SUBCUTANEOUS at 17:34

## 2021-01-18 RX ADMIN — TEMAZEPAM 30 MG: 30 CAPSULE ORAL at 22:32

## 2021-01-18 RX ADMIN — GABAPENTIN 100 MG: 100 CAPSULE ORAL at 22:36

## 2021-01-18 RX ADMIN — MAGNESIUM SULFATE IN WATER 2 G: 40 INJECTION, SOLUTION INTRAVENOUS at 15:24

## 2021-01-18 RX ADMIN — DOCUSATE SODIUM - SENNOSIDES 1 TABLET: 50; 8.6 TABLET, FILM COATED ORAL at 19:00

## 2021-01-18 RX ADMIN — Medication 10 MG: at 15:05

## 2021-01-18 RX ADMIN — LAMOTRIGINE 150 MG: 100 TABLET ORAL at 19:01

## 2021-01-18 RX ADMIN — SODIUM CHLORIDE 125 ML/HR: 9 INJECTION, SOLUTION INTRAVENOUS at 18:59

## 2021-01-18 RX ADMIN — FENTANYL CITRATE 25 MCG: 50 INJECTION, SOLUTION INTRAMUSCULAR; INTRAVENOUS at 16:47

## 2021-01-18 RX ADMIN — OXYCODONE 15 MG: 5 TABLET ORAL at 19:01

## 2021-01-18 RX ADMIN — Medication 1 AMPULE: at 21:05

## 2021-01-18 RX ADMIN — Medication 10 ML: at 22:30

## 2021-01-18 RX ADMIN — GABAPENTIN 600 MG: 300 CAPSULE ORAL at 22:36

## 2021-01-18 RX ADMIN — PROPOFOL 200 MG: 10 INJECTION, EMULSION INTRAVENOUS at 14:37

## 2021-01-18 RX ADMIN — Medication 3 AMPULE: at 12:40

## 2021-01-18 RX ADMIN — ONDANSETRON HYDROCHLORIDE 4 MG: 2 INJECTION, SOLUTION INTRAMUSCULAR; INTRAVENOUS at 16:03

## 2021-01-18 RX ADMIN — DEXMEDETOMIDINE HYDROCHLORIDE 10 MCG: 100 INJECTION, SOLUTION, CONCENTRATE INTRAVENOUS at 16:01

## 2021-01-18 RX ADMIN — ACETAMINOPHEN 1000 MG: 500 TABLET ORAL at 19:00

## 2021-01-18 RX ADMIN — FENTANYL CITRATE 25 MCG: 50 INJECTION, SOLUTION INTRAMUSCULAR; INTRAVENOUS at 16:57

## 2021-01-18 RX ADMIN — MIDAZOLAM HYDROCHLORIDE 2 MG: 1 INJECTION, SOLUTION INTRAMUSCULAR; INTRAVENOUS at 14:25

## 2021-01-18 RX ADMIN — Medication 1000 MG: at 12:40

## 2021-01-18 RX ADMIN — ROCURONIUM BROMIDE 5 MG: 10 INJECTION INTRAVENOUS at 14:37

## 2021-01-18 RX ADMIN — GLYCOPYRROLATE 0.2 MG: 0.2 INJECTION, SOLUTION INTRAMUSCULAR; INTRAVENOUS at 15:24

## 2021-01-18 RX ADMIN — METFORMIN HYDROCHLORIDE 1000 MG: 500 TABLET ORAL at 19:00

## 2021-01-18 RX ADMIN — WATER 2 G: 1 INJECTION INTRAMUSCULAR; INTRAVENOUS; SUBCUTANEOUS at 15:11

## 2021-01-18 RX ADMIN — Medication 4.5 MG: at 22:31

## 2021-01-18 RX ADMIN — CLONAZEPAM 0.25 MG: 0.5 TABLET ORAL at 22:32

## 2021-01-18 RX ADMIN — PREGABALIN 150 MG: 75 CAPSULE ORAL at 12:40

## 2021-01-18 RX ADMIN — HYDROMORPHONE HYDROCHLORIDE 0.2 MG: 1 INJECTION, SOLUTION INTRAMUSCULAR; INTRAVENOUS; SUBCUTANEOUS at 17:45

## 2021-01-18 RX ADMIN — HYDROMORPHONE HYDROCHLORIDE 0.2 MG: 1 INJECTION, SOLUTION INTRAMUSCULAR; INTRAVENOUS; SUBCUTANEOUS at 17:29

## 2021-01-18 RX ADMIN — OXYCODONE 15 MG: 5 TABLET ORAL at 22:32

## 2021-01-18 NOTE — H&P
Progress notes    Expand AllCollapse All    ASSESSMENT:  (M54.41,  G89.29) Chronic right-sided low back pain with right-sided sciatica  (primary encounter diagnosis)  (M48.062) Spinal stenosis, lumbar region, with neurogenic claudication  (M43.16) Spondylolisthesis, lumbar region  (M54.5) Lumbar pain  (Z98.1) S/P lumbar fusion  (M54.31,  M54.32) Bilateral sciatica  (M54.42,  M54.41,  G89.29) Chronic bilateral low back pain with bilateral sciatica  (M47.26) Osteoarthritis of spine with radiculopathy, lumbar region         Patient Active Problem List   Diagnosis    Chronic bilateral low back pain with left-sided sciatica    Osteoarthritis of spine with radiculopathy, lumbar region    Spondylolisthesis, lumbar region    S/P lumbar fusion    Sciatica of left side    Spinal stenosis, lumbar region, with neurogenic claudication    Lumbar radiculopathy    Low back pain    Sciatica    Bipolar disorder, mixed    Hyponatremia         Impression: L3-L4 spondylolisthesis with central disc herniation and severe stenosis; low back pain with BLE sciatica, R > L. Gait difficulties and BLE weakness      Minimal relief with ESIs  PLAN:  Ms. Zaina Doll experienced less than 50% relief from the R L2-L3 L3-L4 ESIs. She presents with diffuse 4/5 BLE weakness. We discussed that she has taken an excessive amount of steroids which could negatively impact her kidneys and adrenal glands. I prescribed 300 mg gabapentin TID. At this time, Ms. Warner would like to proceed with an operation.  I scheduled an L3-L4 lateral, L3-L5 posterior revision.     I have discussed the procedure in detail with the patient and mentioned complications, including but not limited to: death, permanent disability, heart attack, stroke, lung injury or infection, blindness, ileus, bladder or bowel problems, ureter injury, bleeding, nerve injury (including numbness, pain and weakness), paralysis (which may be permanent), failure to heal, failure to fuse bone together in fusion procedures, failure to relief symptoms, failure to relief pain, increased pain, need for further surgeries, failure or breakage or hardware, malpositioning of hardware, need to fuse or operate on additional levels determined either during or after surgery, destabilization of the spine (which may require fusion or later surgery), infections (which may or may not require additional surgery), dural tears (tears of the sac holding in nerves and spinal fluid), meningitis, voice changes, vocal cord injury, hoarseness, blood clots, pulmonary embolus, Travis syndrome, recurrent disc herniation, diaphragm paralysis, and anesthetic complications. Comorbidities such as obesity, smoking, rheumatoid arthritis, chronic steroid use and diabetes increase these risks. The patient understands and wants to proceed.      The patient has been prescribed a LSO spinal orthosis pre-operatively for pain relief. The orthosis is medically necessary to reduce pain by restricting mobility of the trunk and to otherwise support weak spinal muscles and/or deformed spine. The patient will meet with our bracing coordinator to be fit for the brace. Follow up after surgery.         Treatment Plan:       Orders Placed This Encounter    Surgical Posting Sheet    Surgical Posting Sheet    BMI >=25 PATIENT INSTRUCTIONS & EDUCATION    BP Elevated Patient Education & Instructions    gabapentin (NEURONTIN) 300 MG capsule         Follow-up: Return for Follow up 2-3 weeks after surgery.         HISTORY OF PRESENT ILLNESS:  Rose Mary Larson; [id number]   Age: 52 y.o. Sex: female   Pain score: Pain rating = 10-Worst pain ever out of 10      Chief Complaint: Pain of the Lower Back        History of present illness:  Rose Mary Larson is a 52 y.o. female 2 weeks s/p R L3-L4 L4-L5 ESIs with complaints of low back pain radiating down the bilateral anterior thighs, R > L. She experienced less than 50% relief from the injections.  It is worse with walking any distance and standing. Tamika Vaughn has tried 300 mg gabapentin TID, decadron and hydrocodone. The pain is rated 10 out of 10 on the VAS.  She is diabetic.              Patient Active Problem List     Diagnosis Date Noted    Hyponatremia 02/21/2018    Lumbar radiculopathy 07/14/2017    Low back pain 07/14/2017    Sciatica 07/14/2017    Sciatica of left side 07/12/2017    Spinal stenosis, lumbar region, with neurogenic claudication 07/12/2017    Chronic bilateral low back pain with left-sided sciatica 06/07/2017    Osteoarthritis of spine with radiculopathy, lumbar region 06/07/2017    Spondylolisthesis, lumbar region 06/07/2017    S/P lumbar fusion 06/07/2017    Bipolar disorder, mixed 09/27/2011               Family History   Problem Relation Age of Onset    Diabetes Mother      Coronary artery disease Mother      Clotting disorder Mother      Diabetes Father      Coronary artery disease Father      Clotting disorder Father      Diabetes Brother      Coronary artery disease Brother      Clotting disorder Brother      Diabetes Sister      Coronary artery disease Sister      Clotting disorder Sister      No Known Problems Son      No Known Problems Daughter      No Known Problems Other      Anesthesia problems Neg Hx           Social History           Tobacco Use    Smoking status: Never Smoker    Smokeless tobacco: Never Used   Substance Use Topics    Alcohol use: Not Currently         Medical History        Past Medical History:   Diagnosis Date    Anxiety      Depression      Diabetes mellitus      Hyperlipidemia      Hypertension              Surgical History         Past Surgical History:   Procedure Laterality Date    APPENDECTOMY        CHOLECYSTECTOMY        SPINE SURGERY                   Current Outpatient Medications:     aspirin 81 MG EC tablet, Take 81 mg by mouth daily, Disp: , Rfl:     atorvastatin (LIPITOR) 40 MG tablet, Take 40 mg by mouth daily  , Disp: , Rfl: 11    baclofen (LIORESAL) 10 MG tablet, TAKE 1 TABLET(10 MG) BY MOUTH THREE TIMES DAILY AS NEEDED FOR MUSCLE SPASMS (Patient taking differently: Take 10 mg by mouth 3 (three) times a day  ), Disp: 90 tablet, Rfl: 0    clonazePAM (KlonoPIN) 0.5 MG tablet, , Disp: , Rfl:     diclofenac (VOLTAREN) 75 MG EC tablet, Take 1 tablet (75 mg total) by mouth 2 (two) times a day, Disp: 60 tablet, Rfl: 0    ergocalciferol (VITAMIN D) 1.25 MG (53840 UT) capsule, Take 50,000 Units by mouth once a week  , Disp: , Rfl:     gabapentin (NEURONTIN) 300 MG capsule, Take 1 capsule (300 mg total) by mouth 3 (three) times a day, Disp: 90 capsule, Rfl: 5    HumuLIN 70/30 KwikPen (70-30) 100 UNIT/ML suspension pen-injector, ADMINISTER 15 UNITS UNDER THE SKIN BEFORE BREAKFAST AND BEFORE DINNER, Disp: , Rfl:     lamoTRIgine (LaMICtal) 150 MG tablet, Take 150 mg by mouth 2 (two) times a day  , Disp: , Rfl:     lisinopril (PRINIVIL,ZESTRIL) 10 MG tablet, Take 10 mg by mouth daily  , Disp: , Rfl:     lithium (LITHOBID) 300 MG CR tablet, TK 2 TS PO QHS, Disp: , Rfl:     Melatonin 5 MG tablet, Take 1 tablet by mouth daily, Disp: , Rfl:     metFORMIN (GLUCOPHAGE) 1000 MG tablet, Take 1,000 mg by mouth 2 (two) times a day with meals  , Disp: , Rfl: 11    methylPREDNISolone 4 MG tablet therapy pack, Take as directed on package instructions. , Disp: 1 each, Rfl: 0    Multiple Vitamins-Minerals (MULTIVITAMIN ADULT PO), Take 1 tablet by mouth daily, Disp: , Rfl:     omega-3 (FISH OIL) 1200 MG capsule, Take 1,200 mg by mouth daily, Disp: , Rfl:     propranolol (INDERAL) 20 MG tablet, Take 20 mg by mouth 3 (three) times a day  , Disp: , Rfl:     traZODone (DESYREL) 100 MG tablet, Take 100 mg by mouth nightly  , Disp: , Rfl: 5    gabapentin (NEURONTIN) 300 MG capsule, Take 2 capsules (600 mg total) by mouth 3 (three) times a day, Disp: 180 capsule, Rfl: 5          Allergies   Allergen Reactions    Darvon [Propoxyphene] Rash    Wellbutrin [Bupropion] Rash         ROS:   No new bowel or bladder incontinence. No fever. No saddle anesthesia.     OBJECTIVE:      Vitals:     12/30/20 1114   BP: 121/75         Body mass index is 46.06 kg/m². , a BMI over 30 is considered obese and a BMI over 40 has been associated with a higher risk of surgical complications.     Constitutional: No acute distress. Well nourished. Respiratory:  No labored breathing. Cardiovascular:  No marked cyanosis. Skin:  No marked skin ulcers/lesions on bilateral upper or lower extremities. Psychiatric: Alert and oriented x3. Inspection: No gross deformity of bilateral upper or lower extremities. Musculoskeletal/Neurological:   Gait/balance:  - Unsteady  Thoracolumbar spine:  - No tenderness to palpation  - Full range of motion. Right lower extremity:  - No tenderness to palpation   - Full range of motion  - No pain with internal/external rotation of the hip  - Strength:  - 4 out of 5 to hip flexors  - 4 out of 5 to quads  - 4 out of 5 to TA  - 4 out of 5 to EHL  - 4 out of 5 to Gastroc/Soleus  - Negative straight leg raise  Left lower extremity:  - No tenderness to palpation   - Full range of motion  - No pain with internal/external rotation of the hip  - Strength:  - 4 out of 5 to hip flexors  - 4 out of 5 to quads  - 4 out of 5 to TA  - 4 out of 5 to EHL  - 4 out of 5 to Gastroc/Soleus  - Negative straight leg raise  Sensation:  - Intact to light touch  Reflexes:  - +2 Patellar tendon   - +2 Achilles tendon            RESULTS REVIEWED:          No imaging obtained             I have instructed the patient to continue to work on their physician supervised home exercise program.      This note has been transcribed electronically using voice recognition and a trained scribe.   It is believed to be accurate, but may contain errors secondary to technological limitations and other factors.     I, Tanmay Payne MD, personally, performed the services described in this documentation, as scribed in my presence, and it is both accurate and complete.   Scribed by: Dara Zuñiga

## 2021-01-18 NOTE — BRIEF OP NOTE
Brief Postoperative Note    Patient: Benji Olmos  YOB: 1970  MRN: [id number]    Date of Procedure: 1/18/2021     Pre-Op Diagnosis: Chronic right-sided low back pain with right-sided sciatica [M54.41, G89.29]  Spinal stenosis, lumbar region, with neurogenic claudication [M48.062]  Spondylolisthesis of lumbar region [M43.16]  Lumbar pain [M54.5]  S/P spinal fusion [Z98.1]  Bilateral sciatica [M54.31, M54.32]  Chronic bilateral low back pain with bilateral sciatica [M54.42, M54.41, G89.29]  Osteoarthritis of spine with radiculopathy, lumbar region [M47.26]    Post-Op Diagnosis: Same as preoperative diagnosis. Procedure(s):  L3-4 LATERAL FUSION     Surgeon(s):  Claudene Perfect, MD    Surgical Assistant: Physician Assistant: GENE Patel    Anesthesia: Other     Estimated Blood Loss (mL): less than 50     Complications: None    Specimens: * No specimens in log *     Implants:   Implant Name Type Inv.  Item Serial No.  Lot No. LRB No. Used Action   GRAFT BNE 5CC -- OSTEOAMP SELECT - [id number] Bone GRAFT BNE 5CC -- OSTEOAMP SELECT [id number] BIOVENTUS [facility]_ NA N/A 1 Implanted       Drains: * No LDAs found *    Findings: Stenosis    Electronically Signed by Jd Reis MD on 1/18/2021 at 4:09 PM

## 2021-01-18 NOTE — ANESTHESIA PREPROCEDURE EVALUATION
Anesthetic History   No history of anesthetic complications            Review of Systems / Medical History  Patient summary reviewed, nursing notes reviewed and pertinent labs reviewed    Pulmonary  Within defined limits                 Neuro/Psych         Psychiatric history     Cardiovascular    Hypertension              Exercise tolerance: >4 METS     GI/Hepatic/Renal  Within defined limits              Endo/Other    Diabetes    Morbid obesity     Other Findings   Comments: PTSD  Bipolar  Dissociative identity disorder            Physical Exam    Airway  Mallampati: IV  TM Distance: 4 - 6 cm  Neck ROM: normal range of motion, short neck   Mouth opening: Diminished (comment)     Cardiovascular  Regular rate and rhythm,  S1 and S2 normal,  no murmur, click, rub, or gallop             Dental  No notable dental hx       Pulmonary  Breath sounds clear to auscultation               Abdominal  GI exam deferred       Other Findings            Anesthetic Plan    ASA: 3  Anesthesia type: general            Anesthetic plan and risks discussed with: Patient

## 2021-01-18 NOTE — ANESTHESIA POSTPROCEDURE EVALUATION
Procedure(s):  L3-4 LATERAL FUSION . general    Anesthesia Post Evaluation        Patient location during evaluation: PACU  Note status: Adequate. Level of consciousness: responsive to verbal stimuli and sleepy but conscious  Pain management: satisfactory to patient  Airway patency: patent  Anesthetic complications: no  Cardiovascular status: acceptable  Respiratory status: acceptable  Hydration status: acceptable  Comments: +Post-Anesthesia Evaluation and Assessment    Patient: Chucho Nielsen MRN: [id number]  SSN: [id number]   YOB: 1970  Age: 52 y.o. Sex: female      Cardiovascular Function/Vital Signs    /75   Pulse 77   Temp 36 °C (96.8 °F)   Resp 19   Wt 112.1 kg (247 lb 2.2 oz)   SpO2 98%   BMI 46.70 kg/m²     Patient is status post Procedure(s):  L3-4 LATERAL FUSION . Nausea/Vomiting: Controlled. Postoperative hydration reviewed and adequate. Pain:  Pain Scale 1: Numeric (0 - 10) (01/18/21 1652)  Pain Intensity 1: 7 (01/18/21 1652)   Managed. Neurological Status:   Neuro (WDL): Exceptions to WDL (01/18/21 1628)   At baseline. Mental Status and Level of Consciousness: Arousable. Pulmonary Status:   O2 Device: Nasal cannula (01/18/21 1640)   Adequate oxygenation and airway patent. Complications related to anesthesia: None    Post-anesthesia assessment completed. No concerns. Signed By: Carlos Gordillo MD    1/18/2021  Post anesthesia nausea and vomiting:  controlled      INITIAL Post-op Vital signs:   Vitals Value Taken Time   /75 01/18/21 1645   Temp 36 °C (96.8 °F) 01/18/21 1628   Pulse 83 01/18/21 1652   Resp 18 01/18/21 1652   SpO2 99 % 01/18/21 1652   Vitals shown include unvalidated device data.

## 2021-01-18 NOTE — DISCHARGE INSTRUCTIONS
[address]    Discharge Instruction Sheet: POSTERIOR SPINAL FUSION    DR. Brijesh Borrego    Pain control:   Typically, we will prescribe a narcotic usually 1-2 tabs every four hours is    sufficient for the pain. Most patients need this only for the first few weeks. You   should discontinue this as the pain decreases. You should not drive while taking any narcotic pain medications. Constipation  Pain medicines and anesthesia can be constipating-this can be prevented by gentle physical activity and drinking plenty of fluid. It should be treated with over-the-counter medications such as Miralax or suppositories, and/or Fleets enema. You should have a bowel movement at least every other day following surgery. Incision care     Keep this area clean and dry. Your dressing is designed to stay in place for 5-7 days. You will be sent home with one additional dressing to change at that time. Leave this new dressing in place until our follow up visit in the office in about 10-14 days. If staples are in place, they should be removed about 14-20 days after surgery. You may shower with this impermeable dressing in place. DO NOT take a tub bath or go swimming until cleared by your doctor. DO NOT apply lotions, oils, or creams to incision. To increase and promote healing:   Stop Smoking (or at least cut back on smoking).  Eat a well-balanced diet (high in protein and vitamin C)   If your appetite is poor, consider nutritional supplements like Ensure, Glucerna, or [place] Instant Breakfast.   If you are diabetic, controlling you blood sugars is very important to prevent infection and promote wound healing. Nutrition:   If you were on a supplement such as Ensure or Glucerna) while in the hospital, please continue using them with each meal for the next 30 days.    Eat a well-balanced diet - High in protein, high in vitamins and minerals, especially vitamin C and zinc.     Restrictions:   Remember your \"BLT's\"    1. Limited bending at waist    2. Lift no more than 10 pounds    3. No Twisting     If you were given a brace, wear it when out of bed. Warning signs : Please call your physician immediately at [phone] if you have   Bleeding from incision that is constant.  Change in mental status (unusual behavior or confusion)   If your incision develops redness or swelling   Change in wound drainage (increase in amount, color, or foul odor)   [place] over 101.5 degrees Fahrenheit    Headache that is not relieved with pain medication   Tenderness or redness in the calf of your leg    Emergency: CALL 911 if you have   Shortness of breath   Chest pain   Localized chest pain when coughing or taking a deep breath    Follow-up  Please call Dr. Emelina Hannah office for a follow up appointment in 2-3 weeks at [phone]. You can return to work when cleared by a physician. During normal business hours you may reach Dr. Jocelyne Patton' team directly at [phone] if you have concerns or questions.     [address] [facility] [address]

## 2021-01-18 NOTE — PERIOP NOTES
Floseal Hemostatic Matrix: 10mL   Reference number: [id number]   Lot Number: [id number]   Expiration Date: 11/14/2022

## 2021-01-18 NOTE — PROGRESS NOTES
TRANSFER - IN REPORT:    Verbal report received from Vianney(name) on [address]  being received from [facility]) for routine post - op      Report consisted of patients Situation, Background, Assessment and   Recommendations(SBAR). Information from the following report(s) SBAR, Kardex, Intake/Output, MAR and Recent Results was reviewed with the receiving nurse. Opportunity for questions and clarification was provided. Assessment completed upon patients arrival to unit and care assumed.

## 2021-01-19 ENCOUNTER — APPOINTMENT (OUTPATIENT)
Dept: GENERAL RADIOLOGY | Age: 51
DRG: 454 | End: 2021-01-19
Attending: ORTHOPAEDIC SURGERY
Payer: MEDICARE

## 2021-01-19 ENCOUNTER — ANESTHESIA EVENT (OUTPATIENT)
Dept: SURGERY | Age: 51
DRG: 454 | End: 2021-01-19
Payer: MEDICARE

## 2021-01-19 ENCOUNTER — ANESTHESIA (OUTPATIENT)
Dept: SURGERY | Age: 51
DRG: 454 | End: 2021-01-19
Payer: MEDICARE

## 2021-01-19 LAB
GLUCOSE BLD STRIP.AUTO-MCNC: 142 MG/DL (ref 65–100)
GLUCOSE BLD STRIP.AUTO-MCNC: 148 MG/DL (ref 65–100)
GLUCOSE BLD STRIP.AUTO-MCNC: 168 MG/DL (ref 65–100)
GLUCOSE BLD STRIP.AUTO-MCNC: 212 MG/DL (ref 65–100)
HCG UR QL: NEGATIVE
HGB BLD-MCNC: 10.5 G/DL (ref 11.5–16)
SERVICE CMNT-IMP: ABNORMAL

## 2021-01-19 PROCEDURE — 77030003028 HC SUT VCRL J&J -A: Performed by: ORTHOPAEDIC SURGERY

## 2021-01-19 PROCEDURE — 74011000250 HC RX REV CODE- 250: Performed by: NURSE ANESTHETIST, CERTIFIED REGISTERED

## 2021-01-19 PROCEDURE — 74011636637 HC RX REV CODE- 636/637: Performed by: ORTHOPAEDIC SURGERY

## 2021-01-19 PROCEDURE — 77030003445 HC NDL BIOP BN BD -B: Performed by: ORTHOPAEDIC SURGERY

## 2021-01-19 PROCEDURE — 97116 GAIT TRAINING THERAPY: CPT

## 2021-01-19 PROCEDURE — 07DR3ZZ EXTRACTION OF ILIAC BONE MARROW, PERCUTANEOUS APPROACH: ICD-10-PCS | Performed by: ORTHOPAEDIC SURGERY

## 2021-01-19 PROCEDURE — 74011250636 HC RX REV CODE- 250/636: Performed by: ORTHOPAEDIC SURGERY

## 2021-01-19 PROCEDURE — C1713 ANCHOR/SCREW BN/BN,TIS/BN: HCPCS | Performed by: ORTHOPAEDIC SURGERY

## 2021-01-19 PROCEDURE — 74011250636 HC RX REV CODE- 250/636: Performed by: ANESTHESIOLOGY

## 2021-01-19 PROCEDURE — 77030018723 HC ELCTRD BLD COVD -A: Performed by: ORTHOPAEDIC SURGERY

## 2021-01-19 PROCEDURE — 77030014007 HC SPNG HEMSTAT J&J -B: Performed by: ORTHOPAEDIC SURGERY

## 2021-01-19 PROCEDURE — 77030040922 HC BLNKT HYPOTHRM STRY -A

## 2021-01-19 PROCEDURE — 77030014647 HC SEAL FBRN TISSL BAXT -D: Performed by: ORTHOPAEDIC SURGERY

## 2021-01-19 PROCEDURE — 77030029099 HC BN WAX SSPC -A: Performed by: ORTHOPAEDIC SURGERY

## 2021-01-19 PROCEDURE — 72100 X-RAY EXAM L-S SPINE 2/3 VWS: CPT

## 2021-01-19 PROCEDURE — 77030026438 HC STYL ET INTUB CARD -A: Performed by: ANESTHESIOLOGY

## 2021-01-19 PROCEDURE — 74011250637 HC RX REV CODE- 250/637: Performed by: ORTHOPAEDIC SURGERY

## 2021-01-19 PROCEDURE — 77030018673: Performed by: ORTHOPAEDIC SURGERY

## 2021-01-19 PROCEDURE — 2709999900 HC NON-CHARGEABLE SUPPLY

## 2021-01-19 PROCEDURE — 01NB0ZZ RELEASE LUMBAR NERVE, OPEN APPROACH: ICD-10-PCS | Performed by: ORTHOPAEDIC SURGERY

## 2021-01-19 PROCEDURE — 76210000006 HC OR PH I REC 0.5 TO 1 HR: Performed by: ORTHOPAEDIC SURGERY

## 2021-01-19 PROCEDURE — 77030018846 HC SOL IRR STRL H20 ICUM -A: Performed by: ORTHOPAEDIC SURGERY

## 2021-01-19 PROCEDURE — 0SG1071 FUSION OF 2 OR MORE LUMBAR VERTEBRAL JOINTS WITH AUTOLOGOUS TISSUE SUBSTITUTE, POSTERIOR APPROACH, POSTERIOR COLUMN, OPEN APPROACH: ICD-10-PCS | Performed by: ORTHOPAEDIC SURGERY

## 2021-01-19 PROCEDURE — 74011250636 HC RX REV CODE- 250/636: Performed by: NURSE ANESTHETIST, CERTIFIED REGISTERED

## 2021-01-19 PROCEDURE — 77030040356 HC CORD BPLR FRCP COVD -A: Performed by: ORTHOPAEDIC SURGERY

## 2021-01-19 PROCEDURE — 77030032806 HC CAP SPN LOK CREO GLBM -B: Performed by: ORTHOPAEDIC SURGERY

## 2021-01-19 PROCEDURE — 74011000250 HC RX REV CODE- 250: Performed by: ORTHOPAEDIC SURGERY

## 2021-01-19 PROCEDURE — 76060000036 HC ANESTHESIA 2.5 TO 3 HR: Performed by: ORTHOPAEDIC SURGERY

## 2021-01-19 PROCEDURE — 65270000029 HC RM PRIVATE

## 2021-01-19 PROCEDURE — 77030041680 HC PNCL ELECSURG SMK EVAC CNMD -B: Performed by: ORTHOPAEDIC SURGERY

## 2021-01-19 PROCEDURE — 36415 COLL VENOUS BLD VENIPUNCTURE: CPT

## 2021-01-19 PROCEDURE — 77030040361 HC SLV COMPR DVT MDII -B: Performed by: ORTHOPAEDIC SURGERY

## 2021-01-19 PROCEDURE — 97162 PT EVAL MOD COMPLEX 30 MIN: CPT

## 2021-01-19 PROCEDURE — 82962 GLUCOSE BLOOD TEST: CPT

## 2021-01-19 PROCEDURE — 76010000172 HC OR TIME 2.5 TO 3 HR INTENSV-TIER 1: Performed by: ORTHOPAEDIC SURGERY

## 2021-01-19 PROCEDURE — 77030004402 HC BUR NEUR STRY -C: Performed by: ORTHOPAEDIC SURGERY

## 2021-01-19 PROCEDURE — 77030008684 HC TU ET CUF COVD -B: Performed by: ANESTHESIOLOGY

## 2021-01-19 PROCEDURE — 77030022704 HC SUT VLOC COVD -B: Performed by: ORTHOPAEDIC SURGERY

## 2021-01-19 PROCEDURE — 77030003666 HC NDL SPINAL BD -A: Performed by: ORTHOPAEDIC SURGERY

## 2021-01-19 PROCEDURE — 8E0W0CZ ROBOTIC ASSISTED PROCEDURE OF TRUNK REGION, OPEN APPROACH: ICD-10-PCS | Performed by: ORTHOPAEDIC SURGERY

## 2021-01-19 PROCEDURE — 77030040179 HC DEV DRSG WND PICO S&N -C: Performed by: ORTHOPAEDIC SURGERY

## 2021-01-19 PROCEDURE — 77030013079 HC BLNKT BAIR HGGR 3M -A: Performed by: ANESTHESIOLOGY

## 2021-01-19 PROCEDURE — 85018 HEMOGLOBIN: CPT

## 2021-01-19 PROCEDURE — 81025 URINE PREGNANCY TEST: CPT

## 2021-01-19 PROCEDURE — 2709999900 HC NON-CHARGEABLE SUPPLY: Performed by: ORTHOPAEDIC SURGERY

## 2021-01-19 PROCEDURE — 0QP004Z REMOVAL OF INTERNAL FIXATION DEVICE FROM LUMBAR VERTEBRA, OPEN APPROACH: ICD-10-PCS | Performed by: ORTHOPAEDIC SURGERY

## 2021-01-19 PROCEDURE — 77030038692 HC WND DEB SYS IRMX -B: Performed by: ORTHOPAEDIC SURGERY

## 2021-01-19 PROCEDURE — 77030040950 HC GRFT BN OSTEOAMP SELCT BTUS -H: Performed by: ORTHOPAEDIC SURGERY

## 2021-01-19 PROCEDURE — 77030008462 HC STPLR SKN PROX J&J -A: Performed by: ORTHOPAEDIC SURGERY

## 2021-01-19 PROCEDURE — 76000 FLUOROSCOPY <1 HR PHYS/QHP: CPT

## 2021-01-19 PROCEDURE — 77030018836 HC SOL IRR NACL ICUM -A: Performed by: ORTHOPAEDIC SURGERY

## 2021-01-19 DEVICE — 6.5 X 45MM CANNULATED SCREW, MODULAR, CREO AMP
Type: IMPLANTABLE DEVICE | Site: SPINE LUMBAR | Status: FUNCTIONAL
Brand: CREO

## 2021-01-19 DEVICE — CREO MIS 5.5MM CURVED ROD, TITANIUM ALLOY, 90MM LENGTH
Type: IMPLANTABLE DEVICE | Site: SPINE LUMBAR | Status: FUNCTIONAL
Brand: CREO

## 2021-01-19 DEVICE — 7.5 X 40MM CANNULATED SCREW, MODULAR, CREO AMP
Type: IMPLANTABLE DEVICE | Site: SPINE LUMBAR | Status: FUNCTIONAL
Brand: CREO

## 2021-01-19 DEVICE — CREO MIS LOCKING CAP
Type: IMPLANTABLE DEVICE | Site: SPINE LUMBAR | Status: FUNCTIONAL
Brand: CREO

## 2021-01-19 DEVICE — CREO MIS MODULAR POLYAXIAL TULIP, 30MM REDUCTION
Type: IMPLANTABLE DEVICE | Site: SPINE LUMBAR | Status: FUNCTIONAL
Brand: CREO

## 2021-01-19 RX ORDER — FAMOTIDINE 20 MG/1
20 TABLET, FILM COATED ORAL 2 TIMES DAILY
Status: DISCONTINUED | OUTPATIENT
Start: 2021-01-19 | End: 2021-01-19

## 2021-01-19 RX ORDER — CEFAZOLIN SODIUM 1 G/3ML
INJECTION, POWDER, FOR SOLUTION INTRAMUSCULAR; INTRAVENOUS AS NEEDED
Status: DISCONTINUED | OUTPATIENT
Start: 2021-01-19 | End: 2021-01-19 | Stop reason: HOSPADM

## 2021-01-19 RX ORDER — KETAMINE HYDROCHLORIDE 10 MG/ML
INJECTION, SOLUTION INTRAMUSCULAR; INTRAVENOUS AS NEEDED
Status: DISCONTINUED | OUTPATIENT
Start: 2021-01-19 | End: 2021-01-19 | Stop reason: HOSPADM

## 2021-01-19 RX ORDER — FENTANYL CITRATE 50 UG/ML
INJECTION, SOLUTION INTRAMUSCULAR; INTRAVENOUS AS NEEDED
Status: DISCONTINUED | OUTPATIENT
Start: 2021-01-19 | End: 2021-01-19 | Stop reason: HOSPADM

## 2021-01-19 RX ORDER — FENTANYL CITRATE 50 UG/ML
50 INJECTION, SOLUTION INTRAMUSCULAR; INTRAVENOUS AS NEEDED
Status: DISCONTINUED | OUTPATIENT
Start: 2021-01-19 | End: 2021-01-19 | Stop reason: HOSPADM

## 2021-01-19 RX ORDER — ACETAMINOPHEN 500 MG
1000 TABLET ORAL ONCE
Status: DISCONTINUED | OUTPATIENT
Start: 2021-01-19 | End: 2021-01-19

## 2021-01-19 RX ORDER — MIDAZOLAM HYDROCHLORIDE 1 MG/ML
1 INJECTION, SOLUTION INTRAMUSCULAR; INTRAVENOUS AS NEEDED
Status: DISCONTINUED | OUTPATIENT
Start: 2021-01-19 | End: 2021-01-19 | Stop reason: HOSPADM

## 2021-01-19 RX ORDER — HYDROXYZINE HYDROCHLORIDE 10 MG/1
10 TABLET, FILM COATED ORAL
Status: DISCONTINUED | OUTPATIENT
Start: 2021-01-19 | End: 2021-01-19

## 2021-01-19 RX ORDER — NALOXONE HYDROCHLORIDE 0.4 MG/ML
0.4 INJECTION, SOLUTION INTRAMUSCULAR; INTRAVENOUS; SUBCUTANEOUS AS NEEDED
Status: DISCONTINUED | OUTPATIENT
Start: 2021-01-19 | End: 2021-01-21 | Stop reason: HOSPADM

## 2021-01-19 RX ORDER — ONDANSETRON 2 MG/ML
INJECTION INTRAMUSCULAR; INTRAVENOUS AS NEEDED
Status: DISCONTINUED | OUTPATIENT
Start: 2021-01-19 | End: 2021-01-19 | Stop reason: HOSPADM

## 2021-01-19 RX ORDER — LIDOCAINE HYDROCHLORIDE 10 MG/ML
0.1 INJECTION, SOLUTION EPIDURAL; INFILTRATION; INTRACAUDAL; PERINEURAL AS NEEDED
Status: DISCONTINUED | OUTPATIENT
Start: 2021-01-19 | End: 2021-01-19 | Stop reason: HOSPADM

## 2021-01-19 RX ORDER — DEXAMETHASONE SODIUM PHOSPHATE 4 MG/ML
INJECTION, SOLUTION INTRA-ARTICULAR; INTRALESIONAL; INTRAMUSCULAR; INTRAVENOUS; SOFT TISSUE AS NEEDED
Status: DISCONTINUED | OUTPATIENT
Start: 2021-01-19 | End: 2021-01-19 | Stop reason: HOSPADM

## 2021-01-19 RX ORDER — SUCCINYLCHOLINE CHLORIDE 20 MG/ML
INJECTION INTRAMUSCULAR; INTRAVENOUS AS NEEDED
Status: DISCONTINUED | OUTPATIENT
Start: 2021-01-19 | End: 2021-01-19 | Stop reason: HOSPADM

## 2021-01-19 RX ORDER — ONDANSETRON 2 MG/ML
4 INJECTION INTRAMUSCULAR; INTRAVENOUS
Status: ACTIVE | OUTPATIENT
Start: 2021-01-19 | End: 2021-01-20

## 2021-01-19 RX ORDER — SODIUM CHLORIDE 9 MG/ML
125 INJECTION, SOLUTION INTRAVENOUS CONTINUOUS
Status: DISPENSED | OUTPATIENT
Start: 2021-01-19 | End: 2021-01-20

## 2021-01-19 RX ORDER — SODIUM CHLORIDE 0.9 % (FLUSH) 0.9 %
5-40 SYRINGE (ML) INJECTION AS NEEDED
Status: DISCONTINUED | OUTPATIENT
Start: 2021-01-19 | End: 2021-01-21 | Stop reason: HOSPADM

## 2021-01-19 RX ORDER — MIDAZOLAM HYDROCHLORIDE 1 MG/ML
INJECTION, SOLUTION INTRAMUSCULAR; INTRAVENOUS AS NEEDED
Status: DISCONTINUED | OUTPATIENT
Start: 2021-01-19 | End: 2021-01-19 | Stop reason: HOSPADM

## 2021-01-19 RX ORDER — ROCURONIUM BROMIDE 10 MG/ML
INJECTION, SOLUTION INTRAVENOUS AS NEEDED
Status: DISCONTINUED | OUTPATIENT
Start: 2021-01-19 | End: 2021-01-19 | Stop reason: HOSPADM

## 2021-01-19 RX ORDER — OXYCODONE HYDROCHLORIDE 5 MG/1
10-15 TABLET ORAL
Status: DISCONTINUED | OUTPATIENT
Start: 2021-01-19 | End: 2021-01-19

## 2021-01-19 RX ORDER — ROPIVACAINE HYDROCHLORIDE 5 MG/ML
INJECTION, SOLUTION EPIDURAL; INFILTRATION; PERINEURAL AS NEEDED
Status: DISCONTINUED | OUTPATIENT
Start: 2021-01-19 | End: 2021-01-19 | Stop reason: HOSPADM

## 2021-01-19 RX ORDER — PREGABALIN 75 MG/1
150 CAPSULE ORAL ONCE
Status: DISCONTINUED | OUTPATIENT
Start: 2021-01-19 | End: 2021-01-19

## 2021-01-19 RX ORDER — SODIUM CHLORIDE, SODIUM LACTATE, POTASSIUM CHLORIDE, CALCIUM CHLORIDE 600; 310; 30; 20 MG/100ML; MG/100ML; MG/100ML; MG/100ML
100 INJECTION, SOLUTION INTRAVENOUS CONTINUOUS
Status: DISCONTINUED | OUTPATIENT
Start: 2021-01-19 | End: 2021-01-19 | Stop reason: HOSPADM

## 2021-01-19 RX ORDER — LIDOCAINE HYDROCHLORIDE 20 MG/ML
INJECTION, SOLUTION EPIDURAL; INFILTRATION; INTRACAUDAL; PERINEURAL AS NEEDED
Status: DISCONTINUED | OUTPATIENT
Start: 2021-01-19 | End: 2021-01-19 | Stop reason: HOSPADM

## 2021-01-19 RX ORDER — SODIUM CHLORIDE 0.9 % (FLUSH) 0.9 %
5-40 SYRINGE (ML) INJECTION EVERY 8 HOURS
Status: DISCONTINUED | OUTPATIENT
Start: 2021-01-19 | End: 2021-01-21 | Stop reason: HOSPADM

## 2021-01-19 RX ORDER — DIAZEPAM 5 MG/1
5 TABLET ORAL
Status: DISCONTINUED | OUTPATIENT
Start: 2021-01-19 | End: 2021-01-19

## 2021-01-19 RX ORDER — HYDROCODONE BITARTRATE AND ACETAMINOPHEN 5; 325 MG/1; MG/1
1 TABLET ORAL AS NEEDED
Status: DISCONTINUED | OUTPATIENT
Start: 2021-01-19 | End: 2021-01-19 | Stop reason: HOSPADM

## 2021-01-19 RX ORDER — FACIAL-BODY WIPES
10 EACH TOPICAL DAILY PRN
Status: DISCONTINUED | OUTPATIENT
Start: 2021-01-21 | End: 2021-01-19

## 2021-01-19 RX ORDER — ONDANSETRON 2 MG/ML
4 INJECTION INTRAMUSCULAR; INTRAVENOUS AS NEEDED
Status: DISCONTINUED | OUTPATIENT
Start: 2021-01-19 | End: 2021-01-19 | Stop reason: HOSPADM

## 2021-01-19 RX ORDER — NEOSTIGMINE METHYLSULFATE 1 MG/ML
INJECTION, SOLUTION INTRAVENOUS AS NEEDED
Status: DISCONTINUED | OUTPATIENT
Start: 2021-01-19 | End: 2021-01-19 | Stop reason: HOSPADM

## 2021-01-19 RX ORDER — PROPOFOL 10 MG/ML
INJECTION, EMULSION INTRAVENOUS AS NEEDED
Status: DISCONTINUED | OUTPATIENT
Start: 2021-01-19 | End: 2021-01-19 | Stop reason: HOSPADM

## 2021-01-19 RX ORDER — GLYCOPYRROLATE 0.2 MG/ML
INJECTION INTRAMUSCULAR; INTRAVENOUS AS NEEDED
Status: DISCONTINUED | OUTPATIENT
Start: 2021-01-19 | End: 2021-01-19 | Stop reason: HOSPADM

## 2021-01-19 RX ORDER — POLYETHYLENE GLYCOL 3350 17 G/17G
17 POWDER, FOR SOLUTION ORAL DAILY
Status: DISCONTINUED | OUTPATIENT
Start: 2021-01-20 | End: 2021-01-19

## 2021-01-19 RX ORDER — AMOXICILLIN 250 MG
1 CAPSULE ORAL 2 TIMES DAILY
Status: DISCONTINUED | OUTPATIENT
Start: 2021-01-19 | End: 2021-01-19

## 2021-01-19 RX ORDER — LIDOCAINE HYDROCHLORIDE AND EPINEPHRINE 10; 10 MG/ML; UG/ML
INJECTION, SOLUTION INFILTRATION; PERINEURAL AS NEEDED
Status: DISCONTINUED | OUTPATIENT
Start: 2021-01-19 | End: 2021-01-19 | Stop reason: HOSPADM

## 2021-01-19 RX ORDER — ACETAMINOPHEN 500 MG
1000 TABLET ORAL EVERY 6 HOURS
Status: DISCONTINUED | OUTPATIENT
Start: 2021-01-19 | End: 2021-01-21 | Stop reason: HOSPADM

## 2021-01-19 RX ORDER — HYDROMORPHONE HYDROCHLORIDE 1 MG/ML
0.5 INJECTION, SOLUTION INTRAMUSCULAR; INTRAVENOUS; SUBCUTANEOUS
Status: DISCONTINUED | OUTPATIENT
Start: 2021-01-19 | End: 2021-01-19 | Stop reason: HOSPADM

## 2021-01-19 RX ORDER — DIPHENHYDRAMINE HYDROCHLORIDE 50 MG/ML
12.5 INJECTION, SOLUTION INTRAMUSCULAR; INTRAVENOUS AS NEEDED
Status: DISCONTINUED | OUTPATIENT
Start: 2021-01-19 | End: 2021-01-19 | Stop reason: HOSPADM

## 2021-01-19 RX ORDER — PHENYLEPHRINE HCL IN 0.9% NACL 0.4MG/10ML
SYRINGE (ML) INTRAVENOUS AS NEEDED
Status: DISCONTINUED | OUTPATIENT
Start: 2021-01-19 | End: 2021-01-19 | Stop reason: HOSPADM

## 2021-01-19 RX ORDER — OXYCODONE HYDROCHLORIDE 5 MG/1
5 TABLET ORAL
Status: DISCONTINUED | OUTPATIENT
Start: 2021-01-19 | End: 2021-01-21 | Stop reason: HOSPADM

## 2021-01-19 RX ORDER — MIDAZOLAM HYDROCHLORIDE 1 MG/ML
0.5 INJECTION, SOLUTION INTRAMUSCULAR; INTRAVENOUS
Status: DISCONTINUED | OUTPATIENT
Start: 2021-01-19 | End: 2021-01-19 | Stop reason: HOSPADM

## 2021-01-19 RX ORDER — GABAPENTIN 100 MG/1
100 CAPSULE ORAL 3 TIMES DAILY
Status: DISCONTINUED | OUTPATIENT
Start: 2021-01-19 | End: 2021-01-19

## 2021-01-19 RX ORDER — HYDROMORPHONE HYDROCHLORIDE 1 MG/ML
1 INJECTION, SOLUTION INTRAMUSCULAR; INTRAVENOUS; SUBCUTANEOUS
Status: DISCONTINUED | OUTPATIENT
Start: 2021-01-19 | End: 2021-01-20

## 2021-01-19 RX ORDER — FENTANYL CITRATE 50 UG/ML
25 INJECTION, SOLUTION INTRAMUSCULAR; INTRAVENOUS
Status: DISCONTINUED | OUTPATIENT
Start: 2021-01-19 | End: 2021-01-19 | Stop reason: HOSPADM

## 2021-01-19 RX ADMIN — CEFAZOLIN 2 G: 1 INJECTION, POWDER, FOR SOLUTION INTRAMUSCULAR; INTRAVENOUS; PARENTERAL at 14:57

## 2021-01-19 RX ADMIN — INSULIN LISPRO 2 UNITS: 100 INJECTION, SOLUTION INTRAVENOUS; SUBCUTANEOUS at 12:32

## 2021-01-19 RX ADMIN — LAMOTRIGINE 150 MG: 100 TABLET ORAL at 19:11

## 2021-01-19 RX ADMIN — Medication 1 AMPULE: at 09:00

## 2021-01-19 RX ADMIN — OXYCODONE 15 MG: 5 TABLET ORAL at 01:29

## 2021-01-19 RX ADMIN — ONDANSETRON HYDROCHLORIDE 4 MG: 2 INJECTION, SOLUTION INTRAMUSCULAR; INTRAVENOUS at 16:35

## 2021-01-19 RX ADMIN — SODIUM CHLORIDE 125 ML/HR: 9 INJECTION, SOLUTION INTRAVENOUS at 02:08

## 2021-01-19 RX ADMIN — CLONAZEPAM 0.5 MG: 0.5 TABLET ORAL at 19:10

## 2021-01-19 RX ADMIN — Medication 10 ML: at 23:10

## 2021-01-19 RX ADMIN — GABAPENTIN 600 MG: 300 CAPSULE ORAL at 09:30

## 2021-01-19 RX ADMIN — FENTANYL CITRATE 25 MCG: 50 INJECTION, SOLUTION INTRAMUSCULAR; INTRAVENOUS at 17:39

## 2021-01-19 RX ADMIN — CLONAZEPAM 0.5 MG: 0.5 TABLET ORAL at 09:30

## 2021-01-19 RX ADMIN — Medication 3 MG: at 16:34

## 2021-01-19 RX ADMIN — ACETAMINOPHEN 1000 MG: 500 TABLET ORAL at 06:41

## 2021-01-19 RX ADMIN — OXYCODONE 15 MG: 5 TABLET ORAL at 19:10

## 2021-01-19 RX ADMIN — OXYCODONE 15 MG: 5 TABLET ORAL at 11:47

## 2021-01-19 RX ADMIN — PROPOFOL 200 MG: 10 INJECTION, EMULSION INTRAVENOUS at 14:34

## 2021-01-19 RX ADMIN — KETAMINE HYDROCHLORIDE 20 MG: 10 INJECTION, SOLUTION INTRAMUSCULAR; INTRAVENOUS at 16:58

## 2021-01-19 RX ADMIN — SODIUM CHLORIDE, POTASSIUM CHLORIDE, SODIUM LACTATE AND CALCIUM CHLORIDE 100 ML/HR: 600; 310; 30; 20 INJECTION, SOLUTION INTRAVENOUS at 13:49

## 2021-01-19 RX ADMIN — SODIUM CHLORIDE 80 MCG/MIN: 900 INJECTION, SOLUTION INTRAVENOUS at 14:58

## 2021-01-19 RX ADMIN — OXYCODONE 15 MG: 5 TABLET ORAL at 04:37

## 2021-01-19 RX ADMIN — CEFAZOLIN SODIUM 2 G: 1 INJECTION, POWDER, FOR SOLUTION INTRAMUSCULAR; INTRAVENOUS at 23:30

## 2021-01-19 RX ADMIN — Medication 4.5 MG: at 23:07

## 2021-01-19 RX ADMIN — GLYCOPYRROLATE 0.6 MG: 0.2 INJECTION, SOLUTION INTRAMUSCULAR; INTRAVENOUS at 16:34

## 2021-01-19 RX ADMIN — FENTANYL CITRATE 25 MCG: 50 INJECTION, SOLUTION INTRAMUSCULAR; INTRAVENOUS at 17:22

## 2021-01-19 RX ADMIN — TRAZODONE HYDROCHLORIDE 200 MG: 100 TABLET ORAL at 23:08

## 2021-01-19 RX ADMIN — METFORMIN HYDROCHLORIDE 1000 MG: 500 TABLET ORAL at 19:10

## 2021-01-19 RX ADMIN — ROCURONIUM BROMIDE 10 MG: 10 INJECTION INTRAVENOUS at 14:50

## 2021-01-19 RX ADMIN — GABAPENTIN 600 MG: 300 CAPSULE ORAL at 23:08

## 2021-01-19 RX ADMIN — METFORMIN HYDROCHLORIDE 1000 MG: 500 TABLET ORAL at 09:30

## 2021-01-19 RX ADMIN — Medication 120 MCG: at 14:38

## 2021-01-19 RX ADMIN — TEMAZEPAM 30 MG: 30 CAPSULE ORAL at 23:06

## 2021-01-19 RX ADMIN — SODIUM CHLORIDE 125 ML/HR: 9 INJECTION, SOLUTION INTRAVENOUS at 10:32

## 2021-01-19 RX ADMIN — INSULIN LISPRO 4 UNITS: 100 INJECTION, SOLUTION INTRAVENOUS; SUBCUTANEOUS at 09:31

## 2021-01-19 RX ADMIN — KETAMINE HYDROCHLORIDE 30 MG: 10 INJECTION, SOLUTION INTRAMUSCULAR; INTRAVENOUS at 15:04

## 2021-01-19 RX ADMIN — DIAZEPAM 5 MG: 5 TABLET ORAL at 10:39

## 2021-01-19 RX ADMIN — ROCURONIUM BROMIDE 10 MG: 10 INJECTION INTRAVENOUS at 16:11

## 2021-01-19 RX ADMIN — DEXAMETHASONE SODIUM PHOSPHATE 8 MG: 4 INJECTION, SOLUTION INTRAMUSCULAR; INTRAVENOUS at 15:05

## 2021-01-19 RX ADMIN — DIAZEPAM 5 MG: 5 TABLET ORAL at 02:16

## 2021-01-19 RX ADMIN — FENTANYL CITRATE 100 MCG: 50 INJECTION, SOLUTION INTRAMUSCULAR; INTRAVENOUS at 14:34

## 2021-01-19 RX ADMIN — LIDOCAINE HYDROCHLORIDE 100 MG: 20 INJECTION, SOLUTION INTRAVENOUS at 14:34

## 2021-01-19 RX ADMIN — WATER 2 G: 1 INJECTION INTRAMUSCULAR; INTRAVENOUS; SUBCUTANEOUS at 14:50

## 2021-01-19 RX ADMIN — Medication 3 AMPULE: at 13:46

## 2021-01-19 RX ADMIN — ACETAMINOPHEN 1000 MG: 500 TABLET ORAL at 19:10

## 2021-01-19 RX ADMIN — ACETAMINOPHEN 1000 MG: 500 TABLET ORAL at 00:30

## 2021-01-19 RX ADMIN — LITHIUM CARBONATE 600 MG: 300 TABLET, FILM COATED, EXTENDED RELEASE ORAL at 23:06

## 2021-01-19 RX ADMIN — PROPRANOLOL HYDROCHLORIDE 20 MG: 10 TABLET ORAL at 09:30

## 2021-01-19 RX ADMIN — SODIUM CHLORIDE, POTASSIUM CHLORIDE, SODIUM LACTATE AND CALCIUM CHLORIDE: 600; 310; 30; 20 INJECTION, SOLUTION INTRAVENOUS at 16:11

## 2021-01-19 RX ADMIN — CLONAZEPAM 0.25 MG: 0.5 TABLET ORAL at 23:09

## 2021-01-19 RX ADMIN — ROCURONIUM BROMIDE 5 MG: 10 INJECTION INTRAVENOUS at 14:34

## 2021-01-19 RX ADMIN — CEFAZOLIN SODIUM 2 G: 1 INJECTION, POWDER, FOR SOLUTION INTRAMUSCULAR; INTRAVENOUS at 08:41

## 2021-01-19 RX ADMIN — SUCCINYLCHOLINE CHLORIDE 160 MG: 20 INJECTION, SOLUTION INTRAMUSCULAR; INTRAVENOUS at 14:34

## 2021-01-19 RX ADMIN — OXYCODONE 15 MG: 5 TABLET ORAL at 08:38

## 2021-01-19 RX ADMIN — DOCUSATE SODIUM - SENNOSIDES 1 TABLET: 50; 8.6 TABLET, FILM COATED ORAL at 19:11

## 2021-01-19 RX ADMIN — ROCURONIUM BROMIDE 35 MG: 10 INJECTION INTRAVENOUS at 14:45

## 2021-01-19 RX ADMIN — Medication 120 MCG: at 14:46

## 2021-01-19 RX ADMIN — HYDROMORPHONE HYDROCHLORIDE 1 MG: 1 INJECTION, SOLUTION INTRAMUSCULAR; INTRAVENOUS; SUBCUTANEOUS at 21:02

## 2021-01-19 RX ADMIN — DOCUSATE SODIUM - SENNOSIDES 1 TABLET: 50; 8.6 TABLET, FILM COATED ORAL at 09:30

## 2021-01-19 RX ADMIN — MIDAZOLAM HYDROCHLORIDE 2 MG: 1 INJECTION, SOLUTION INTRAMUSCULAR; INTRAVENOUS at 14:26

## 2021-01-19 NOTE — PERIOP NOTES
TRANSFER - IN REPORT:    Verbal report received from Arina Cutler RN(name) on [facility]  being received from [facility](unit) for ordered procedure      Report consisted of patients Situation, Background, Assessment and   Recommendations(SBAR). Information from the following report(s) SBAR, Kardex, Intake/Output, MAR, Recent Results, Med Rec Status and Procedure Verification was reviewed with the receiving nurse. Opportunity for questions and clarification was provided. Assessment completed upon patients arrival to unit and care assumed.

## 2021-01-19 NOTE — OP NOTES
[address] 
OPERATIVE REPORT Name:  Natasha Pate 
MR#:  [id number] :  1970 ACCOUNT #:  [de-identified] DATE OF SERVICE:  2021 PREOPERATIVE DIAGNOSES: 
1. Lumbar spinal stenosis with claudication. 2.  Lumbago. 3.  Sciatica. 4.  Lumbar spondylosis. 5.  Previous lumbar fusion L4 through S1. 
6.  Lumbar spondylolisthesis L3-4. POSTOPERATIVE DIAGNOSES: 
1. Lumbar spinal stenosis with claudication. 2.  Lumbago. 3.  Sciatica. 4.  Lumbar spondylosis. 5.  Previous lumbar fusion L4 through S1. 
6.  Lumbar spondylolisthesis L3-4. PROCEDURES PERFORMED: 1. L3-4 anterior fusion. 2.  L3-4 anterior instrumentation. 3.  L3-4 insertion of interbody biomechanical device. 4.  Use of allograft bone for spine fusion. 5.  Use of bone marrow aspirate from the right anterior-superior iliac spine for spinal fusion augmentation. SURGEON:  Cb De Jesus MD 
 
ASSISTANT:  GENE Grubbs 
 
ANESTHESIA:  General. 
 
COMPLICATIONS:  None. SPECIMENS REMOVED:  None. IMPLANTS:  Globus RISE-L interbody biomechanical device and the 1024 [place] plate. Please note that the plate and the interbody biomechanical device were completely separate and independent implants that are not related to each other. ESTIMATED BLOOD LOSS:  50 mL. DRAINS:  Times none. NARRATIVE OF THE PROCEDURE:  After informed consent was obtained and the operative site was properly marked, the patient was moved back to the operating room and underwent general endotracheal anesthesia.  She was positioned on the lateral decubitus with the right side up using the regular OR bed.  All the bony extremities were well padded and knees were gently bent with pillows.  Fluoroscopy was used to angelic the level of the incision.  We then proceeded to prep and drape in the usual manner.  Time-out was obtained verifying that this is the correct patient, the correct surgery, the correct site as well as that she had received IV antibiotics within 30 minutes of the incision.  In this case, she received IV Ancef.  I then proceeded to perform my standard anterior approach for an OLIF from the right-sided to L3-4.  I split the abdominal musculature in layers and entered the retroperitoneal space and retracted the peritoneal contents anteriorly, the psoas muscle posteriorly and exposed the disk space at L3-4.  Once the disk was exposed, I proceeded to place a self-retaining retractor in that area and I proceeded to use a #15 blade to perform a box annulotomy and then removed the ends with a pituitary.  I proceeded to use a Dominguez to detach the cartilaginous endplate both superiorly and inferiorly then used a box shaver to complete the diskectomy.  A trial was then used to determine the size for the implant and while the implant was being loaded, I inserted a Jamshidi needle on the right anterior-superior iliac spine through a separate fascial incision and aspirated 20 mL of bone marrow, used that to augment the bone graft used in this procedure.  I then proceeded to use the bone marrow to mix it with the bone graft packed in the interbody biomechanical device and inserted at L3-4 for the anterior fusion and insertion of interbody biomechanical device.  Once in the proper location, it was deployed to its final height.   Once in its final height. It was backfilled with allograft bone. Once that was completed, I proceeded to select the plate and drilled for a screw at L3, another one at L4. Once the screws were in place and locked. I proceeded to obtain final AP and lateral x-rays and proceeded to save them to PACS and proceeded to close the fascia with #1 Vicryl followed by irrigating the subcu, closed subcu with 2-0 Vicryl and the skin with a 3-0 running Monocryl and Dermabond. Sterile dressing was applied. The patient was then awakened and transferred to PACU in stable condition. POSTOPERATIVE PLAN:  The patient is going to remain here overnight. We are going to give her SCDs and RON hose for DVT prophylaxis and Ancef for infection prophylaxis. We are also going to ambulate with physical therapy and depending on her progress, we will determine what needs to be done surgically tomorrow. Marla Reyes MD 
 
 
[id number] 
D:  01/18/2021 16:20 
T:  01/18/2021 22:02 JOB #:  [id number] CC:  Kamryn Mccrary MD

## 2021-01-19 NOTE — ANESTHESIA PREPROCEDURE EVALUATION
Anesthetic History   No history of anesthetic complications            Review of Systems / Medical History  Patient summary reviewed, nursing notes reviewed and pertinent labs reviewed    Pulmonary  Within defined limits                 Neuro/Psych         Psychiatric history     Cardiovascular    Hypertension              Exercise tolerance: >4 METS     GI/Hepatic/Renal  Within defined limits              Endo/Other    Diabetes    Morbid obesity     Other Findings   Comments: PTSD  Bipolar  Dissociative identity disorder            Physical Exam    Airway  Mallampati: IV  TM Distance: 4 - 6 cm  Neck ROM: normal range of motion, short neck   Mouth opening: Diminished (comment)     Cardiovascular  Regular rate and rhythm,  S1 and S2 normal,  no murmur, click, rub, or gallop             Dental  No notable dental hx       Pulmonary  Breath sounds clear to auscultation               Abdominal  GI exam deferred       Other Findings            Anesthetic Plan    ASA: 3  Anesthesia type: general            Anesthetic plan and risks discussed with: Patient      Glidescope/Patel for intubation.

## 2021-01-19 NOTE — BRIEF OP NOTE
Brief Postoperative Note    Patient: Jaxon Mckeon  YOB: 1970  MRN: [id number]    Date of Procedure: 1/19/2021     Pre-Op Diagnosis: Chronic right-sided low back pain with right-sided sciatica [M54.41, G89.29]  Spinal stenosis, lumbar region, with neurogenic claudication [M48.062]  Spondylolisthesis of lumbar region [M43.16]  Lumbar pain [M54.5]  S/P spinal fusion [Z98.1]  Bilateral sciatica [M54.31, M54.32]  Chronic bilateral low back pain with bilateral sciatica [M54.42, M54.41, G89.29]  Osteoarthritis of spine with radiculopathy, lumbar region [M47.26]    Post-Op Diagnosis: Same as preoperative diagnosis. Procedure(s):  ROBOTIC ASSISTED L3-5 POSTERIOR REVISION DECOMPRESION AND FUSION WITH BONE MARROW ASPIRATION FROM ILIAC CREST    Surgeon(s):  Barbara Nolan MD    Surgical Assistant: Physician Assistant: Norberto Klinefelter, PA    Anesthesia: General     Estimated Blood Loss (mL): less than 50     Complications: None    Specimens: * No specimens in log *     Implants:   Implant Name Type Inv.  Item Serial No.  Lot No. LRB No. Used Action   CAP SPNL POST FACET JT QUIN FOR INTEGR ERNST REDUC CREO MIS - SN/A  CAP SPNL POST FACET JT QUIN FOR INTEGR ERNST REDUC CREO MIS N/A [employer]WD N/A N/A 6 Implanted   CREO MIS MOD POLYAX [place] 30MM - SN/A  CREO MIS MOD POLYAX [place] 30MM N/A [employer] N/A N/A 6 Implanted   OSTEOAMP FIBERS 5CC   [id number] Appointedd N/A N/A 1 Implanted   SCREW SPNL L45MM DIA6.5MM THORLUM JASON ADV MOD PLATFRM CREO - SN/A  SCREW SPNL L45MM DIA6.5MM THORLUM JASON ADV MOD PLATFRM CREO N/A [employer] N/A N/A 2 Implanted   SCR SPNE JASON THRD 7X40MM TI -- MATRIX - SN/A  SCREW SPNL L40MM DIA7MM PEDCL TI ALLY POLYAX JASON THRD MTRX N/A SYNTHES [place] N/A N/A 4 Explanted   reduction head   N/A SYNTHES SPINAL N/A N/A 4 Explanted   Locking Cap   N/A SYNTHES SPINAL N/A N/A 4 Explanted   SCREW SPNL L40MM OD7.5MM JASON MOD CREO - SN/A  SCREW SPNL L40MM OD7.5MM JASON MOD CREO N/A [employer]WD N/A N/A 4 Implanted   ERNST CRV MIS 5.5X45MM TI --  - SN/A  ERNST SPNL L45MM DIA5. 5MM RAD 100MM POST PEDCL PURE TI HRD N/A SYNTHES [place] N/A N/A 2 Explanted   ERNST SPNL 5.5X90MM CVD TI CREO MIS - SN/A  ERNST SPNL 5.5X90MM CVD TI CREO MIS N/A [employer] N/A N/A 2 Implanted       Drains: * No LDAs found *    Findings: Stenosis    Electronically Signed by Yesica Knox MD on 1/19/2021 at 4:35 PM

## 2021-01-19 NOTE — PERIOP NOTES
Handoff Report from Operating Room to PACU    Report received from [facility] AND [facility] and [address] regarding Padmaja Osuna. Surgeon(s):  Burgess Jero MD  And Procedure(s) (LRB):  ROBOTIC ASSISTED L3-5 POSTERIOR REVISION DECOMPRESION AND FUSION WITH BONE MARROW ASPIRATION FROM ILIAC CREST (N/A)  confirmed   with allergies and dressings discussed. Anesthesia type, drugs, patient history, complications, estimated blood loss, vital signs, intake and output, and last pain medication, lines, reversal medications and temperature were reviewed. 1730- Patient tolerating ice chips well. Denies nausea. Patient repositioned for comfort. 1750-TRANSFER - OUT REPORT:    Verbal report given to MIKEL Baxter RN(name) on Padmaja Osuna  being transferred to [facility] [place](unit) for routine progression of care       Report consisted of patients Situation, Background, Assessment and   Recommendations(SBAR). Information from the following report(s) OR Summary and Procedure Summary was reviewed with the receiving nurse. Lines:   Peripheral IV 01/18/21 Left Hand (Active)   Site Assessment Clean, dry, & intact 01/19/21 1745   Phlebitis Assessment 0 01/19/21 1745   Infiltration Assessment 0 01/19/21 1745   Dressing Status Clean, dry, & intact 01/19/21 1745   Dressing Type Tape;Transparent 01/19/21 1745   Hub Color/Line Status Pink; Infusing 01/19/21 1745   Alcohol Cap Used No 01/18/21 1950       Peripheral IV 01/19/21 Right Forearm (Active)   Site Assessment Clean, dry, & intact 01/19/21 1745   Phlebitis Assessment 0 01/19/21 1745   Infiltration Assessment 0 01/19/21 1745   Dressing Status Clean, dry, & intact 01/19/21 1745   Dressing Type Tape;Transparent 01/19/21 1745   Hub Color/Line Status Pink;Capped 01/19/21 1745        Opportunity for questions and clarification was provided.       Patient transported with:   [email or web address]

## 2021-01-19 NOTE — PERIOP NOTES
Handoff Report from Operating Room to PACU  4:25 PM  Report received from [address] and [employer] CRNA regarding Dianne Phoenix. Surgeon(s):  Elie Morris MD  And Procedure(s) (LRB):  L3-4 LATERAL FUSION  (N/A)  confirmed   with allergies and dressings discussed. Anesthesia type, drugs, patient history, complications, estimated blood loss, vital signs, intake and output, and last pain medication, lines, reversal medications and temperature were reviewed. 6:05 PM  TRANSFER - OUT REPORT:    Verbal report given to Raphael(name) on Dianne Phoenix  being transferred to Ortho (unit) for routine progression of care       Report consisted of patients Situation, Background, Assessment and   Recommendations(SBAR). Information from the following report(s) SBAR, Kardex, MAR and Accordion was reviewed with the receiving nurse. Lines:   Peripheral IV 01/18/21 Left Hand (Active)   Site Assessment Clean, dry, & intact 01/18/21 1815   Phlebitis Assessment 0 01/18/21 1815   Infiltration Assessment 0 01/18/21 1815   Dressing Status Clean, dry, & intact 01/18/21 1815   Dressing Type Transparent;Tape 01/18/21 1815   Hub Color/Line Status Pink;Capped 01/18/21 1815        Opportunity for questions and clarification was provided. Patient transported with:   O2 @ 2 liters  Registered Nurse  Tech      Spouse Jean Carlos aware of bed assignment. Pt sent upstairs with all belongings, including cell phone.

## 2021-01-19 NOTE — PROGRESS NOTES
End of Shift Note    Bedside shift change report given to Raphael GREER (oncoming nurse) by Judge Bill RN (offgoing nurse). Report included the following information SBAR and Kardex    Shift worked:  1900-0730     Shift summary and any significant changes:     none     Concerns for physician to address:  not at this time     Zone phone for oncoming shift:   NA       Activity:  Activity Level: Bed Rest  Number times ambulated in hallways past shift: 0  Number of times OOB to chair past shift: 0    Cardiac:   Cardiac Monitoring: No      Cardiac Rhythm: Normal sinus rhythm    Access:   Current line(s): PIV     Genitourinary:   Urinary status: deleon    Respiratory:   O2 Device: Nasal cannula  Chronic home O2 use?: NO  Incentive spirometer at bedside: YES     GI:     Current diet:  DIET NPO  Passing flatus: NO  Tolerating current diet: YES       Pain Management:   Patient states pain is manageable on current regimen: NO    Skin:  Larry Score: 19  Interventions: increase time out of bed    Patient Safety:  Fall Score:  Total Score: 2  Interventions: assistive device (walker, cane, etc), gripper socks and pt to call before getting OOB  High Fall Risk: Yes    Length of Stay:  Expected LOS: - - -  Actual LOS: 1      Judge Khan, RN

## 2021-01-19 NOTE — PROGRESS NOTES
Pt currently off the floor for 2nd part of surgery. Will follow back tomorrow morning to reassess functional mobility.

## 2021-01-19 NOTE — ANESTHESIA POSTPROCEDURE EVALUATION
Procedure(s):  ROBOTIC ASSISTED L3-5 POSTERIOR REVISION DECOMPRESION AND FUSION WITH BONE MARROW ASPIRATION FROM ILIAC CREST. general    Anesthesia Post Evaluation        Patient location during evaluation: PACU  Note status: Adequate. Level of consciousness: responsive to verbal stimuli and sleepy but conscious  Pain management: satisfactory to patient  Airway patency: patent  Anesthetic complications: no  Cardiovascular status: acceptable  Respiratory status: acceptable  Hydration status: acceptable  Comments: +Post-Anesthesia Evaluation and Assessment    Patient: Franky Rutledge MRN: [id number]  SSN: [id number]   YOB: 1970  Age: 52 y.o. Sex: female      Cardiovascular Function/Vital Signs    BP (!) 110/50   Pulse 70   Temp 36.8 °C (98.2 °F)   Resp 14   Wt 112.1 kg (247 lb 2.2 oz)   SpO2 98%   BMI 46.70 kg/m²     Patient is status post Procedure(s):  ROBOTIC ASSISTED L3-5 POSTERIOR REVISION DECOMPRESION AND FUSION WITH BONE MARROW ASPIRATION FROM ILIAC CREST. Nausea/Vomiting: Controlled. Postoperative hydration reviewed and adequate. Pain:  Pain Scale 1: Numeric (0 - 10) (01/19/21 1722)  Pain Intensity 1: 9 (01/19/21 1722)   Managed. Neurological Status:   Neuro (WDL): Exceptions to WDL (01/19/21 1655)   At baseline. Mental Status and Level of Consciousness: Arousable. Pulmonary Status:   O2 Device: Nasal cannula (01/19/21 1730)   Adequate oxygenation and airway patent. Complications related to anesthesia: None    Post-anesthesia assessment completed. No concerns. Signed By: Paulino Lopez MD    1/19/2021  Post anesthesia nausea and vomiting:  controlled      INITIAL Post-op Vital signs:   Vitals Value Taken Time   /50 01/19/21 1730   Temp 36.8 °C (98.2 °F) 01/19/21 1700   Pulse 70 01/19/21 1730   Resp 12 01/19/21 1730   SpO2 98 % 01/19/21 1730   Vitals shown include unvalidated device data.

## 2021-01-19 NOTE — PROGRESS NOTES
Problem: Mobility Impaired (Adult and Pediatric)  Goal: *Acute Goals and Plan of Care (Insert Text)  Description: FUNCTIONAL STATUS PRIOR TO ADMISSION: Patient was independent and active without use of DME.    HOME SUPPORT PRIOR TO ADMISSION: The patient lived with  but did not require assist.    Physical Therapy Goals  Initiated 1/19/2021  1. Patient will move from supine to sit and sit to supine  in bed with independence within 7 day(s). 2.  Patient will transfer from bed to chair and chair to bed with modified independence using the least restrictive device within 7 day(s). 3.  Patient will perform sit to stand with modified independence within 7 day(s). 4.  Patient will ambulate with modified independence for 150 feet with the least restrictive device within 7 day(s). 5.  Patient will ascend/descend 3 stairs with 0 handrail(s) with minimal assistance/contact guard assist within 7 day(s). Outcome: Not Met    PHYSICAL THERAPY EVALUATION  Patient: Benji Olmos (89 y.o. female)  Date: 1/19/2021  Primary Diagnosis: Chronic right-sided low back pain with right-sided sciatica [M54.41, G89.29]  Spinal stenosis, lumbar region, with neurogenic claudication [M48.062]  Spondylolisthesis of lumbar region [M43.16]  Lumbar pain [M54.5]  S/P spinal fusion [Z98.1]  Bilateral sciatica [M54.31, M54.32]  Chronic bilateral low back pain with bilateral sciatica [M54.42, M54.41, G89.29]  Osteoarthritis of spine with radiculopathy, lumbar region [M47.26]  Spinal stenosis of lumbar region at multiple levels [M48.061]  Procedure(s) (LRB):  L3-4 LATERAL FUSION  (N/A) 1 Day Post-Op   Precautions:   Spinal, Fall      ASSESSMENT  Based on the objective data described below, the patient presents with generalized weakness, increased pain, decreased activity tolerance, impaired balance and altered gait. Pt was received in supine and cleared by nursing to mobilize. She was able to perform log rolling to sit EOB.  Pt with poor pain control/tolerance despite medication. She  required assistance with donning brace while EOB. Stood with RW and able to ambulate short distance. LEs did not appear unstable but pt reported increased pain and weakness in RLE. She was left sitting up in the chair at the end of the session. Planned for 2nd part of surgery today. Current Level of Function Impacting Discharge (mobility/balance): CGA    Functional Outcome Measure: The patient scored Total: 40/100 on the Barthel Index which is indicative of 60% impaired ability to care for basic self needs/dependency on others. Other factors to consider for discharge: pain control may limit pt     Patient will benefit from skilled therapy intervention to address the above noted impairments. PLAN :  Recommendations and Planned Interventions: bed mobility training, transfer training, gait training, therapeutic exercises, patient and family training/education, and therapeutic activities      Frequency/Duration: Patient will be followed by physical therapy:  twice daily to address goals. Recommendation for discharge: (in order for the patient to meet his/her long term goals)  Physical therapy at least 2 days/week in the home AND ensure assist and/or supervision for safety with mobility    This discharge recommendation:  Has not yet been discussed the attending provider and/or case management    IF patient discharges home will need the following DME: rolling walker         SUBJECTIVE:   Patient stated i need one of those.  pointing to the walker    OBJECTIVE DATA SUMMARY:   HISTORY:    Past Medical History:   Diagnosis Date    Bipolar affective disorder ([address]     Dissociative identity disorder ([facility] [address]     Hyperlipidemia     Hypertension     Morbid obesity ([facility] [address]     NIDDM (non-insulin dependent diabetes mellitus)     type 2    Psychiatric disorder     Depression/Anxiety    PTSD (post-traumatic stress disorder)      Past Surgical History: Procedure Laterality Date    DELIVERY       HX BACK SURGERY      HX BREAST BIOPSY Left     u/s benign biopsy 3 years ago    HX CHOLECYSTECTOMY      HX TONSILLECTOMY      HX TUBAL LIGATION      MD REPAIR ACHILLES TENDON,PRIMARY         Personal factors and/or comorbidities impacting plan of care:     Home Situation  Home Environment: Private residence  # Steps to Enter: 3  Rails to Enter: No  One/Two Story Residence: Two story, live on 1st floor  Living Alone: No  Support Systems: Spouse/Significant Other/Partner  Current DME Used/Available at Home: Glucometer  Tub or Shower Type: Shower    EXAMINATION/PRESENTATION/DECISION MAKING:   Critical Behavior:  Neurologic State: Alert           Hearing:     Skin:  incision intact  Edema: WDL  Range Of Motion:  AROM: Generally decreased, functional           PROM: Generally decreased, functional           Strength:    Strength: Generally decreased, functional                    Tone & Sensation:   Tone: Normal              Sensation: Intact               Coordination:  Coordination: Within functional limits  Vision:      Functional Mobility:  Bed Mobility:  Rolling: Contact guard assistance  Supine to Sit: Contact guard assistance     Scooting: Contact guard assistance  Transfers:  Sit to Stand: Contact guard assistance  Stand to Sit: Contact guard assistance                       Balance:   Sitting: Intact  Standing: Impaired  Standing - Static: Fair;Constant support  Standing - Dynamic : Fair;Constant support  Ambulation/Gait Training:  Distance (ft): 20 Feet (ft)  Assistive Device: Gait belt;Walker, rolling;Brace/Splint  Ambulation - Level of Assistance: Contact guard assistance        Gait Abnormalities: Antalgic; Shuffling gait        Base of Support: Widened     Speed/Stacy: Pace decreased (<100 feet/min); Shuffled  Step Length: Left shortened;Right shortened             Functional Measure:  Barthel Index:    Bathin  Bladder: 0  Bowels: 10  Grooming: 0  Dressin  Feeding: 10  Mobility: 0  Stairs: 0  Toilet Use: 5  Transfer (Bed to Chair and Back): 10  Total: 40/100       The Barthel ADL Index: Guidelines  1. The index should be used as a record of what a patient does, not as a record of what a patient could do. 2. The main aim is to establish degree of independence from any help, physical or verbal, however minor and for whatever reason. 3. The need for supervision renders the patient not independent. 4. A patient's performance should be established using the best available evidence. Asking the patient, friends/relatives and nurses are the usual sources, but direct observation and common sense are also important. However direct testing is not needed. 5. Usually the patient's performance over the preceding 24-48 hours is important, but occasionally longer periods will be relevant. 6. Middle categories imply that the patient supplies over 50 per cent of the effort. 7. Use of aids to be independent is allowed. Lane Lindsey., Barthel, D.W. (8370). Functional evaluation: the Barthel Index. [address] [facility] (14)2. Ric Albert brock DESTINY Peterson, Marilin Hyatt., [facility]., [place], [address] [place] [address] (). Measuring the change indisability after inpatient rehabilitation; comparison of the responsiveness of the Barthel Index and Functional [place] Measure. Journal of Neurology, Neurosurgery, and Psychiatry, 66(4), 950-790. Blake Cherry, N.J.A, LURDES Muniz, & Tobias Vieyra M.A. (2004.) Assessment of post-stroke quality of life in cost-effectiveness studies: The usefulness of the Barthel Index and the EuroQoL-5D.  Quality of Life Research, 15, 263-31        Physical Therapy Evaluation Charge Determination   History Examination Presentation Decision-Making   HIGH Complexity :3+ comorbidities / personal factors will impact the outcome/ POC  MEDIUM Complexity : 3 Standardized tests and measures addressing body structure, function, activity limitation and / or participation in recreation  MEDIUM Complexity : Evolving with changing characteristics  Other outcome measures barthel  MEDIUM      Based on the above components, the patient evaluation is determined to be of the following complexity level: MEDIUM    Pain Ratin-8/10    Activity Tolerance:   Fair    After treatment patient left in no apparent distress:   Sitting in chair and Call bell within reach    COMMUNICATION/EDUCATION:   The patients plan of care was discussed with: Registered nurse. Fall prevention education was provided and the patient/caregiver indicated understanding. and Patient/family agree to work toward stated goals and plan of care.     Thank you for this referral.  Hernando Greene, PT, DPT   Time Calculation: 20 mins

## 2021-01-19 NOTE — PROGRESS NOTES
Occupational Therapy    Orders received and patient chart reviewed up to date. Pt currently off the floor for 2nd part of surgery. Will follow-up tomorrow for OT evaluation as appropriate. Thank you.   Rosa Maria Taylor, OTR/L

## 2021-01-20 ENCOUNTER — HOME HEALTH ADMISSION (OUTPATIENT)
Dept: HOME HEALTH SERVICES | Facility: HOME HEALTH | Age: 51
End: 2021-01-20
Payer: MEDICARE

## 2021-01-20 ENCOUNTER — APPOINTMENT (OUTPATIENT)
Dept: GENERAL RADIOLOGY | Age: 51
DRG: 454 | End: 2021-01-20
Attending: ORTHOPAEDIC SURGERY
Payer: MEDICARE

## 2021-01-20 LAB
GLUCOSE BLD STRIP.AUTO-MCNC: 133 MG/DL (ref 65–100)
GLUCOSE BLD STRIP.AUTO-MCNC: 147 MG/DL (ref 65–100)
GLUCOSE BLD STRIP.AUTO-MCNC: 176 MG/DL (ref 65–100)
GLUCOSE BLD STRIP.AUTO-MCNC: 214 MG/DL (ref 65–100)
HGB BLD-MCNC: 9.6 G/DL (ref 11.5–16)
SERVICE CMNT-IMP: ABNORMAL

## 2021-01-20 PROCEDURE — 51798 US URINE CAPACITY MEASURE: CPT

## 2021-01-20 PROCEDURE — 77010033678 HC OXYGEN DAILY

## 2021-01-20 PROCEDURE — 74011250636 HC RX REV CODE- 250/636: Performed by: ORTHOPAEDIC SURGERY

## 2021-01-20 PROCEDURE — 97535 SELF CARE MNGMENT TRAINING: CPT | Performed by: OCCUPATIONAL THERAPIST

## 2021-01-20 PROCEDURE — 77030019905 HC CATH URETH INTMIT MDII -A

## 2021-01-20 PROCEDURE — 36415 COLL VENOUS BLD VENIPUNCTURE: CPT

## 2021-01-20 PROCEDURE — 97530 THERAPEUTIC ACTIVITIES: CPT

## 2021-01-20 PROCEDURE — 82962 GLUCOSE BLOOD TEST: CPT

## 2021-01-20 PROCEDURE — 85018 HEMOGLOBIN: CPT

## 2021-01-20 PROCEDURE — 74011636637 HC RX REV CODE- 636/637: Performed by: ORTHOPAEDIC SURGERY

## 2021-01-20 PROCEDURE — 94760 N-INVAS EAR/PLS OXIMETRY 1: CPT

## 2021-01-20 PROCEDURE — 74011250637 HC RX REV CODE- 250/637: Performed by: ORTHOPAEDIC SURGERY

## 2021-01-20 PROCEDURE — 72100 X-RAY EXAM L-S SPINE 2/3 VWS: CPT

## 2021-01-20 PROCEDURE — 74011000250 HC RX REV CODE- 250: Performed by: ORTHOPAEDIC SURGERY

## 2021-01-20 PROCEDURE — 65270000029 HC RM PRIVATE

## 2021-01-20 PROCEDURE — 97116 GAIT TRAINING THERAPY: CPT

## 2021-01-20 PROCEDURE — 97165 OT EVAL LOW COMPLEX 30 MIN: CPT | Performed by: OCCUPATIONAL THERAPIST

## 2021-01-20 RX ADMIN — LITHIUM CARBONATE 600 MG: 300 TABLET, FILM COATED, EXTENDED RELEASE ORAL at 22:56

## 2021-01-20 RX ADMIN — PROPRANOLOL HYDROCHLORIDE 20 MG: 10 TABLET ORAL at 22:00

## 2021-01-20 RX ADMIN — CHOLECALCIFEROL TAB 25 MCG (1000 UNIT) 1 TABLET: 25 TAB at 08:35

## 2021-01-20 RX ADMIN — ACETAMINOPHEN 1000 MG: 500 TABLET ORAL at 17:09

## 2021-01-20 RX ADMIN — LAMOTRIGINE 150 MG: 100 TABLET ORAL at 17:09

## 2021-01-20 RX ADMIN — METFORMIN HYDROCHLORIDE 1000 MG: 500 TABLET ORAL at 16:09

## 2021-01-20 RX ADMIN — TEMAZEPAM 30 MG: 30 CAPSULE ORAL at 22:00

## 2021-01-20 RX ADMIN — Medication 10 ML: at 06:12

## 2021-01-20 RX ADMIN — TRAZODONE HYDROCHLORIDE 200 MG: 100 TABLET ORAL at 22:55

## 2021-01-20 RX ADMIN — INSULIN LISPRO 2 UNITS: 100 INJECTION, SOLUTION INTRAVENOUS; SUBCUTANEOUS at 12:15

## 2021-01-20 RX ADMIN — ATORVASTATIN CALCIUM 40 MG: 40 TABLET, FILM COATED ORAL at 08:37

## 2021-01-20 RX ADMIN — SODIUM CHLORIDE 125 ML/HR: 9 INJECTION, SOLUTION INTRAVENOUS at 11:23

## 2021-01-20 RX ADMIN — DOCUSATE SODIUM - SENNOSIDES 1 TABLET: 50; 8.6 TABLET, FILM COATED ORAL at 17:09

## 2021-01-20 RX ADMIN — Medication 10 ML: at 22:00

## 2021-01-20 RX ADMIN — CEFAZOLIN SODIUM 2 G: 1 INJECTION, POWDER, FOR SOLUTION INTRAMUSCULAR; INTRAVENOUS at 08:38

## 2021-01-20 RX ADMIN — Medication 1 AMPULE: at 21:00

## 2021-01-20 RX ADMIN — INSULIN LISPRO 4 UNITS: 100 INJECTION, SOLUTION INTRAVENOUS; SUBCUTANEOUS at 17:09

## 2021-01-20 RX ADMIN — FAMOTIDINE 20 MG: 20 TABLET, FILM COATED ORAL at 17:09

## 2021-01-20 RX ADMIN — CLONAZEPAM 0.5 MG: 0.5 TABLET ORAL at 17:09

## 2021-01-20 RX ADMIN — CLONAZEPAM 0.5 MG: 0.5 TABLET ORAL at 08:37

## 2021-01-20 RX ADMIN — Medication 10 ML: at 06:11

## 2021-01-20 RX ADMIN — METFORMIN HYDROCHLORIDE 1000 MG: 500 TABLET ORAL at 08:36

## 2021-01-20 RX ADMIN — Medication 1 AMPULE: at 08:39

## 2021-01-20 RX ADMIN — ACETAMINOPHEN 1000 MG: 500 TABLET ORAL at 06:07

## 2021-01-20 RX ADMIN — OXYCODONE 10 MG: 5 TABLET ORAL at 06:07

## 2021-01-20 RX ADMIN — FAMOTIDINE 20 MG: 20 TABLET, FILM COATED ORAL at 12:15

## 2021-01-20 RX ADMIN — Medication 10 ML: at 13:13

## 2021-01-20 RX ADMIN — GABAPENTIN 600 MG: 300 CAPSULE ORAL at 22:55

## 2021-01-20 RX ADMIN — OXYCODONE 15 MG: 5 TABLET ORAL at 16:09

## 2021-01-20 RX ADMIN — CLONAZEPAM 0.25 MG: 0.5 TABLET ORAL at 22:10

## 2021-01-20 RX ADMIN — POLYETHYLENE GLYCOL 3350 17 G: 17 POWDER, FOR SOLUTION ORAL at 08:35

## 2021-01-20 RX ADMIN — OXYCODONE 15 MG: 5 TABLET ORAL at 02:06

## 2021-01-20 RX ADMIN — OXYCODONE 10 MG: 5 TABLET ORAL at 10:02

## 2021-01-20 RX ADMIN — THERA TABS 1 TABLET: TAB at 08:38

## 2021-01-20 RX ADMIN — ACETAMINOPHEN 1000 MG: 500 TABLET ORAL at 12:15

## 2021-01-20 RX ADMIN — OXYCODONE 15 MG: 5 TABLET ORAL at 19:03

## 2021-01-20 RX ADMIN — GABAPENTIN 600 MG: 300 CAPSULE ORAL at 16:09

## 2021-01-20 RX ADMIN — Medication 4.5 MG: at 22:53

## 2021-01-20 RX ADMIN — GABAPENTIN 600 MG: 300 CAPSULE ORAL at 08:37

## 2021-01-20 RX ADMIN — INSULIN LISPRO 2 UNITS: 100 INJECTION, SOLUTION INTRAVENOUS; SUBCUTANEOUS at 08:34

## 2021-01-20 RX ADMIN — DOCUSATE SODIUM - SENNOSIDES 1 TABLET: 50; 8.6 TABLET, FILM COATED ORAL at 08:36

## 2021-01-20 RX ADMIN — LAMOTRIGINE 150 MG: 100 TABLET ORAL at 08:36

## 2021-01-20 RX ADMIN — OXYCODONE 15 MG: 5 TABLET ORAL at 13:11

## 2021-01-20 RX ADMIN — DIAZEPAM 5 MG: 5 TABLET ORAL at 20:10

## 2021-01-20 NOTE — PROGRESS NOTES
Bedside and Verbal shift change report given to Radha (oncoming nurse) by Teri Pat (offgoing nurse). Report included the following information SBAR, Kardex, Intake/Output, MAR and Recent Results.

## 2021-01-20 NOTE — PROGRESS NOTES
Ortho / Neurosurgery NP Note    POD# 1  s/p ROBOTIC ASSISTED L3-5 POSTERIOR REVISION DECOMPRESION AND FUSION WITH BONE MARROW ASPIRATION FROM ILIAC CREST   POD#2 s/p L3-4 LATERAL FUSION  Pt seen with caregiver at bedside. Pt resting in bed. Recently seen and cleared PT. Did well with therapy, now feelings worn out   Complaints expected incisional pain relieved by prn oxycodone. Also c/o BLE pain and weakness in RLE   Tolerating regular diet; states poor intake due to food choices. Nausea earlier, currently denies nausea. VSS Afebrile. Room air.      Visit Vitals  BP (!) 106/56 (BP 1 Location: Left arm, BP Patient Position: Sitting;Post activity)   Pulse 80   Temp 98.3 °F (36.8 °C)   Resp 16   Wt 112.1 kg (247 lb 2.2 oz)   LMP 01/13/2021   SpO2 97%   BMI 46.70 kg/m²       Voiding status: Straight cathx1 overnight - voided 150ml - check PVR after next void  Output (mL)  Urine Voided: 150 ml (01/20/21 0930)  Last Bowel Movement Date: 01/18/21 (01/19/21 7264)  Straight Cath  Straight Cath: Nurse performed cath;Sterile technique used (01/20/21 0330)  Number of Attempts: 1 (01/20/21 0330)  Catheter Size: 14 FR (01/20/21 0330)  Time Catheter Inserted: 0330 (01/20/21 0330)  Time Catheter Removed: 0336 (01/20/21 0330)  Urine: 900 mL (01/20/21 0330)      Labs    Lab Results   Component Value Date/Time    HGB 9.6 (L) 01/20/2021 03:23 AM      Lab Results   Component Value Date/Time    INR 1.0 01/11/2021 03:57 PM      Lab Results   Component Value Date/Time    Sodium 133 (L) 01/11/2021 03:57 PM    Potassium 4.8 01/11/2021 03:57 PM    Chloride 100 01/11/2021 03:57 PM    CO2 29 01/11/2021 03:57 PM    Glucose 122 (H) 01/11/2021 03:57 PM    BUN 18 01/11/2021 03:57 PM    Creatinine 0.76 01/11/2021 03:57 PM    Calcium 9.4 01/11/2021 03:57 PM     Recent Glucose Results:   Lab Results   Component Value Date/Time    GLUCPOC 133 (H) 01/20/2021 07:00 AM    GLUCPOC 212 (H) 01/19/2021 09:32 PM    GLUCPOC 148 (H) 01/19/2021 04:59 PM           Body mass index is 46.7 kg/m². : A BMI > 30 is classified as obesity and > 40 is classified as morbid obesity. Lateral prineo dressing c.d.i. Posterior JAMES dressing with scant sanguinous/old breakthrough drainage   Calves soft and supple; No pain with passive stretch  Sensation and motor intact - BLE PF/DF 4+/5 +EHL. Unable to left RLE off bed. No issues lifting LLE. SCDs for mechanical DVT proph while in bed     PLAN:  1) Neurovascular assessment q4 hours   2) PT/OT - LSO when oob  3) Pain control - scheduled tylenol, prn oxycodone   4) HTN - BP soft. Propanolol and lisinopril held. Low urine output this morning - continue PIV fluids. 5) DM2 - glucose 133-212. Continue Metformin, SSI while inpatient. 6) Readniess for discharge:     [x] Vital Signs stable    [x] Hgb stable    [x] + Voiding    [x] Wound intact, drainage minimal    [x] Tolerating PO intake     [] Cleared by PT (OT if applicable)     [] Stair training completed (if applicable)    [] Independent / Contact Guard Assist (household distance)     [] Bed mobility     [] Car transfers     [] ADLs    [x] Adequate pain control on oral medication alone     Home today vs tomorrow pending progress with therapy and voiding status. Plans to return home with SO and HH.      Zahra Carey NP

## 2021-01-20 NOTE — PROGRESS NOTES
01/20/21 0857 01/20/21 0902 01/20/21 0903   Vital Signs   Heart Rate Source Monitor  --   --    BP (!) 107/56 (!) 118/102 (!) 111/91   MAP (Calculated) 73 107 98   BP 1 Location Left arm Left arm Left arm   BP 1 Method Automatic Automatic Automatic   BP Patient Position Supine;Pre-activity Sitting  (EOB) Standing   O2 Sat (%) 97 %  --   --    O2 Device Room air  --   --       01/20/21 0930   Vital Signs   Heart Rate Source  --    BP (!) 106/56   MAP (Calculated) 73   BP 1 Location Left arm   BP 1 Method Automatic   BP Patient Position Sitting;Post activity   O2 Sat (%)  --    O2 Device  --

## 2021-01-20 NOTE — PROGRESS NOTES
End of Shift Note    Bedside shift change report given to Kevin Donnelly (oncoming nurse) by Kay Iraheta (offgoing nurse). Report included the following information SBAR, Kardex, Intake/Output, MAR, Recent Results and Med Rec Status    Shift worked:  5095-4385     Shift summary and any significant changes:     Patient complaining of uncontrolled pain, patient had low BP so inderal held. Patient did not void on own so straight cathed at [address]. Concerns for physician to address:  BP med parameters     Zone phone for oncoming shift:   [id number]       Activity:  Activity Level: Up with Assistance  Number times ambulated in hallways past shift: 0  Number of times OOB to chair past shift: 0    Cardiac:   Cardiac Monitoring: No      Cardiac Rhythm: Normal sinus rhythm    Access:   Current line(s): PIV     Genitourinary:   Urinary status: due to void    Respiratory:   O2 Device: Nasal cannula  Chronic home O2 use?: NO  Incentive spirometer at bedside: YES     GI:  Last Bowel Movement Date: 01/18/21  Current diet:  DIET NPO  Passing flatus: YES  Tolerating current diet: YES       Pain Management:   Patient states pain is manageable on current regimen: NO    Skin:  Larry Score: 19  Interventions: increase time out of bed    Patient Safety:  Fall Score:  Total Score: 2  Interventions: assistive device (walker, cane, etc) and gripper socks  High Fall Risk: Yes    Length of Stay:  Expected LOS: 3d 21h  Actual LOS: 2      Leonela M BoNorth Mississippi Medical Centert

## 2021-01-20 NOTE — PROGRESS NOTES
ORTHO POST OP SPINE PROGRESS NOTE    2021  Admit Date: 2021  Admit Diagnosis: Chronic right-sided low back pain with right-sided sciatica [M54.41, G89.29]  Spinal stenosis, lumbar region, with neurogenic claudication [M48.062]  Spondylolisthesis of lumbar region [M43.16]  Lumbar pain [M54.5]  S/P spinal fusion [Z98.1]  Bilateral sciatica [M54.31, M54.32]  Chronic bilateral low back pain with bilateral sciatica [M54.42, M54.41, G89.29]  Osteoarthritis of spine with radiculopathy, lumbar region [M47.26]  Spinal stenosis of lumbar region at multiple levels [M48.061]  Procedure: Procedure(s):  ROBOTIC ASSISTED L3-5 POSTERIOR REVISION DECOMPRESION AND FUSION WITH BONE MARROW ASPIRATION FROM ILIAC CREST  Post Op day: 1 Day Post-Op    Subjective:     Lucy Mcduffie is a patient who has complaints Pain in the low back and right lower extremity  status post L3-4 lateral fusion with right-sided approach postop day 2. Postop day 1 status post L3 through L5 posterior fusion. Tolerating p.o., able to void. Preoperatively the bulk of her pain has been the right thigh. Review of Systems: Pertinent items are noted in HPI. Objective:     PT/OT:   Distance Ambulated:           Time Ambulated (min):        Ambulation Response: Activity Response: Tolerated well  Assistive Device:              Assistive Device: Fall prevention device    Vital Signs:    Blood pressure (!) 105/54, pulse 82, temperature 98 °F (36.7 °C), resp. rate 17, weight 112.1 kg (247 lb 2.2 oz), last menstrual period 2021, SpO2 100 %. Temp (24hrs), Av.1 °F (36.7 °C), Min:97.6 °F (36.4 °C), Max:98.5 °F (36.9 °C)      No intake/output data recorded.    1901 -  0700  In: 1400 [I.V.:1400]  Out: 2185 [Urine:2085]    LAB:    Recent Labs     21  0323   HGB 9.6*       Wound/Drain Assessment:  Drain:      Dressing:     Physical Exam:  Neurological: no deficit  Incision clean, dry, and intact and [facility] suction  5/5 strength bilateral lower extremities with exception of the right hip flexors,  straight leg raise related to pain    Assessment:      Patient Active Problem List   Diagnosis Code    Bipolar disorder, mixed ([facility] [address] F31.60    Hyponatremia E87.1    Spondylisthesis M43.10    S/P lumbar spinal fusion Z98.1    Spinal stenosis of lumbar region at multiple levels M48.061       Plan:     Continue PT/OT/Rehab  Discontinue: IV  Consult: PT  and OT    Discharge To: Home.   Today versus tomorrow pending progress   Expected postop pain due to surgical approach

## 2021-01-20 NOTE — PROGRESS NOTES
FUNCTIONAL STATUS PRIOR TO ADMISSION: Patient was independent and active without use of DME. Was performing all ADLS and IADLS without assist but limited by pain    HOME SUPPORT PRIOR TO ADMISSION: The patient lived with  but did not need assist.    Occupational Therapy Goals:  Initiated 1/20/2021  1. Patient will perform grooming standing with modified independence within 7 days. 2. Patient will perform upper body dressing and lower body dressing with modified independence using AE within 7 days. 3. Patient will perform toileting with modified independence within 7 days. 4. Patient will adhere to 3:3 LS precautions without cues during functional tasks within 7 days. 5. Patient will transfer from toilet with modified independence using the least restrictive device and appropriate durable medical equipment within 7 days. OCCUPATIONAL THERAPY EVALUATION  Patient: Maren Hurd (89 y.o. female)  Date: 1/20/2021  Primary Diagnosis: Chronic right-sided low back pain with right-sided sciatica [M54.41, G89.29]  Spinal stenosis, lumbar region, with neurogenic claudication [M48.062]  Spondylolisthesis of lumbar region [M43.16]  Lumbar pain [M54.5]  S/P spinal fusion [Z98.1]  Bilateral sciatica [M54.31, M54.32]  Chronic bilateral low back pain with bilateral sciatica [M54.42, M54.41, G89.29]  Osteoarthritis of spine with radiculopathy, lumbar region [M47.26]  Spinal stenosis of lumbar region at multiple levels [M48.061]  Procedure(s) (LRB):  ROBOTIC ASSISTED L3-5 POSTERIOR REVISION DECOMPRESION AND FUSION WITH BONE MARROW ASPIRATION FROM ILIAC CREST (N/A) 1 Day Post-Op   Precautions:   Spinal, Fall    ASSESSMENT  Based on the objective data described below, the patient presents with increased pain and flat affect this session. Pts  was present and supportive. Pt has had previous LS surgery and is familiar with precautions and adaptive strategies due to this.   Pt reports that she got rid of all her DME. Issued new hip kit as pt is unable to reach LE with LS precautions. Increased time needed for all mobility and transitions but speed and balance improved over time. Pt was able to toilet with SBA managing hospital gown over he backside. Min assist for donning quick draw brace seated EOB. Overall pt requires SBA for ADL mobility and ADLS at a independent to moderate assist level. Current Level of Function Impacting Discharge (ADLs/self-care): CGA log roll out of bed    Feeding: Independent    Oral Facial Hygiene/Grooming: Contact guard assistance    Bathing: Moderate assistance(issued hip kit)    Upper Body Dressing: Minimum assistance(including quick draw brace)    Lower Body Dressing: Moderate assistance(issued hip kit, pt is familiar with use)    Toileting: Minimum assistance    Functional Outcome Measure: The patient scored 70/100 on the barthel outcome measure which is indicative of barthel. Other factors to consider for discharge: needs initial assist at home, high pain with activity     Patient will benefit from skilled therapy intervention to address the above noted impairments. PLAN :  Recommendations and Planned Interventions: self care training, functional mobility training, therapeutic exercise, balance training, therapeutic activities, patient education, home safety training and family training/education    Frequency/Duration: Patient will be followed by occupational therapy 5 times a week to address goals.     Recommendation for discharge: (in order for the patient to meet his/her long term goals)  Occupational therapy at least 2 days/week in the home AND ensure assist and/or supervision for safety with ADLs    This discharge recommendation:  Has been made in collaboration with the attending provider and/or case management    IF patient discharges home will need the following DME: walker: rolling, issued hip kit       SUBJECTIVE:   Patient stated I don't know if I can do it.    OBJECTIVE DATA SUMMARY:   HISTORY:   Past Medical History:   Diagnosis Date    Bipolar affective disorder ([facility] [address]     Dissociative identity disorder ([facility] [address]     Hyperlipidemia     Hypertension     Morbid obesity ([facility] [address]     NIDDM (non-insulin dependent diabetes mellitus)     type 2    Psychiatric disorder     Depression/Anxiety    PTSD (post-traumatic stress disorder)      Past Surgical History:   Procedure Laterality Date    DELIVERY       HX BACK SURGERY      HX BREAST BIOPSY Left     u/s benign biopsy 3 years ago    HX CHOLECYSTECTOMY      HX TONSILLECTOMY      HX TUBAL LIGATION      [place] [facility] [address]         Expanded or extensive additional review of patient history:     Home Situation  Home Environment: Private residence  # Steps to Enter: 3  Rails to Enter: No  One/Two Story Residence: Two story, live on 1st floor  Living Alone: No  Support Systems: Spouse/Significant Other/Partner  Current DME Used/Available at Home: Glucometer  Tub or Shower Type: Shower    Hand dominance: Right    EXAMINATION OF PERFORMANCE DEFICITS:  Cognitive/Behavioral Status:  Neurologic State: Alert  Orientation Level: Oriented X4  Cognition: Follows commands  Perception: Appears intact  Perseveration: No perseveration noted  Safety/Judgement: Awareness of environment; Fall prevention;Home safety       Vision/Perceptual:                           Acuity: Within Defined Limits         Range of Motion:    AROM: Generally decreased, functional  PROM: Generally decreased, functional                      Strength:    Strength: Generally decreased, functional                Coordination:  Coordination: Within functional limits  Fine Motor Skills-Upper: Left Intact; Right Intact    Gross Motor Skills-Upper: Left Intact; Right Intact    Tone & Sensation:    Tone: Normal  Sensation: Intact                      Balance:  Sitting: Intact  Standing: Intact; With support  Standing - Static: Constant support;Fair;Good  Standing - Dynamic : Fair;Constant support    Functional Mobility and Transfers for ADLs:  Bed Mobility:  Rolling: Contact guard assistance  Supine to Sit: Contact guard assistance  Scooting: Contact guard assistance    Transfers:  Sit to Stand: Stand-by assistance  Stand to Sit: Stand-by assistance  Bed to Chair: Stand-by assistance(with RW)  Bathroom Mobility: Stand-by assistance(with RW)  Toilet Transfer : Stand-by assistance    ADL Assessment:  Feeding: Independent    Oral Facial Hygiene/Grooming: Contact guard assistance    Bathing: Moderate assistance(issued hip kit)    Upper Body Dressing: Minimum assistance(including quick draw brace)    Lower Body Dressing: Moderate assistance(issued hip kit, pt is familiar with use)    Toileting: Minimum assistance                ADL Intervention and task modifications:  Patient instructed and demonstrated 3/3 back precautions with verbal cues. Cognitive Retraining  Safety/Judgement: Awareness of environment; Fall prevention;Home safety    Patient instructed and indicated understanding the benefits of maintaining activity tolerance, functional mobility, and independence with self care tasks during acute stay  to ensure safe return home and to baseline. Encouraged patient to increase frequency and duration OOB, not sitting longer than 30 mins without marching/walking with staff, be out of bed for all meals, perform daily ADLs (as approved by RN/MD regarding bathing etc), and performing functional mobility to/from bathroom. Patient instruction and indicated understanding on body mechanics, ergonomics and gravitational force on the spine during different body positions to plan activities in prep for return home to complete basic ADLs, instrumental ADLs and back to work safely.      Bathing: Patient instructed and indicated understanding when bathing to not submerge wound in water, sit on shower chair or sponge bathe, instructed pt to follow suregons instructions on when to start bathing and to not let water beat on incision. Dressing brace: Patient instructed and demonstrated to don/doff velcro on brace with cues to strap in correct order and assist to obtain quick draw strapping. Min assist overall needed to complete and pt needed assist to obtain brace from behind back seated EOB. Patient instructed on how to to stand with back against wall to don/doff brace, to don/doff seated using lap and bed/chair surface to support brace while manipulating. Dressing lower body: Patient instructed to don brace first and on the benefits to remain seated to don all clothing to increase independence with precautions and pain management. Patient was unable to tailor sit to perform lower body dressing. Pt was issued hip kit and had used hip kit on her previous surgery. She didn't have any concerns about using AE and declined using it today. Toileting: Patient instructed on the benefits of using flushable wet wipes for rosita care. Also, the benefits of a reacher to aid in clothing management. Patient instruction and indicated understanding to not strain i.e. holding breath to bear down during a bowel movement, lifting/activity, and sexual activity. Home safety: Patient instructed and indicated understanding on home modifications and safety [raise height of ADL objects (i.e. clothing, sink items, fridge items, items to mouth when grooming), appropriate height of chair surfaces, recliner safety, change of floor surfaces, clear pathways] to increase independence and fall prevention. Standing: Patient instructed and indicated understanding to walk up to sink/counter top/surfaces, step into walker, square off while using objects, slide objects along surfaces, to increase adherence to back precautions and fall prevention. Patient instructed to increase amount of time standing in order to increase independence and tolerance with ADLs.  During prolonged standing, can open cabinet door or place foot on stool to decrease spinal pressure/increase pain.   Shower transfer:  Pt educated to step in with stronger leg and out with weaker leg with assist from          Functional Measure:  Barthel Index:    Bathin  Bladder: 10  Bowels: 10  Groomin  Dressin  Feeding: 10  Mobility: 10  Stairs: 5  Toilet Use: 5  Transfer (Bed to Chair and Back): 10  Total: 70/100        The Barthel ADL Index: Guidelines  1. The index should be used as a record of what a patient does, not as a record of what a patient could do.  2. The main aim is to establish degree of independence from any help, physical or verbal, however minor and for whatever reason.  3. The need for supervision renders the patient not independent.  4. A patient's performance should be established using the best available evidence. Asking the patient, friends/relatives and nurses are the usual sources, but direct observation and common sense are also important. However direct testing is not needed.  5. Usually the patient's performance over the preceding 24-48 hours is important, but occasionally longer periods will be relevant.  6. Middle categories imply that the patient supplies over 50 per cent of the effort.  7. Use of aids to be independent is allowed.    Richie Velazquez., Barthel, DHardikW. (1965). Functional evaluation: the Barthel Index. Md State Med J (142.  DAYTON Mari.F, TOSIN Nash., Mo, JHardikA., Crawford, A.J. (1999). Measuring the change indisability after inpatient rehabilitation; comparison of the responsiveness of the Barthel Index and Functional [place] Measure. Journal of Neurology, Neurosurgery, and Psychiatry, 66(4), 480-484.  Michele Archer, N.J.A, LURDES Chavez, & Shashi, M.A. (2004.) Assessment of post-stroke quality of life in cost-effectiveness studies: The usefulness of the Barthel Index and the EuroQoL-5D. Quality of Life Research, 13, 427-43         Occupational  Therapy Evaluation Charge Determination   History Examination Decision-Making   LOW Complexity : Brief history review  LOW Complexity : 1-3 performance deficits relating to physical, cognitive , or psychosocial skils that result in activity limitations and / or participation restrictions  LOW Complexity : No comorbidities that affect functional and no verbal or physical assistance needed to complete eval tasks       Based on the above components, the patient evaluation is determined to be of the following complexity level: LOW   Pain Rating:  significant pain with activity at LS site    Activity Tolerance:   Fair and requires rest breaks    After treatment patient left in no apparent distress:    Sitting in chair, Call bell within reach and Caregiver / family present    COMMUNICATION/EDUCATION:   The patients plan of care was discussed with: Physical therapist, Registered nurse and patient and her . Home safety education was provided and the patient/caregiver indicated understanding., Patient/family have participated as able in goal setting and plan of care. and Patient/family agree to work toward stated goals and plan of care. This patients plan of care is appropriate for delegation to \[facility]\"".     Thank you for this referral.  Fabiano Weber OTR/L  Time Calculation: 47 mins

## 2021-01-20 NOTE — PROGRESS NOTES
Problem: Mobility Impaired (Adult and Pediatric)  Goal: *Acute Goals and Plan of Care (Insert Text)  Description: FUNCTIONAL STATUS PRIOR TO ADMISSION: Patient was independent and active without use of DME.    HOME SUPPORT PRIOR TO ADMISSION: The patient lived with  but did not require assist.    Physical Therapy Goals  Initiated 1/19/2021  1. Patient will move from supine to sit and sit to supine  in bed with independence within 7 day(s). 2.  Patient will transfer from bed to chair and chair to bed with modified independence using the least restrictive device within 7 day(s). 3.  Patient will perform sit to stand with modified independence within 7 day(s). 4.  Patient will ambulate with modified independence for 150 feet with the least restrictive device within 7 day(s). 5.  Patient will ascend/descend 3 stairs with 0 handrail(s) with minimal assistance/contact guard assist within 7 day(s). 3/88/1498 [id number] by Jessica Meza PT  Outcome: Progressing Towards Goal  PHYSICAL THERAPY TREATMENT  Patient: Dolores Sanchez (66 y.o. female)  Date: 1/20/2021  Diagnosis: Chronic right-sided low back pain with right-sided sciatica [M54.41, G89.29]  Spinal stenosis, lumbar region, with neurogenic claudication [M48.062]  Spondylolisthesis of lumbar region [M43.16]  Lumbar pain [M54.5]  S/P spinal fusion [Z98.1]  Bilateral sciatica [M54.31, M54.32]  Chronic bilateral low back pain with bilateral sciatica [M54.42, M54.41, G89.29]  Osteoarthritis of spine with radiculopathy, lumbar region [M47.26]  Spinal stenosis of lumbar region at multiple levels [M48.061] <principal problem not specified>  Procedure(s) (LRB):  ROBOTIC ASSISTED L3-5 POSTERIOR REVISION DECOMPRESION AND FUSION WITH BONE MARROW ASPIRATION FROM ILIAC CREST (N/A) 1 Day Post-Op  Precautions: Spinal, Fall  Chart, physical therapy assessment, plan of care and goals were reviewed.     ASSESSMENT  Patient continues with skilled PT services and is progressing towards goals. Pt was received in supine and asleep, cleared by nursing to mobilize. She was much more painful and shaky during PM session. She was able to come to the EOB. Stood with RW and only tolerate ambulating around the bed. She was returned to supine at the end of the session. Discussed with NP about pt tolerance session. Current Level of Function Impacting Discharge (mobility/balance): CGA    Other factors to consider for discharge: poor pain control         PLAN :  Patient continues to benefit from skilled intervention to address the above impairments. Continue treatment per established plan of care. to address goals. Recommendation for discharge: (in order for the patient to meet his/her long term goals)  Physical therapy at least 2 days/week in the home AND ensure assist and/or supervision for safety with mobility     This discharge recommendation:  Has been made in collaboration with the attending provider and/or case management    IF patient discharges home will need the following DME: rolling walker       SUBJECTIVE:   Patient stated ever since the x-ray my back has hurt so much. They did not have a walker.     OBJECTIVE DATA SUMMARY:   Critical Behavior:  Neurologic State: Alert  Orientation Level: Oriented X4  Cognition: Follows commands  Safety/Judgement: Awareness of environment, Fall prevention, Home safety  Functional Mobility Training:  Bed Mobility:  Rolling: Contact guard assistance  Supine to Sit: Contact guard assistance     Scooting: Contact guard assistance        Transfers:  Sit to Stand: Contact guard assistance  Stand to Sit: Contact guard assistance        Bed to Chair: Stand-by assistance(with RW)                    Balance:  Sitting: Intact  Standing: Intact; With support  Standing - Static: Constant support;Fair;Good  Standing - Dynamic : Fair;Constant support  Ambulation/Gait Training:  Distance (ft): 15 Feet (ft)  Assistive Device: Gait belt;Brace/Splint; Walker, rolling  Ambulation - Level of Assistance: Contact guard assistance        Gait Abnormalities: Antalgic        Base of Support: Widened     Speed/Stacy: Pace decreased (<100 feet/min); Shuffled  Step Length: Left shortened;Right shortened                Stairs:  Number of Stairs Trained: 4  Stairs - Level of Assistance: Minimum assistance;Assist X2   Rail Use: (HHA x 2)      Pain Rating:  10/10    Activity Tolerance:   Poor    After treatment patient left in no apparent distress:   Supine in bed and Call bell within reach    COMMUNICATION/COLLABORATION:   The patients plan of care was discussed with: Registered nurse and NP.      Beatriz Martinez, PT, DPT   Time Calculation: 16 mins

## 2021-01-20 NOTE — PROGRESS NOTES
Problem: Mobility Impaired (Adult and Pediatric)  Goal: *Acute Goals and Plan of Care (Insert Text)  Description: FUNCTIONAL STATUS PRIOR TO ADMISSION: Patient was independent and active without use of DME.    HOME SUPPORT PRIOR TO ADMISSION: The patient lived with  but did not require assist.    Physical Therapy Goals  Initiated 1/19/2021  1. Patient will move from supine to sit and sit to supine  in bed with independence within 7 day(s). 2.  Patient will transfer from bed to chair and chair to bed with modified independence using the least restrictive device within 7 day(s). 3.  Patient will perform sit to stand with modified independence within 7 day(s). 4.  Patient will ambulate with modified independence for 150 feet with the least restrictive device within 7 day(s). 5.  Patient will ascend/descend 3 stairs with 0 handrail(s) with minimal assistance/contact guard assist within 7 day(s). Outcome: Progressing Towards Goal    PHYSICAL THERAPY TREATMENT  Patient: Bridger Sinclair (64 y.o. female)  Date: 1/20/2021  Diagnosis: Chronic right-sided low back pain with right-sided sciatica [M54.41, G89.29]  Spinal stenosis, lumbar region, with neurogenic claudication [M48.062]  Spondylolisthesis of lumbar region [M43.16]  Lumbar pain [M54.5]  S/P spinal fusion [Z98.1]  Bilateral sciatica [M54.31, M54.32]  Chronic bilateral low back pain with bilateral sciatica [M54.42, M54.41, G89.29]  Osteoarthritis of spine with radiculopathy, lumbar region [M47.26]  Spinal stenosis of lumbar region at multiple levels [M48.061] <principal problem not specified>  Procedure(s) (LRB):  ROBOTIC ASSISTED L3-5 POSTERIOR REVISION DECOMPRESION AND FUSION WITH BONE MARROW ASPIRATION FROM ILIAC CREST (N/A) 1 Day Post-Op  Precautions: Spinal, Fall  Chart, physical therapy assessment, plan of care and goals were reviewed. ASSESSMENT  Patient continues with skilled PT services and is progressing towards goals.  Pt was received in supine and cleared by nursing to mobilize. Pt with pain control difficulty but no significant functional changes since 2nd part of surgery. She was able to perform log roll to come to the EOB. Donned brace with OT. Stood with RW and ambulated into the bathroom, voided. Ambulated into the jordan and then utilized wheelchair to transport pt to ortho gym. Negotiated stair training with HHA x 2 and min A x 2 (pt does not have rails). Performed car transfer without assistance. Ambulated a short distance back toward her room and then utilize wheelchair for the rest of the way. Pt completed house hold distance without safety issues. Left sitting up in the chair working with OT. Vitals:    01/20/21 0857 01/20/21 0902 01/20/21 0903 01/20/21 0930   BP: (!) 107/56 (!) 118/102 (!) 111/91 (!) 106/56   BP 1 Location: Left arm Left arm Left arm Left arm   BP Patient Position: Supine;Pre-activity Sitting  Comment: EOB Standing Sitting;Post activity   Pulse:       Resp:       Temp:       SpO2: 97%      Weight:            Current Level of Function Impacting Discharge (mobility/balance): CGA/SBA    Other factors to consider for discharge: poor pain tolerance/control         PLAN :  Patient continues to benefit from skilled intervention to address the above impairments. Continue treatment per established plan of care. to address goals. Recommendation for discharge: (in order for the patient to meet his/her long term goals)  Physical therapy at least 2 days/week in the home AND ensure assist and/or supervision for safety with mobility initally    This discharge recommendation:  Has not yet been discussed the attending provider and/or case management    IF patient discharges home will need the following DME: rolling walker       SUBJECTIVE:   Patient stated it hurts so bad.     OBJECTIVE DATA SUMMARY:   Critical Behavior:  Neurologic State: Alert  Orientation Level: Oriented X4  Cognition: Follows commands  Safety/Judgement: Awareness of environment, Fall prevention, Home safety  Functional Mobility Training:  Bed Mobility:  Rolling: Contact guard assistance  Supine to Sit: Contact guard assistance     Scooting: Contact guard assistance        Transfers:  Sit to Stand: Stand-by assistance  Stand to Sit: Stand-by assistance                             Balance:  Sitting: Intact  Standing: Intact; With support  Ambulation/Gait Training:  Distance (ft): 150 Feet (ft)  Assistive Device: Gait belt;Brace/Splint; Walker, rolling  Ambulation - Level of Assistance: Contact guard assistance        Gait Abnormalities: Antalgic; Shuffling gait        Base of Support: Widened     Speed/Stacy: Shuffled; Slow  Step Length: Left shortened;Right shortened                Stairs:  Number of Stairs Trained: 4  Stairs - Level of Assistance: Minimum assistance;Assist X2   Rail Use: (HHA x 2)    Pain Ratin/10    Activity Tolerance:   Fair    After treatment patient left in no apparent distress:   Sitting in chair and Call bell within reach    COMMUNICATION/COLLABORATION:   The patients plan of care was discussed with: Occupational therapist and Registered nurse.      Hernando Greene PT, DPT   Time Calculation: 45 mins

## 2021-01-20 NOTE — PROGRESS NOTES
COURTNEY  1) home with home health- referral sent to [facility]   2) RW- referral sent to [facility]   3)  to provide transportation at d/c    Reason for Admission: planned L3-5 posterior revision on decompression and fusion with bone marrow aspiration from iliac crest                      RUR Score: 14%                    Plan for utilizing home health: per recommendation          PCP: First and Last name:  Curtis Escobar    Name of Practice: [facility]    Are you a current patient: Yes/No: yes   Approximate date of last visit: 2 mo ago    Can you participate in a virtual visit with your PCP: yes                    Current Advanced Directive/Advance Care Plan: Misti Velásquez (ACP) Conversation      Date of Conversation: 1/20/21  Conducted with: Patient with [address] Decision Maker:     Click here to complete [address] including selection of the [address] Relationship (ie \"Primary\")  Today we documented Decision Maker(s) consistent with Legal Next of Kin hierarchy. Content/Action Overview:   DECLINED ACP conversation - will revisit periodically   Reviewed DNR/DNI and patient elects Full Code (Attempt Resuscitation)    Length of Voluntary ACP Conversation in minutes:  <16 minutes (Non-Billable)                         Transition of Care Plan:                      CM made room visit with patient who was alert and oriented with  at bedside. Pt confirmed demographics, insurance, and emergency contact on file. Pt uses [facility]. Pt, , 2 dogs, and a pet squirrel live in 2 story home, pt does not use 2nd floor, with 3 [address]. Pt has no DME at home, will need a RW prior to d/c. At baseline pt is independent with ADLs and driving. Per , prior to surgery, pt was only able to walk about 15-20 feet until she needed to sit down.  Pt has used HH through at home care in the past and denied any hx of rehab. Pt's plan is to d/c home with home health. Pt requested to use [facility] services. [address] signed by , per pt's request, and placed on bedside chart. Referral sent to bs and waiting for response. Referral also sent to [facility] for RW and waiting for response. Pt's  plans on transporting patient home at d/c.     Update 1:30pm  CM informed by [facility] that patient will need prior auth through [facility] for RW. CM contacted [facility] and left message requesting a return phone call. Care Management Interventions  PCP Verified by CM: Yes(last seen 2 months ago )  Mode of Transport at Discharge:  Other (see comment)( )  Transition of Care Consult (CM Consult): Discharge Planning, Home Health  Discharge Durable Medical Equipment: Yes(RW)  Physical Therapy Consult: Yes  Occupational Therapy Consult: Yes  Speech Therapy Consult: No  Current Support Network: Lives with Spouse, Own Home(pt and  live in a 2 story home with 3 e[address])  Confirm Follow Up Transport: Family  The Plan for Transition of Care is Related to the Following Treatment Goals : HH  The Patient and/or Patient Representative was Provided with a Choice of Provider and Agrees with the Discharge Plan?: Yes  Name of the Patient Representative Who was Provided with a Choice of Provider and Agrees with the Discharge Plan:    Freedom of Choice List was Provided with Basic Dialogue that Supports the Patient's Individualized Plan of Care/Goals, Treatment Preferences and Shares the Quality Data Associated with the Providers?: Yes  Discharge Location  Discharge Placement: Home with home health    Chavo De La Cruz, [facility]

## 2021-01-20 NOTE — OP NOTES
[address]  OPERATIVE REPORT    Name:  Margoth Maguire  MR#:  [id number]  :  1970  ACCOUNT #:  [de-identified]  DATE OF SERVICE:  2021    PREOPERATIVE DIAGNOSES:  1. Lumbar spondylolisthesis. 2.  Lumbago. 3.  Sciatica. 4.  Lumbar spinal stenosis. 5.  Foraminal stenosis. 6.  Lumbar spondylosis with radiculopathy. POSTOPERATIVE DIAGNOSES:  1. Lumbar spondylolisthesis. 2.  Lumbago. 3.  Sciatica. 4.  Lumbar spinal stenosis. 5.  Foraminal stenosis. 6.  Lumbar spondylosis with radiculopathy. PROCEDURES PERFORMED:  1. L3-4 posterior fusion. 2.  L3 through L5 posterior instrumentation. 3.  L3-4 laminectomy, facetectomy, foraminotomy for purpose of nerve decompression. 4.  Use of local autograft bone for spine fusion. 5.  Use of allograft bone for spine fusion. 6.  Bone marrow aspirate from the left posterior superior iliac spine for spinal fusion augmentation. 7.  Use of [email or web address] robotic system for placement of pedicle screws. SURGEON:  Candy Garcia MD    FIRST ASSISTANT:  GENE Jenkins  There was the need for a surgical assistant on this case to assist with:  1) Safe and proper retraction. 2) Maintenance of visualization during surgery by helping with suctioning. 3) Multiple tasks throughout the decompression and instrumentation to allow for timely and safe completion of the procedure. 4) Overall assistance helped decrease the risk of complications and infection. ANESTHESIA:  General.    COMPLICATIONS:  None. SPECIMENS REMOVED:  None. IMPLANTS:  [employer] pedicle screw system. ESTIMATED BLOOD LOSS:  100 mL. DRAINS:  None. INDICATIONS FOR THE PROCEDURE:  The patient is a pleasant 65-year-old lady with lumbar spinal stenosis due to spondylolisthesis. She has lumbago and sciatica and has failed to improve with nonoperative treatment. These problems is above a previous fusion.   At this point, we have discussed with her the benefits of an anterior approach. She underwent anterior approach yesterday and today ambulated with physical therapy. She reports no relief of her preoperative radicular symptoms and we have decided then to proceed with the decompression on the right side. NARRATIVE OF THE PROCEDURE:  After informed consent was obtained and the operative site was properly marked, the patient moved back to the operating room and underwent general endotracheal anesthesia. She was positioned prone on the operating table using the [employer] frame. Her arms were placed in the 90:90 position and the knees were gently bent with pillows. Fluoroscopy was used to angelic the level of the incision. We then proceeded to prep and drape in the usual manner. A time-out was obtained verifying that this is the correct patient, the correct surgery, the correct site as well as that she had received IV antibiotics within 30 minutes of the incision. I then proceeded to perform a stab incision over the left posterior superior iliac spine, used a Jamshidi needle to aspirate 20 mL of bone marrow and used it to augment the bone graft used in this fusion. I also placed a [employer] robotic navigation marker through that incision. I performed an incision on the opposite side and placed a marker on the opposite side also. The fluoroscopy was brought into the field and re-registered L3, L4 and L5 levels bilaterally for the [employer] robotic system. Once we were satisfied with the x-rays, we merged into the [employer] robotic system and we then brought in the robot and performed bilateral Cordelia approaches. Through those Cordelia approaches, I was able to identify the previous hardware at L3, also at L4 and L5. I removed the locking caps, the rods and the screws, followed by replacing the screws at L4 and L5 bilaterally with screws one diameter larger.   Once those screws were in place had replaced, I proceeded then to use the [employer] robotic system to angelic the entry hole for the L3 screws bilaterally. I started the entry hole with a high-speed drill followed by then using a tap to tap the pedicle and then placed in the premeasured screw. Once all the screws were in place, I used fluoroscopy to verify they were in the correct position on both AP and lateral views. I then proceeded to perform my decompression by using a high-speed drill and making a U-cut laminectomy at L3 and a J cut at L4. I removed the intervening bone with a pituitary and saved it for local autograft bone. I proceeded to detach the ligamentum flavum from the superior leading edge with a curved curette placed into the bony defect and removed the ligamentum flavum with a Kerrison #3 followed by Kerrison #4. I removed the medial overhang of the facet for a facetectomy. I was able to find the traversing nerve root and followed it distally performing a foraminotomy fully decompressing the foramen, the lamina and the facet. Once this was completed, I inspected the nerve root with a Diego ball. I then irrigated the wound with Irrisept, I proceeded then to place my rods and locking caps and final tightened the construct in place from L3 through L5. I then decorticated the posterior elements bilaterally from L3 through L5, placed the allograft bone mixed with local autograft bone mixed with bone marrow aspirate for the posterior fusion from L3 through L5. Once this was completed, I proceeded to obtain final AP and lateral x-rays, saved them to PACS. I then closed the fascia with a zero V-Loc followed by irrigating subcu, closed subcu with a 2-0 Vicryl and the skin with staples. Sterile dressing was applied. The patient was then awakened and transferred to PACU in stable condition. POSTOPERATIVE PLAN:  The patient is going to remain here overnight. We are going to give her SCDs and RON hoses for DVT prophylaxis and Ancef for infection prophylaxis.       OBEY LARA, MD [id number]  D:  01/19/2021 16:43  T:  01/20/2021 1:35  JOB #:  [id number]  CC:  Thaddeus Love MD

## 2021-01-21 VITALS
RESPIRATION RATE: 16 BRPM | BODY MASS INDEX: 46.7 KG/M2 | DIASTOLIC BLOOD PRESSURE: 52 MMHG | OXYGEN SATURATION: 94 % | HEART RATE: 80 BPM | SYSTOLIC BLOOD PRESSURE: 103 MMHG | WEIGHT: 247.14 LBS | TEMPERATURE: 98 F

## 2021-01-21 LAB
GLUCOSE BLD STRIP.AUTO-MCNC: 205 MG/DL (ref 65–100)
GLUCOSE BLD STRIP.AUTO-MCNC: 220 MG/DL (ref 65–100)
HGB BLD-MCNC: 9.3 G/DL (ref 11.5–16)
SERVICE CMNT-IMP: ABNORMAL
SERVICE CMNT-IMP: ABNORMAL

## 2021-01-21 PROCEDURE — 94760 N-INVAS EAR/PLS OXIMETRY 1: CPT

## 2021-01-21 PROCEDURE — 74011636637 HC RX REV CODE- 636/637: Performed by: ORTHOPAEDIC SURGERY

## 2021-01-21 PROCEDURE — 97535 SELF CARE MNGMENT TRAINING: CPT | Performed by: OCCUPATIONAL THERAPIST

## 2021-01-21 PROCEDURE — 97116 GAIT TRAINING THERAPY: CPT

## 2021-01-21 PROCEDURE — 36415 COLL VENOUS BLD VENIPUNCTURE: CPT

## 2021-01-21 PROCEDURE — 74011250637 HC RX REV CODE- 250/637: Performed by: PHYSICIAN ASSISTANT

## 2021-01-21 PROCEDURE — 82962 GLUCOSE BLOOD TEST: CPT

## 2021-01-21 PROCEDURE — 85018 HEMOGLOBIN: CPT

## 2021-01-21 PROCEDURE — 74011250637 HC RX REV CODE- 250/637: Performed by: ORTHOPAEDIC SURGERY

## 2021-01-21 RX ORDER — AMOXICILLIN 250 MG
1 CAPSULE ORAL 2 TIMES DAILY
Qty: 28 TAB | Refills: 0 | Status: SHIPPED | OUTPATIENT
Start: 2021-01-21 | End: 2021-02-04

## 2021-01-21 RX ORDER — NALOXONE HYDROCHLORIDE 4 MG/.1ML
SPRAY NASAL
Qty: 1 EACH | Refills: 0 | Status: SHIPPED | OUTPATIENT
Start: 2021-01-21 | End: 2021-07-08

## 2021-01-21 RX ORDER — PROPRANOLOL HYDROCHLORIDE 20 MG/1
20 TABLET ORAL 3 TIMES DAILY
Qty: 30 TAB | Refills: 0 | Status: SHIPPED
Start: 2021-01-21 | End: 2021-07-08

## 2021-01-21 RX ORDER — ACETAMINOPHEN 500 MG
1000 TABLET ORAL EVERY 6 HOURS
Qty: 112 TAB | Refills: 0 | Status: SHIPPED | OUTPATIENT
Start: 2021-01-21 | End: 2021-02-04

## 2021-01-21 RX ORDER — METAXALONE 800 MG/1
800 TABLET ORAL 3 TIMES DAILY
Status: DISCONTINUED | OUTPATIENT
Start: 2021-01-21 | End: 2021-01-21 | Stop reason: HOSPADM

## 2021-01-21 RX ORDER — OXYCODONE HYDROCHLORIDE 5 MG/1
5-15 TABLET ORAL
Qty: 80 TAB | Refills: 0 | Status: SHIPPED | OUTPATIENT
Start: 2021-01-21 | End: 2021-02-04

## 2021-01-21 RX ORDER — POLYETHYLENE GLYCOL 3350 17 G/17G
17 POWDER, FOR SOLUTION ORAL DAILY
Qty: 14 PACKET | Refills: 0 | Status: SHIPPED | OUTPATIENT
Start: 2021-01-22 | End: 2021-02-05

## 2021-01-21 RX ADMIN — OXYCODONE 15 MG: 5 TABLET ORAL at 01:56

## 2021-01-21 RX ADMIN — THERA TABS 1 TABLET: TAB at 08:38

## 2021-01-21 RX ADMIN — Medication 1 AMPULE: at 09:00

## 2021-01-21 RX ADMIN — FAMOTIDINE 20 MG: 20 TABLET, FILM COATED ORAL at 08:38

## 2021-01-21 RX ADMIN — ACETAMINOPHEN 1000 MG: 500 TABLET ORAL at 00:00

## 2021-01-21 RX ADMIN — Medication 10 ML: at 13:40

## 2021-01-21 RX ADMIN — INSULIN LISPRO 6 UNITS: 100 INJECTION, SOLUTION INTRAVENOUS; SUBCUTANEOUS at 12:21

## 2021-01-21 RX ADMIN — ACETAMINOPHEN 1000 MG: 500 TABLET ORAL at 12:21

## 2021-01-21 RX ADMIN — DOCUSATE SODIUM - SENNOSIDES 1 TABLET: 50; 8.6 TABLET, FILM COATED ORAL at 08:38

## 2021-01-21 RX ADMIN — INSULIN LISPRO 6 UNITS: 100 INJECTION, SOLUTION INTRAVENOUS; SUBCUTANEOUS at 08:39

## 2021-01-21 RX ADMIN — CHOLECALCIFEROL TAB 25 MCG (1000 UNIT) 1 TABLET: 25 TAB at 08:38

## 2021-01-21 RX ADMIN — OXYCODONE 15 MG: 5 TABLET ORAL at 08:37

## 2021-01-21 RX ADMIN — GABAPENTIN 600 MG: 300 CAPSULE ORAL at 08:38

## 2021-01-21 RX ADMIN — PROPRANOLOL HYDROCHLORIDE 20 MG: 10 TABLET ORAL at 08:38

## 2021-01-21 RX ADMIN — LAMOTRIGINE 150 MG: 100 TABLET ORAL at 08:37

## 2021-01-21 RX ADMIN — CLONAZEPAM 0.5 MG: 0.5 TABLET ORAL at 08:38

## 2021-01-21 RX ADMIN — Medication 10 ML: at 06:21

## 2021-01-21 RX ADMIN — OXYCODONE 10 MG: 5 TABLET ORAL at 14:17

## 2021-01-21 RX ADMIN — POLYETHYLENE GLYCOL 3350 17 G: 17 POWDER, FOR SOLUTION ORAL at 08:39

## 2021-01-21 RX ADMIN — ATORVASTATIN CALCIUM 40 MG: 40 TABLET, FILM COATED ORAL at 08:39

## 2021-01-21 RX ADMIN — LISINOPRIL 10 MG: 5 TABLET ORAL at 08:37

## 2021-01-21 RX ADMIN — METAXALONE 800 MG: 800 TABLET ORAL at 10:30

## 2021-01-21 RX ADMIN — METFORMIN HYDROCHLORIDE 1000 MG: 500 TABLET ORAL at 08:39

## 2021-01-21 NOTE — PROGRESS NOTES
Problem: Mobility Impaired (Adult and Pediatric)  Goal: *Acute Goals and Plan of Care (Insert Text)  Description: FUNCTIONAL STATUS PRIOR TO ADMISSION: Patient was independent and active without use of DME.    HOME SUPPORT PRIOR TO ADMISSION: The patient lived with  but did not require assist.    Physical Therapy Goals  Initiated 1/19/2021  1. Patient will move from supine to sit and sit to supine  in bed with independence within 7 day(s). 2.  Patient will transfer from bed to chair and chair to bed with modified independence using the least restrictive device within 7 day(s). 3.  Patient will perform sit to stand with modified independence within 7 day(s). 4.  Patient will ambulate with modified independence for 150 feet with the least restrictive device within 7 day(s). 5.  Patient will ascend/descend 3 stairs with 0 handrail(s) with minimal assistance/contact guard assist within 7 day(s). Outcome: Progressing Towards Goal    PHYSICAL THERAPY TREATMENT  Patient: Bernie Cameron (59 y.o. female)  Date: 1/21/2021  Diagnosis: Chronic right-sided low back pain with right-sided sciatica [M54.41, G89.29]  Spinal stenosis, lumbar region, with neurogenic claudication [M48.062]  Spondylolisthesis of lumbar region [M43.16]  Lumbar pain [M54.5]  S/P spinal fusion [Z98.1]  Bilateral sciatica [M54.31, M54.32]  Chronic bilateral low back pain with bilateral sciatica [M54.42, M54.41, G89.29]  Osteoarthritis of spine with radiculopathy, lumbar region [M47.26]  Spinal stenosis of lumbar region at multiple levels [M48.061] <principal problem not specified>  Procedure(s) (LRB):  ROBOTIC ASSISTED L3-5 POSTERIOR REVISION DECOMPRESION AND FUSION WITH BONE MARROW ASPIRATION FROM ILIAC CREST (N/A) 2 Days Post-Op  Precautions: Spinal, Fall  Chart, physical therapy assessment, plan of care and goals were reviewed. ASSESSMENT  Patient continues with skilled PT services and is progressing towards goals.  Pt was received sitting up in the chair and cleared by nursing to mobilize. She continues to complain of significant pain in the back and RLE despite well timed pain medication. Pt also appear to be fairly drowsy at times. She required some assistance with donning her brace. Stood with RW and ambulated into the jordan. She was able to go ~75ft until she needed to sit and rest from the pain. Rested and then ambulated back to the room. She was left sitting up in the chair.  asked about assistance for pt getting into tall bed at home. Suggested larger exercise step for stability. Current Level of Function Impacting Discharge (mobility/balance): CGA    Other factors to consider for discharge: poor pain control         PLAN :  Patient continues to benefit from skilled intervention to address the above impairments. Continue treatment per established plan of care. to address goals. Recommendation for discharge: (in order for the patient to meet his/her long term goals)  Physical therapy at least 2 days/week in the home AND ensure assist and/or supervision for safety with mobility     This discharge recommendation:  Has been made in collaboration with the attending provider and/or case management    IF patient discharges home will need the following DME: rolling walker       SUBJECTIVE:   Patient stated Garret Franc just hurts so bad on the R side.     OBJECTIVE DATA SUMMARY:   Critical Behavior:  Neurologic State: Alert  Orientation Level: Oriented X4  Cognition: Follows commands  Safety/Judgement: Awareness of environment, Fall prevention, Home safety  Functional Mobility Training:  Bed Mobility:         Session began and ended in sitting           Transfers:  Sit to Stand: Contact guard assistance  Stand to Sit: Contact guard assistance                             Balance:  Sitting: Intact  Standing: Intact; With support  Ambulation/Gait Training:  Distance (ft): 150 Feet (ft)  Assistive Device: Gait belt;Brace/Splint; Walker, rolling  Ambulation - Level of Assistance: Contact guard assistance        Gait Abnormalities: Antalgic;Decreased step clearance;Shuffling gait        Base of Support: Widened     Speed/Stacy: Pace decreased (<100 feet/min); Shuffled  Step Length: Left shortened;Right shortened                   Pain Ratin/10- timed session well with pain medication administration     Activity Tolerance:   Fair    After treatment patient left in no apparent distress:   Sitting in chair and Call bell within reach    COMMUNICATION/COLLABORATION:   The patients plan of care was discussed with: Occupational therapist and Registered nurse.      Bria Espinoza, PT, DPT   Time Calculation: 24 mins

## 2021-01-21 NOTE — PROGRESS NOTES
Problem: Self Care Deficits Care Plan (Adult)  Goal: *Acute Goals and Plan of Care (Insert Text)  Description: FUNCTIONAL STATUS PRIOR TO ADMISSION: Patient was independent and active without use of DME. Was performing all ADLS and IADLS without assist but limited by pain    HOME SUPPORT PRIOR TO ADMISSION: The patient lived with  but did not need assist.    Occupational Therapy Goals:  Initiated 1/20/2021  1. Patient will perform grooming standing with modified independence within 7 days. 2. Patient will perform upper body dressing and lower body dressing with modified independence using AE within 7 days. 3. Patient will perform toileting with modified independence within 7 days. 4. Patient will adhere to 3:3 LS precautions without cues during functional tasks within 7 days. 5. Patient will transfer from toilet with modified independence using the least restrictive device and appropriate durable medical equipment within 7 days  Outcome: Progressing Towards Goal   OCCUPATIONAL THERAPY TREATMENT  Patient: Teresa Cox (89 y.o. female)  Date: 1/21/2021  Diagnosis: Chronic right-sided low back pain with right-sided sciatica [M54.41, G89.29]  Spinal stenosis, lumbar region, with neurogenic claudication [M48.062]  Spondylolisthesis of lumbar region [M43.16]  Lumbar pain [M54.5]  S/P spinal fusion [Z98.1]  Bilateral sciatica [M54.31, M54.32]  Chronic bilateral low back pain with bilateral sciatica [M54.42, M54.41, G89.29]  Osteoarthritis of spine with radiculopathy, lumbar region [M47.26]  Spinal stenosis of lumbar region at multiple levels [M48.061] <principal problem not specified>  Procedure(s) (LRB):  ROBOTIC ASSISTED L3-5 POSTERIOR REVISION DECOMPRESION AND FUSION WITH BONE MARROW ASPIRATION FROM ILIAC CREST (N/A) 2 Days Post-Op  Precautions: Spinal, Fall  Chart, occupational therapy assessment, plan of care, and goals were reviewed.     ASSESSMENT  Patient continues with skilled OT services and is slowy progressing towards goals. Pt was seated at bedside chair and continues to report pain. Pt was requesting to review hip kit this session. She had been mobilizing to and from bathroom and has been toileting with supervision multiple times this morning. She was able to doff socks with dressing stick but with donning of socks with sock aid pt had eyes open but was staring and was not effectively using BUE. Pt had been given pain medicine prior to session. Therapist needed to thread sock over sock aid but pt was able to pull socks up over heels with verbal cues to keep pulling. Pt will need assist at discharge with ADLS and this was discussed with pts   on previous session. Current Level of Function Impacting Discharge (ADLs): Lower Body Dressing Assistance  Socks: Maximum assistance(pt awake but not processing/initiating)    Other factors to consider for discharge: pt has decreased attention ? Due to pain medication         PLAN :  Patient continues to benefit from skilled intervention to address the above impairments. Continue treatment per established plan of care. to address goals. Recommend with staff: assist with mobility    Recommend next OT session: ADLS    Recommendation for discharge: (in order for the patient to meet his/her long term goals)  Occupational therapy at least 2 days/week in the home AND ensure assist and/or supervision for safety with ADLs and mobility    This discharge recommendation:  Has been made in collaboration with the attending provider and/or case management    IF patient discharges home will need the following DME: rolling walker, issued hip kit       SUBJECTIVE:   Patient stated I hurt.     OBJECTIVE DATA SUMMARY:   Cognitive/Behavioral Status:  Neurologic State: (decreased attention)  Orientation Level: Oriented X4  Cognition: Decreased attention/concentration  Perception: Appears intact  Perseveration: No perseveration noted  Safety/Judgement: Fall prevention;Home safety    Functional Mobility and Transfers for ADLs:          Balance:  Sitting: Intact      ADL Intervention:       Lower Body Dressing Assistance  Socks: Maximum assistance(pt awake but not processing/initiating)  Using sock aid to don (total assist placing sock on sock aid) able to pull sock up off sock aid seated. Doffed using dressing stick. Educated to sit to thread clothing over LE and pt verbalized correctly how to use reacher. Informed pt to have reacher with her when she uses the restroom to retrieve clothing from floor due to back precautions. Verbalized understanding. Cognitive Retraining  Safety/Judgement: Fall prevention;Home safety      Pt was able to verbalize all LS precautions and OT reviewed quick draw brace      Pain:  Reported pain at rest, unable to rate, pt was staring into space with eyes open ?due to pain medication    Activity Tolerance:   Fair and requires rest breaks    After treatment patient left in no apparent distress:   Sitting in chair and Call bell within reach    COMMUNICATION/COLLABORATION:   The patients plan of care was discussed with: Physical therapist, Registered nurse, and ortho NP and pt .      Matt Juarez OTR/L  Time Calculation: 12 mins

## 2021-01-21 NOTE — DISCHARGE SUMMARY
Spine Discharge Summary    Patient ID:  Marleni Martinez  [id number]  female  52 y.o.  1970    Admit date: 1/18/2021    Discharge date: 1/21/2021    Admitting Physician: Mona Browning MD     Consulting Physician(s):   Treatment Team: Attending Provider: Zoe Bustamante MD; Utilization Review: Lashanda Blanchard; Utilization Review: Nadja Hilton; Care Manager: Alma Cavazos    Date of Surgery:   1/19/2021 & 1/20/2021    Preoperative Diagnosis:  Chronic right-sided low back pain with right-sided sciatica [M54.41, G89.29]  Spinal stenosis, lumbar region, with neurogenic claudication [M48.062]  Spondylolisthesis of lumbar region [M43.16]  Lumbar pain [M54.5]  S/P spinal fusion [Z98.1]  Bilateral sciatica [M54.31, M54.32]  Chronic bilateral low back pain with bilateral sciatica [M54.42, M54.41, G89.29]  Osteoarthritis of spine with radiculopathy, lumbar region [M47.26]    Postoperative Diagnosis:   Chronic right-sided low back pain with right-sided sciatica [M54.41, G89.29]  Spinal stenosis, lumbar region, with neurogenic claudication [M48.062]  Spondylolisthesis of lumbar region [M43.16]  Lumbar pain [M54.5]  S/P spinal fusion [Z98.1]  Bilateral sciatica [M54.31, M54.32]  Chronic bilateral low back pain with bilateral sciatica [M54.42, M54.41, G89.29]  Osteoarthritis of spine with radiculopathy, lumbar region [M47.26]    Procedure(s):  1/19/2021: L3-4 LATERAL FUSION  1/20/2021: ROBOTIC ASSISTED L3-5 POSTERIOR REVISION DECOMPRESION AND FUSION WITH BONE MARROW ASPIRATION FROM ILIAC CREST     Anesthesia Type:   General     Surgeon: Zoe Bustamante MD                            HPI:  Pt is a 52 y.o. female who has a history of Chronic right-sided low back pain with right-sided sciatica [M54.41, G89.29]  Spinal stenosis, lumbar region, with neurogenic claudication [M48.062]  Spondylolisthesis of lumbar region [M43.16]  Lumbar pain [M54.5]  S/P spinal fusion [Z98.1]  Bilateral sciatica [M54.31, M54.32]  Chronic bilateral low back pain with bilateral sciatica [M54.42, M54.41, G89.29]  Osteoarthritis of spine with radiculopathy, lumbar region [M47.26]  with pain and limitations of activities of daily living who presents at this time for a L3-4 LATERAL FUSION and ROBOTIC ASSISTED L3-5 POSTERIOR REVISION DECOMPRESION AND FUSION WITH BONE MARROW ASPIRATION FROM ILIAC CREST  following the failure of conservative management. PMH:   Past Medical History:   Diagnosis Date    [facility] affective [facility]     Dissociative identity disorder ([facility] [address] [facility]     Hyperlipidemia     Hypertension     Morbid obesity ([facility] [address] [facility]     NIDDM (non-insulin dependent diabetes mellitus)     type 2    Psychiatric disorder     Depression/Anxiety    PTSD (post-traumatic stress disorder)        Body mass index is 46.7 kg/m². : A BMI > 30 is classified as obesity and > 40 is classified as morbid obesity. Medications upon admission :   Prior to Admission Medications   Prescriptions Last Dose Informant Patient Reported? Taking?   atorvastatin (LIPITOR) 40 mg tablet 1/18/2021 at 0900 Self Yes Yes   Sig: Take 40 mg by mouth daily. baclofen (LIORESAL) 10 mg tablet 1/18/2021 at 0900 Self Yes Yes   Sig: Take 10 mg by mouth two (2) times a day. cholecalciferol (Vitamin D3) (1000 Units /25 mcg) tablet 1/16/2021  Yes No   Sig: Take 1,000 Units by mouth daily. clonazePAM (KlonoPIN) 0.5 mg tablet 1/18/2021 at 0900  Yes Yes   Sig: Take 0.5 mg by mouth two (2) times a day. 1 tab at breakfast and 1 tab in the afternoon and 1/2 tablet at bedtime   clonazePAM (KlonoPIN) 0.5 mg tablet 1/17/2021 at Unknown time  Yes Yes   Sig: Take 0.25 mg by mouth nightly. 1 tab at breakfast and 1 tab in the afternoon and 1/2 tablet at bedtime   gabapentin (NEURONTIN) 300 mg capsule 1/18/2021 at 0900 Self Yes Yes   Sig: Take 600 mg by mouth three (3) times daily.    ibuprofen (MOTRIN) 800 mg tablet 1/11/2021 at Unknown time  Yes Yes   Sig: Take 800 mg by mouth three (3) times daily as needed for Pain. insulin nph-regular human rec (HumuLIN 70/30 U-100 KwikPen) 100 unit/mL (70-30) inpn 1/17/2021 at 1800  Yes Yes   Sig: by SubCUTAneous route Before breakfast, lunch, and dinner. Sliding scale   lamoTRIgine (LaMICtal) 150 mg tablet 1/18/2021 at 0900  Yes Yes   Sig: Take 150 mg by mouth two (2) times a day. lisinopriL (PRINIVIL, ZESTRIL) 10 mg tablet 1/16/2021  Yes No   Sig: Take 10 mg by mouth daily. lithium carbonate SR (LITHOBID) 300 mg CR tablet 1/17/2021 at 2100  Yes Yes   Sig: Take 600 mg by mouth nightly. melatonin 5 mg tablet 1/17/2021 at Unknown time  Yes Yes   Sig: Take 5 mg by mouth nightly. metFORMIN (GLUCOPHAGE) 1,000 mg tablet 1/16/2021 at Unknown time Self Yes Yes   Sig: Take 1,000 mg by mouth two (2) times daily (with meals). multivitamin (ONE A DAY) tablet 1/17/2021 at Unknown time Self Yes Yes   Sig: Take 1 Tab by mouth daily. propranoloL (INDERAL) 20 mg tablet 1/18/2021 at 0900  Yes No   Sig: Take 20 mg by mouth three (3) times daily. temazepam (RESTORIL) 30 mg capsule 1/17/2021 at Unknown time  Yes Yes   Sig: Take 30 mg by mouth nightly. traZODone (DESYREL) 100 mg tablet 1/17/2021 at Unknown time  Yes Yes   Sig: Take 200 mg by mouth nightly. 2 tab qhs= 200 mg      Facility-Administered Medications: None        Allergies: Allergies   Allergen Reactions    Darvon [Propoxyphene] Rash    Oxcarbazepine Other (comments)     Hyponatremia (severe)        Hospital Course: The patient underwent surgery. Complications:  None; patient tolerated the procedure well. Was taken to the PACU in stable condition and then transferred to the ortho floor.       Perioperative Antibiotics:  Ancef     Postoperative Pain Management:  Oxycodone & Tylenol     Postoperative transfusions:    Number of units banked PRBCs =   none     Post Op complications: none    Hemoglobin at discharge:    Lab Results   Component Value Date/Time    HGB 9.3 (L) 01/21/2021 03:52 AM    INR 1.0 01/11/2021 03:57 PM       Lateral prineo dressing remained clean, dry and intact. Posterior JAMES dressing changed to Optifoam for discharge. No significant erythema or swelling. Wound appears to be healing without any evidence of infection. Neurovascular exam found to be within normal limits. Physical Therapy started following surgery and participated in bed mobility, transfers and ambulation. Gait:  Gait  Base of Support: Widened  Speed/Stacy: Pace decreased (<100 feet/min), Shuffled  Step Length: Left shortened, Right shortened  Gait Abnormalities: Antalgic, Decreased step clearance, Shuffling gait  Ambulation - Level of Assistance: Contact guard assistance  Distance (ft): 150 Feet (ft)  Assistive Device: Gait belt, Brace/Splint, Walker, rolling  Rail Use: (HHA x 2)  Stairs - Level of Assistance: Minimum assistance, Assist X2  Number of Stairs Trained: 4                   Discharged to: Home with HH. Condition on Discharge:   stable    Discharge instructions:  - Take pain medications as prescribed  - Resume pre hospital diet      - Discharge activity: activity as tolerated  - Ambulate as tolerated  - Lumbar brace when oob  - Avoid bending, lifting and twisting  - Wound Care Keep wound clean and dry. See discharge instruction sheet. -DISCHARGE MEDICATION LIST     Current Discharge Medication List      START taking these medications    Details   acetaminophen (TYLENOL) 500 mg tablet Take 2 Tabs by mouth every six (6) hours for 14 days. Qty: 112 Tab, Refills: 0      oxyCODONE IR (ROXICODONE) 5 mg immediate release tablet Take 1-3 Tabs by mouth every four (4) hours as needed for Pain for up to 14 days. Max Daily Amount: 90 mg.  Qty: 80 Tab, Refills: 0    Associated Diagnoses: Status post lumbar spine operative procedure for decompression of spinal cord      polyethylene glycol (MIRALAX) 17 gram packet Take 1 Packet by mouth daily for 14 days.   Qty: 14 Packet, Refills: 0      senna-docusate (PERICOLACE) 8.6-50 mg per tablet Take 1 Tab by mouth two (2) times a day for 14 days. Qty: 28 Tab, Refills: 0      naloxone (NARCAN) 4 mg/actuation nasal spray Use 1 spray intranasally, then discard. Repeat with new spray every 2 min as needed for opioid overdose symptoms, alternating nostrils. Qty: 1 Each, Refills: 0         CONTINUE these medications which have CHANGED    Details   propranoloL (INDERAL) 20 mg tablet Take 1 Tab by mouth three (3) times daily. Check BP and hold if SBP < 125  Qty: 30 Tab, Refills: 0         CONTINUE these medications which have NOT CHANGED    Details   temazepam (RESTORIL) 30 mg capsule Take 30 mg by mouth nightly. lithium carbonate SR (LITHOBID) 300 mg CR tablet Take 600 mg by mouth nightly. melatonin 5 mg tablet Take 5 mg by mouth nightly. !! clonazePAM (KlonoPIN) 0.5 mg tablet Take 0.5 mg by mouth two (2) times a day. 1 tab at breakfast and 1 tab in the afternoon and 1/2 tablet at bedtime      traZODone (DESYREL) 100 mg tablet Take 200 mg by mouth nightly. 2 tab qhs= 200 mg      lamoTRIgine (LaMICtal) 150 mg tablet Take 150 mg by mouth two (2) times a day. !! clonazePAM (KlonoPIN) 0.5 mg tablet Take 0.25 mg by mouth nightly. 1 tab at breakfast and 1 tab in the afternoon and 1/2 tablet at bedtime      insulin nph-regular human rec (HumuLIN 70/30 U-100 KwikPen) 100 unit/mL (70-30) inpn by SubCUTAneous route Before breakfast, lunch, and dinner. Sliding scale      atorvastatin (LIPITOR) 40 mg tablet Take 40 mg by mouth daily. gabapentin (NEURONTIN) 300 mg capsule Take 600 mg by mouth three (3) times daily. metFORMIN (GLUCOPHAGE) 1,000 mg tablet Take 1,000 mg by mouth two (2) times daily (with meals). multivitamin (ONE A DAY) tablet Take 1 Tab by mouth daily. lisinopriL (PRINIVIL, ZESTRIL) 10 mg tablet Take 10 mg by mouth daily. cholecalciferol (Vitamin D3) (1000 Units /25 mcg) tablet Take 1,000 Units by mouth daily.        !! - Potential duplicate medications found. Please discuss with provider.       STOP taking these medications       ibuprofen (MOTRIN) 800 mg tablet Comments:   Reason for Stopping:         baclofen (LIORESAL) 10 mg tablet Comments:   Reason for Stopping:            per medical continuation form      -Follow up in office in 2 weeks      Signed:  Krys Espinoza, NP  Orthopaedic Nurse Practitioner    1/21/2021  1:48 PM

## 2021-01-21 NOTE — PROGRESS NOTES
Ortho / Neurosurgery NP Note    POD# 2  s/p ROBOTIC ASSISTED L3-5 POSTERIOR REVISION DECOMPRESION AND FUSION WITH BONE MARROW ASPIRATION FROM ILIAC CREST   POD#3 s/p L3-4 LATERAL FUSION  Pt seen with caregiver at bedside. Pt resting in chair. Recently seen by PT and OT. Drowsy, difficult keeping eyes open and maintaining eye contact. States she has not been able to sleep since admission, however nursing reports differently   Significant other concerned about drowsiness as well, reports she drops things because she is falling asleep    Reports expected incisional pain relieved by prn oxycodone. C/o RLE pain radiating down posterior leg to foot and LLE pain radiating down lateral leg to ankle  Also reports mid chest \"soreness\", onset yesterday but today is the first mentioned of this pain. Denies radiation of pain or SOB - area is sensitive to touch, skin intact with no redness/bruising     Tolerating regular diet; states poor intake due to food choices. Willing to try Glucerna. Denies nausea. VSS Afebrile. Room air.      Visit Vitals  BP (!) 130/59 (BP 1 Location: Right arm, BP Patient Position: At rest)   Pulse 90   Temp 98.9 °F (37.2 °C)   Resp 16   Wt 112.1 kg (247 lb 2.2 oz)   LMP 01/13/2021   SpO2 93%   BMI 46.70 kg/m²       Voiding status: voiding w/o difficulty   Output (mL)  Urine Voided: 300 ml (01/21/21 0309)  Last Bowel Movement Date: 01/18/21 (01/20/21 2113)  Unmeasurable Output  Urine Occurrence(s): 1 (01/21/21 0309)  Straight Cath  Straight Cath: Nurse performed cath;Sterile technique used (01/20/21 0330)  Number of Attempts: 1 (01/20/21 0330)  Catheter Size: 14 FR (01/20/21 0330)  Time Catheter Inserted: 0330 (01/20/21 0330)  Time Catheter Removed: 9903 (01/20/21 0330)  Urine: 900 mL (01/20/21 0330)      Labs    Lab Results   Component Value Date/Time    HGB 9.3 (L) 01/21/2021 03:52 AM      Lab Results   Component Value Date/Time    INR 1.0 01/11/2021 03:57 PM      Lab Results Component Value Date/Time    Sodium 133 (L) 01/11/2021 03:57 PM    Potassium 4.8 01/11/2021 03:57 PM    Chloride 100 01/11/2021 03:57 PM    CO2 29 01/11/2021 03:57 PM    Glucose 122 (H) 01/11/2021 03:57 PM    BUN 18 01/11/2021 03:57 PM    Creatinine 0.76 01/11/2021 03:57 PM    Calcium 9.4 01/11/2021 03:57 PM     Recent Glucose Results:   Lab Results   Component Value Date/Time    GLUCPOC 220 (H) 01/21/2021 06:55 AM    GLUCPOC 214 (H) 01/20/2021 09:28 PM    GLUCPOC 176 (H) 01/20/2021 04:38 PM           Body mass index is 46.7 kg/m². : A BMI > 30 is classified as obesity and > 40 is classified as morbid obesity. Lateral prineo dressing c.d.i. Posterior JAMES dressing with scant sanguinous/old breakthrough drainage - change to optifoam   Calves soft and supple; No pain with passive stretch  Sensation and motor intact - BLE PF/DF 4+/5 +EHL. +SLR bilaterally 4/5  SCDs for mechanical DVT proph while in bed     PLAN:  1) Neurovascular assessment q4 hours   2) PT/OT - LSO when oob  3) Pain control - scheduled tylenol, prn oxycodone. Skelaxin added this morning, however seems drowsy with no change in symptoms. Will hold skelaxin for now and try decrease oxycodone use. 4) HTN - BP stable. Continue lisinopril as BP allows. Pt take propanolol for anxiety; instructed to monitor BP and hold if SBP < 125. Pt has auto cuff at home and verbalizes understanding. 5) DM2 - glucose 147-220. A1C 7.5 (1/11). Continue Metformin, SSI while inpatient.    6) Readniess for discharge:     [x] Vital Signs stable    [x] Hgb stable    [x] + Voiding    [x] Wound intact, drainage minimal    [x] Tolerating PO intake     [x] Cleared by PT (OT if applicable)     [x] Stair training completed (if applicable)    [x] Independent / Contact Guard Assist (household distance)     [x] Bed mobility     [x] Car transfers     [x] ADLs    [x] Adequate pain control on oral medication alone     Home today vs tomorrow pending progress with therapy and voiding status. Plans to return home with SO and HH.      Stephanie Pham, NP

## 2021-01-21 NOTE — PROGRESS NOTES
End of Shift Note    Bedside shift change report given to Christy Fuentes RN (oncoming nurse) by Karla Cornelius (offgoing nurse). Report included the following information SBAR, Kardex, OR Summary, Intake/Output, MAR and Recent Results    Shift worked:  Day     Shift summary and any significant changes:     Voiding and pain better controlled with 15 mg oxycodone     Concerns for physician to address:  d/c plan     [employer] phone for oncoming shift:   [phone]       Activity:  Activity Level: Up with Assistance  Number times ambulated in hallways past shift: 0  Number of times OOB to chair past shift: 4    Cardiac:   Cardiac Monitoring: No      Cardiac Rhythm: Normal sinus rhythm    Access:   Current line(s): PIV     Genitourinary:   Urinary status: voiding    Respiratory:   O2 Device: Room air  Chronic home O2 use?: NO  Incentive spirometer at bedside: YES     GI:  Last Bowel Movement Date: 01/18/21  Current diet:  DIET DIABETIC CONSISTENT CARB Regular  Passing flatus: YES  Tolerating current diet: YES       Pain Management:   Patient states pain is manageable on current regimen: YES    Skin:  Larry Score: 19  Interventions: float heels, increase time out of bed and limit briefs    Patient Safety:  Fall Score:  Total Score: 2  Interventions: assistive device (walker, cane, etc), gripper socks and pt to call before getting OOB  High Fall Risk: Yes    Length of Stay:  Expected LOS: 3d 21h  Actual LOS: [address]

## 2021-01-21 NOTE — PROGRESS NOTES
COURTNEY  1) home with home health- [facility]   2) RW- delivered  3)  to provide transportation at d/c  4) clear for d/c from CM standpoint     2:40pm  Medicare pt has received, reviewed, and signed 2nd IM letter informing them of their right to appeal the discharge. Signed copy has been placed on pt bedside chart.    2:00pm  RW delivered to patient's room. .    11:45am 1/21/21  CM contacted [facility] to confirm they received prior auth from [facility] yesterday. [place] checked fax and confirmed they received it last night. [place] to process prior auth and will have RW sent to hospital today. Late entry 4:00pm 1/20/21  CM received phone call from Cassi Doe with [facility] who agreed to send prior auth for RW to [place] [facility].      Lorena Garcia, [address] [facility], [address]

## 2021-01-21 NOTE — PROGRESS NOTES
ORTHO POST OP SPINE PROGRESS NOTE    2021  Admit Date: 2021  Admit Diagnosis: Chronic right-sided low back pain with right-sided sciatica [M54.41, G89.29]  Spinal stenosis, lumbar region, with neurogenic claudication [M48.062]  Spondylolisthesis of lumbar region [M43.16]  Lumbar pain [M54.5]  S/P spinal fusion [Z98.1]  Bilateral sciatica [M54.31, M54.32]  Chronic bilateral low back pain with bilateral sciatica [M54.42, M54.41, G89.29]  Osteoarthritis of spine with radiculopathy, lumbar region [M47.26]  Spinal stenosis of lumbar region at multiple levels [M48.061]  Procedure: Procedure(s):  ROBOTIC ASSISTED L3-5 POSTERIOR REVISION DECOMPRESION AND FUSION WITH BONE MARROW ASPIRATION FROM ILIAC CREST  Post Op day: 2 Days Post-Op    Subjective:     Michael Underwood is a patient who has complaints Of pain in the low back with radiation down the right thigh status post L3-4 lateral and L3-5 posterior fusion. Postop days 3 and 2 respectively. Had been doing fairly well and states worsening back pain after having x-rays taken yesterday. Tolerating p.o., able to void    Review of Systems: Pertinent items are noted in HPI. Objective:     PT/OT:   Distance Ambulated:           Time Ambulated (min):        Ambulation Response: Activity Response: Tolerated well  Assistive Device:              Assistive Device: Fall prevention device    Vital Signs:    Blood pressure (!) 130/59, pulse 90, temperature 98.9 °F (37.2 °C), resp. rate 16, weight 112.1 kg (247 lb 2.2 oz), last menstrual period 2021, SpO2 93 %. Temp (24hrs), Av.4 °F (36.9 °C), Min:98 °F (36.7 °C), Max:98.9 °F (37.2 °C)      No intake/output data recorded.    1901 -  0700  In: 440 [P.O.:440]  Out: 2000 [Urine:2000]    LAB:    Recent Labs     21  0352   HGB 9.3*       Wound/Drain Assessment:  Drain:      Dressing:     Physical Exam:  Neurological: no deficit  Incision clean, dry, and intact  5/5 strength bilateral lower extremities with the exception of the right knee extensors and straight leg raise    Assessment:      Patient Active Problem List   Diagnosis Code    Bipolar disorder, mixed ([facility] [address] F31.60    Hyponatremia E87.1    Spondylisthesis M43.10    S/P lumbar spinal fusion Z98.1    Spinal stenosis of lumbar region at multiple levels M48.061      x-rays reviewed show hardware in good alignment with no evidence of fracture, dislocation or instability  Plan:     Continue PT/OT/Rehab  Discontinue: IV  Consult: PT  and OT    Discharge To:  Home when symptoms controlled and progressing with PT /OT      add Skelaxin

## 2021-01-22 ENCOUNTER — HOME CARE VISIT (OUTPATIENT)
Dept: SCHEDULING | Facility: HOME HEALTH | Age: 51
End: 2021-01-22
Payer: MEDICARE

## 2021-01-22 PROCEDURE — G0151 HHCP-SERV OF PT,EA 15 MIN: HCPCS

## 2021-01-22 PROCEDURE — 400013 HH SOC

## 2021-01-26 ENCOUNTER — HOME CARE VISIT (OUTPATIENT)
Dept: SCHEDULING | Facility: HOME HEALTH | Age: 51
End: 2021-01-26
Payer: MEDICARE

## 2021-01-26 PROCEDURE — G0157 HHC PT ASSISTANT EA 15: HCPCS

## 2021-01-27 ENCOUNTER — HOME CARE VISIT (OUTPATIENT)
Dept: SCHEDULING | Facility: HOME HEALTH | Age: 51
End: 2021-01-27
Payer: MEDICARE

## 2021-01-27 VITALS
OXYGEN SATURATION: 97 % | TEMPERATURE: 98.7 F | RESPIRATION RATE: 18 BRPM | DIASTOLIC BLOOD PRESSURE: 68 MMHG | HEART RATE: 72 BPM | SYSTOLIC BLOOD PRESSURE: 110 MMHG

## 2021-01-27 VITALS
TEMPERATURE: 97 F | SYSTOLIC BLOOD PRESSURE: 114 MMHG | RESPIRATION RATE: 16 BRPM | DIASTOLIC BLOOD PRESSURE: 62 MMHG | OXYGEN SATURATION: 97 % | HEART RATE: 69 BPM

## 2021-01-27 PROCEDURE — G0152 HHCP-SERV OF OT,EA 15 MIN: HCPCS

## 2021-01-28 ENCOUNTER — HOME CARE VISIT (OUTPATIENT)
Dept: SCHEDULING | Facility: HOME HEALTH | Age: 51
End: 2021-01-28
Payer: MEDICARE

## 2021-01-28 VITALS
OXYGEN SATURATION: 96 % | SYSTOLIC BLOOD PRESSURE: 120 MMHG | DIASTOLIC BLOOD PRESSURE: 70 MMHG | RESPIRATION RATE: 18 BRPM | TEMPERATURE: 97 F | HEART RATE: 58 BPM

## 2021-01-28 PROCEDURE — G0157 HHC PT ASSISTANT EA 15: HCPCS

## 2021-02-02 ENCOUNTER — HOME CARE VISIT (OUTPATIENT)
Dept: SCHEDULING | Facility: HOME HEALTH | Age: 51
End: 2021-02-02
Payer: MEDICARE

## 2021-02-02 VITALS
DIASTOLIC BLOOD PRESSURE: 80 MMHG | HEART RATE: 70 BPM | SYSTOLIC BLOOD PRESSURE: 120 MMHG | TEMPERATURE: 97 F | OXYGEN SATURATION: 96 % | RESPIRATION RATE: 16 BRPM

## 2021-02-02 PROCEDURE — G0152 HHCP-SERV OF OT,EA 15 MIN: HCPCS

## 2021-02-03 VITALS
BODY MASS INDEX: 45.24 KG/M2 | OXYGEN SATURATION: 96 % | WEIGHT: 265 LBS | HEART RATE: 68 BPM | DIASTOLIC BLOOD PRESSURE: 68 MMHG | TEMPERATURE: 97.1 F | SYSTOLIC BLOOD PRESSURE: 122 MMHG | RESPIRATION RATE: 16 BRPM | HEIGHT: 64 IN

## 2021-02-04 ENCOUNTER — HOME CARE VISIT (OUTPATIENT)
Dept: SCHEDULING | Facility: HOME HEALTH | Age: 51
End: 2021-02-04
Payer: MEDICARE

## 2021-02-04 VITALS
SYSTOLIC BLOOD PRESSURE: 118 MMHG | HEART RATE: 67 BPM | OXYGEN SATURATION: 98 % | RESPIRATION RATE: 18 BRPM | TEMPERATURE: 97.9 F | DIASTOLIC BLOOD PRESSURE: 85 MMHG

## 2021-02-04 VITALS
RESPIRATION RATE: 16 BRPM | DIASTOLIC BLOOD PRESSURE: 82 MMHG | OXYGEN SATURATION: 97 % | HEART RATE: 56 BPM | TEMPERATURE: 97.9 F | SYSTOLIC BLOOD PRESSURE: 130 MMHG

## 2021-02-04 PROCEDURE — G0157 HHC PT ASSISTANT EA 15: HCPCS

## 2021-02-04 PROCEDURE — G0152 HHCP-SERV OF OT,EA 15 MIN: HCPCS

## 2021-02-06 ENCOUNTER — HOME CARE VISIT (OUTPATIENT)
Dept: SCHEDULING | Facility: HOME HEALTH | Age: 51
End: 2021-02-06
Payer: MEDICARE

## 2021-02-06 VITALS
TEMPERATURE: 98.3 F | OXYGEN SATURATION: 97 % | SYSTOLIC BLOOD PRESSURE: 130 MMHG | RESPIRATION RATE: 18 BRPM | HEART RATE: 75 BPM | DIASTOLIC BLOOD PRESSURE: 85 MMHG

## 2021-02-06 PROCEDURE — G0157 HHC PT ASSISTANT EA 15: HCPCS

## 2021-02-08 ENCOUNTER — HOME CARE VISIT (OUTPATIENT)
Dept: SCHEDULING | Facility: HOME HEALTH | Age: 51
End: 2021-02-08
Payer: MEDICARE

## 2021-02-08 VITALS
DIASTOLIC BLOOD PRESSURE: 86 MMHG | HEART RATE: 72 BPM | TEMPERATURE: 98.2 F | RESPIRATION RATE: 18 BRPM | SYSTOLIC BLOOD PRESSURE: 130 MMHG | OXYGEN SATURATION: 98 %

## 2021-02-08 PROCEDURE — G0152 HHCP-SERV OF OT,EA 15 MIN: HCPCS

## 2021-02-08 PROCEDURE — G0157 HHC PT ASSISTANT EA 15: HCPCS

## 2021-02-09 VITALS
RESPIRATION RATE: 16 BRPM | SYSTOLIC BLOOD PRESSURE: 138 MMHG | HEART RATE: 64 BPM | DIASTOLIC BLOOD PRESSURE: 90 MMHG | TEMPERATURE: 98.6 F | OXYGEN SATURATION: 98 %

## 2021-02-10 ENCOUNTER — HOME CARE VISIT (OUTPATIENT)
Dept: SCHEDULING | Facility: HOME HEALTH | Age: 51
End: 2021-02-10
Payer: MEDICARE

## 2021-02-10 VITALS
DIASTOLIC BLOOD PRESSURE: 80 MMHG | OXYGEN SATURATION: 97 % | SYSTOLIC BLOOD PRESSURE: 125 MMHG | RESPIRATION RATE: 16 BRPM | TEMPERATURE: 97 F | HEART RATE: 64 BPM

## 2021-02-10 PROCEDURE — G0152 HHCP-SERV OF OT,EA 15 MIN: HCPCS

## 2021-02-11 ENCOUNTER — HOME CARE VISIT (OUTPATIENT)
Dept: SCHEDULING | Facility: HOME HEALTH | Age: 51
End: 2021-02-11
Payer: MEDICARE

## 2021-02-11 PROCEDURE — G0151 HHCP-SERV OF PT,EA 15 MIN: HCPCS

## 2021-02-12 NOTE — OP NOTES
[facility]  OPERATIVE REPORT    Name:  LUBA STONE  MR#:  [id number]  :  1970  ACCOUNT #:  [id number]  DATE OF SERVICE:  2021    PREOPERATIVE DIAGNOSES:  1.  Lumbar spinal stenosis with claudication.  2.  Lumbago.  3.  Sciatica.  4.  Lumbar spondylosis.  5.  Previous lumbar fusion L4 through S1.  6.  Lumbar spondylolisthesis L3-4.    POSTOPERATIVE DIAGNOSES:  1.  Lumbar spinal stenosis with claudication.  2.  Lumbago.  3.  Sciatica.  4.  Lumbar spondylosis.  5.  Previous lumbar fusion L4 through S1.  6.  Lumbar spondylolisthesis L3-4.    PROCEDURES PERFORMED:  1.  L3-4 anterior fusion.  2.  L3-4 anterior instrumentation.  3.  L3-4 insertion of interbody biomechanical device.  4.  Use of allograft bone for spine fusion.  5.  Use of bone marrow aspirate from the right anterior-superior iliac spine for spinal fusion augmentation.    SURGEON:  Cash Brown MD    ASSISTANT:  GENE Wilson    ANESTHESIA:  General.    COMPLICATIONS:  None.    SPECIMENS REMOVED:  None.    IMPLANTS:  Globus RISE-L interbody biomechanical device and the Globus [place] plate.  Please note that the plate and the interbody biomechanical device were completely separate and independent implants that are not related to each other.    ESTIMATED BLOOD LOSS:  50 mL.    DRAINS:  Times none.    NARRATIVE OF THE PROCEDURE:  After informed consent was obtained and the operative site was properly marked, the patient was moved back to the operating room and underwent general endotracheal anesthesia.  She was positioned on the lateral decubitus with the right side up using the regular OR bed.  All the bony extremities were well padded and knees were gently bent with pillows.  Fluoroscopy was used to angelic the level of the incision.  We then proceeded to prep and drape in the usual manner.  Time-out was obtained verifying that this is the correct patient, the correct surgery, the correct site as well as  that she had received IV antibiotics within 30 minutes of the incision. In this case, she received IV Ancef. I then proceeded to perform my standard anterior approach for an OLIF from the right-sided to L3-4. I split the abdominal musculature in layers and entered the retroperitoneal space and retracted the peritoneal contents anteriorly, the psoas muscle posteriorly and exposed the disk space at L3-4. Once the disk was exposed, I proceeded to place a self-retaining retractor in that area and I proceeded to use a #15 blade to perform a box annulotomy and then removed the ends with a pituitary. I proceeded to use a Dominguez to detach the cartilaginous endplate both superiorly and inferiorly then used a box shaver to complete the diskectomy. A trial was then used to determine the size for the implant and while the implant was being loaded, I inserted a Jamshidi needle on the right anterior-superior iliac spine through a separate fascial incision and aspirated 20 mL of bone marrow, used that to augment the bone graft used in this procedure. I then proceeded to use the bone marrow to mix it with the bone graft packed in the interbody biomechanical device and inserted at L3-4 for the anterior fusion and insertion of interbody biomechanical device. Once in the proper location, it was deployed to its final height. Once in its final height. It was backfilled with allograft bone. Once that was completed, I proceeded to select the plate and drilled for a screw at L3, another one at L4. Once the screws were in place and locked. I proceeded to obtain final AP and lateral x-rays and proceeded to save them to PACS and proceeded to close the fascia with #1 Vicryl followed by irrigating the subcu, closed subcu with 2-0 Vicryl and the skin with a 3-0 running Monocryl and Dermabond. Sterile dressing was applied. The patient was then awakened and transferred to PACU in stable condition.     POSTOPERATIVE PLAN:  The patient is going to remain here overnight. We are going to give her SCDs and RON hose for DVT prophylaxis and Ancef for infection prophylaxis. We are also going to ambulate with physical therapy and depending on her progress, we will determine what needs to be done surgically tomorrow.       Kamryn Mccrary MD      [id number]  D:  01/18/2021 16:20  T:  01/18/2021 22:02  JOB #:  [id number]  CC:  Kamryn Mccrary MD

## 2021-02-17 ENCOUNTER — HOME CARE VISIT (OUTPATIENT)
Dept: SCHEDULING | Facility: HOME HEALTH | Age: 51
End: 2021-02-17
Payer: MEDICARE

## 2021-02-17 PROCEDURE — G0157 HHC PT ASSISTANT EA 15: HCPCS

## 2021-02-19 VITALS
OXYGEN SATURATION: 93 % | TEMPERATURE: 97.9 F | HEART RATE: 62 BPM | DIASTOLIC BLOOD PRESSURE: 84 MMHG | SYSTOLIC BLOOD PRESSURE: 128 MMHG | RESPIRATION RATE: 18 BRPM

## 2021-02-26 ENCOUNTER — HOME CARE VISIT (OUTPATIENT)
Dept: HOME HEALTH SERVICES | Facility: HOME HEALTH | Age: 51
End: 2021-02-26
Payer: MEDICARE

## 2021-02-26 ENCOUNTER — HOME CARE VISIT (OUTPATIENT)
Dept: SCHEDULING | Facility: HOME HEALTH | Age: 51
End: 2021-02-26
Payer: MEDICARE

## 2021-03-01 ENCOUNTER — HOME CARE VISIT (OUTPATIENT)
Dept: SCHEDULING | Facility: HOME HEALTH | Age: 51
End: 2021-03-01
Payer: MEDICARE

## 2021-03-01 VITALS
TEMPERATURE: 97 F | SYSTOLIC BLOOD PRESSURE: 128 MMHG | OXYGEN SATURATION: 97 % | DIASTOLIC BLOOD PRESSURE: 72 MMHG | HEART RATE: 66 BPM

## 2021-03-01 PROCEDURE — G0151 HHCP-SERV OF PT,EA 15 MIN: HCPCS

## 2021-03-01 PROCEDURE — 400013 HH SOC

## 2021-03-25 ENCOUNTER — OFFICE VISIT (OUTPATIENT)
Dept: NEUROLOGY | Age: 51
End: 2021-03-25
Payer: MEDICARE

## 2021-03-25 VITALS
HEART RATE: 79 BPM | SYSTOLIC BLOOD PRESSURE: 132 MMHG | OXYGEN SATURATION: 98 % | DIASTOLIC BLOOD PRESSURE: 82 MMHG | RESPIRATION RATE: 18 BRPM

## 2021-03-25 DIAGNOSIS — R25.1 TREMOR OF BOTH HANDS: Primary | ICD-10-CM

## 2021-03-25 DIAGNOSIS — T43.505A ADVERSE EFFECT OF NEUROLEPTIC, INITIAL ENCOUNTER: ICD-10-CM

## 2021-03-25 PROCEDURE — G8427 DOCREV CUR MEDS BY ELIG CLIN: HCPCS | Performed by: PSYCHIATRY & NEUROLOGY

## 2021-03-25 PROCEDURE — G9717 DOC PT DX DEP/BP F/U NT REQ: HCPCS | Performed by: PSYCHIATRY & NEUROLOGY

## 2021-03-25 PROCEDURE — G8417 CALC BMI ABV UP PARAM F/U: HCPCS | Performed by: PSYCHIATRY & NEUROLOGY

## 2021-03-25 PROCEDURE — 99204 OFFICE O/P NEW MOD 45 MIN: CPT | Performed by: PSYCHIATRY & NEUROLOGY

## 2021-03-25 NOTE — LETTER
3/25/2021 Patient: Rufina Baumgarten YOB: 1970 Date of Visit: 3/25/2021 Camilo Willingham MD 
[facility] [address] 
[address] [facility] 7 [phone] [address] [employer] Opal Tapia NP 
Karie Frias [address] [facility] [address] [address] [address] Fax: [phone] Dear MD Opal Victoria NP, Thank you for referring Ms. Princess Tucker to [address] [place] [address] for evaluation. My notes for this consultation are attached. If you have questions, please do not hesitate to call me. I look forward to following your patient along with you.  
 
 
Sincerely, 
 
Bernadette Escobar, DO

## 2021-03-25 NOTE — PROGRESS NOTES
NEUROLOGY  NEW PATIENT EVALUATION/CONSULTATION       PATIENT NAME: Peter Harris    MRN: [id number]    REASON FOR CONSULTATION: Tremor    03/25/21      Previous records (physician notes, laboratory reports, and radiology reports) and imaging studies were reviewed and summarized. My recommendations will be communicated back to the patient's physician(s) via electronic medical record and/or by [address] [facility] mail. HISTORY OF PRESENT ILLNESS:  Peter Harris is a 52 y.o. right handed female presenting for evaluation of tremors involving the hands b/l and mouth. Tremors have been present for over 1 year. Tremors previously involved the lower extremities as well which subsequently improved after discontinuation of antipsychotics per patient. Patient cannot recall the names of these medications. Motor:   Tremor-b/l hands, face/mouth, worse with activity  Walking/Falls- no issues  Micrographia-yes  Freezing/Bradykinesia-none  Balance-slight imbalance  Non-motor:   Drooling-none  Dysphagia-none  Hypophonia-none  Anosmia-none  Autonomic:  Urinary-no current issues  Constipation-none  Orthostatic hypotension- none  Sleep- +insomnia  Cognitive/Memory-slight decline  Behavioral/Psychiatric:   Hallucinations-none  Depression-yes and PTSD/bipolar disorder, followed by PCP    The patient has a history of exposure to neuroleptics/antipsychotics x 15 years. Currently on Lithium with h/o toxicity. No exposure to reglan, Phenergan, Compazine, no encephalitis/meningitis, no significant exposure to insecticides/ pesticides/ heavy metals/ carbon monoxide. No fam Hx of tremor, PD, ET    Previous evaluations: PCP started Propranolol for above tremors, uncertain if this was helpful.        PAST MEDICAL HISTORY:  Past Medical History:   Diagnosis Date    Bipolar affective disorder ([facility] [address]     Dissociative identity disorder ([facility] [address]     Hyperlipidemia     Hypertension     Morbid obesity ([facility] [address]     NIDDM (non-insulin dependent diabetes mellitus)     type 2    Psychiatric disorder     Depression/Anxiety    PTSD (post-traumatic stress disorder)        PAST SURGICAL HISTORY:  Past Surgical History:   Procedure Laterality Date    DELIVERY       HX BACK SURGERY      HX BREAST BIOPSY Left     u/s benign biopsy 3 years ago    HX CHOLECYSTECTOMY      HX TONSILLECTOMY      HX TUBAL LIGATION      DC REPAIR ACHILLES TENDON,PRIMARY         FAMILY HISTORY:   Family History   Problem Relation Age of Onset   Floydene Ada Breast Cancer Mother 46    Lung Disease Mother     Heart Disease Mother     Hypertension Mother     Cancer Mother     Breast Cancer Sister 43    Breast Cancer Maternal Grandmother 52         from breast cancer    Breast Cancer Maternal Aunt         6, in 42's, 4  from breast cancer    Breast Cancer Cousin         21 maternal cousins    Heart Disease Father     Diabetes Father     Hypertension Father          SOCIAL HISTORY:  Social History     Socioeconomic History    Marital status:      Spouse name: Not on file    Number of children: Not on file    Years of education: Not on file    Highest education level: Not on file   Tobacco Use    Smoking status: Never Smoker    Smokeless tobacco: Never Used   Substance and Sexual Activity    Alcohol use: No    Drug use: No    Sexual activity: Yes     Partners: Male     Birth control/protection: None         MEDICATIONS:   Current Outpatient Medications   Medication Sig Dispense Refill    propranoloL (INDERAL) 20 mg tablet Take 1 Tab by mouth three (3) times daily. Check BP and hold if SBP < 125 30 Tab 0    naloxone (NARCAN) 4 mg/actuation nasal spray Use 1 spray intranasally, then discard. Repeat with new spray every 2 min as needed for opioid overdose symptoms, alternating nostrils. 1 Each 0    temazepam (RESTORIL) 30 mg capsule Take 30 mg by mouth nightly.  lithium carbonate SR (LITHOBID) 300 mg CR tablet Take 600 mg by mouth nightly.  melatonin 5 mg tablet Take 5 mg by mouth nightly.  clonazePAM (KlonoPIN) 0.5 mg tablet Take 0.5 mg by mouth two (2) times a day. 1 tab at breakfast and 1 tab in the afternoon and 1/2 tablet at bedtime      traZODone (DESYREL) 100 mg tablet Take 200 mg by mouth nightly. 2 tab qhs= 200 mg      lisinopriL (PRINIVIL, ZESTRIL) 10 mg tablet Take 10 mg by mouth daily.  lamoTRIgine (LaMICtal) 150 mg tablet Take 150 mg by mouth two (2) times a day.  clonazePAM (KlonoPIN) 0.5 mg tablet Take 0.25 mg by mouth nightly. 1 tab at breakfast and 1 tab in the afternoon and 1/2 tablet at bedtime      cholecalciferol (Vitamin D3) (1000 Units /25 mcg) tablet Take 1,000 Units by mouth daily.  insulin nph-regular human rec (HumuLIN 70/30 U-100 KwikPen) 100 unit/mL (70-30) inpn by SubCUTAneous route Before breakfast, lunch, and dinner. Sliding scale                 Patient to have accuchecks done before meals and at bedtime with normal sensitivity sliding scale. -150= 2 units  -200=4 units  -250=6 units  -300=8 units  -350= 10 units  -400= 12 units  > 400 call MD      atorvastatin (LIPITOR) 40 mg tablet Take 40 mg by mouth daily.  gabapentin (NEURONTIN) 300 mg capsule Take 600 mg by mouth three (3) times daily.  metFORMIN (GLUCOPHAGE) 1,000 mg tablet Take 1,000 mg by mouth two (2) times daily (with meals).  multivitamin (ONE A DAY) tablet Take 1 Tab by mouth daily. ALLERGIES:  Allergies   Allergen Reactions    Darvon [Propoxyphene] Rash    Oxcarbazepine Other (comments)     Hyponatremia (severe)         REVIEW OF SYSTEMS:  10 point ROS reviewed with patient. Please see scanned document under media. PHYSICAL EXAM:  Vital Signs:   Visit Vitals  /82   Pulse 79   Resp 18   SpO2 98%        General Medical Exam:  General:  Well appearing, comfortable, in no apparent distress. Eyes/ENT: see cranial nerve examination.   Neck: No masses appreciated. Full range of motion without tenderness. Respiratory:  Clear to auscultation, good air entry bilaterally. Cardiac:  Regular rate and rhythm, no murmur. GI:  Soft, non-tender, non-distended abdomen. Bowel sounds normal. No masses, organomegaly. Extremities:  No deformities, edema, or skin discoloration. Skin:  No rashes or lesions. Neurological:  · Mental Status:  Alert and oriented to person, place, and time with fluent speech. · Cranial Nerves:   CNII/III/IV/VI: visual fields full to confrontation, EOMI, PERRL, no ptosis or nystagmus. CN V: Facial sensation intact bilaterally, masseter 5/5   CN VII: Facial muscles symmetric and strong   CN VIII: Hears finger rub well bilaterally, intact vestibular function   CN IX/X: Normal palatal movement   CN XI: Full strength shoulder shrug bilaterally   CN XII: Tongue protrusion full and midline without fasciculation or atrophy  · Motor: Normal tone and muscle bulk with no pronator drift. No atrophy or fasciculations present on examination.   Individual muscle group testing:  Shoulder abduction:   Left:5/5   Right : 5/5    Shoulder adduction:   Left:5/5   Right : 5/5    Elbow flexion:      Left:5/5   Right : 5/5  Elbow extension:    Left:5/5   Right : 5/5   Wrist flexion:    Left:5/5   Right : 5/5  Wrist extension:    Left:5/5   Right : 5/5  Arm pronation:   Left:5/5   Right : 5/5  Arm supination:   Left:5/5   Right : 5/5    Finger flexion:    Left:5/5   Right : 5/5    Finger extension:   Left:5/5   Right : 5/5   Finger abduction:  Left:5/5   Right : 5/5   Finger adduction:   Left:5/5   Right : 5/5  Hip flexion:     Left:5/5   Right : 5/5         Hip extension:   Left:5/5   Right : 5/5    Knee flexion:    Left:5/5   Right : 5/5    Knee extension:   Left:5/5   Right : 5/5    Dorsiflexion:     Left:5/5   Right : 5/5  Plantar flexion:    Left:5/5   Right : 5/5    Foot inversion:    Left:5/5   Right : 5/5   Foot eversion:    Left:5/5   Right : 5/5  Toe flexion:      Left:5/5   Right : 5/5          Toe extension:    Left:5/5   Right : 5/5    · MSRs: No crossed adductors or clonus. RIGHT  LEFT   Brachioradialis 1+ 1+   Biceps 1+ 1+   Triceps 1+ 1+   Knee 1+ 1+   Achilles 1+ 1+        Plantar response Downward Downward          · Sensation: Normal and symmetric perception of pinprick, temperature, light touch, proprioception, and vibration; (-) Romberg. · Coordination: No dysmetria. Normal rapid alternating movements; finger-to-nose and heel-to- shin testing are within normal limits. No bradykinesia, rigidity. +Action predominant tremor b/l UE on FTN. · Gait: Normal native gait    ASSESSMENT:      ICD-10-CM ICD-9-CM    1. Tremor of both hands  R25.1 781.0 TSH 3RD GENERATION      LITHIUM      TSH 3RD GENERATION      LITHIUM   2. Adverse effect of neuroleptic, initial encounter  T43.505A E939.3 TSH 3RD GENERATION      LITHIUM      TSH 3RD GENERATION      LITHIUM   52year old female with a h/o bipolar disorder, PTSD referred due to tremors x 1 year affecting the b/l hands primarily with action on examination. No associated signs/symptoms of Parkinsonism. Tremors were previously involving both lower extremities as well per patient with subsequent improvement after discontinuation of antipsychotics. She remains on Lithium for comorbid mood disorders. Will check levels today along with assessment of thyroid function to exclude medication toxicity or contributing metabolic derangements. If laboratory investigations are unrevealing, I suspect this may be neuroleptic-induced tremor based on her history. PLAN:  · TSH, Lithium       Follow-up and Dispositions    · Return in about 6 weeks (around 5/6/2021). I have discussed the diagnosis with the patient today and the intended plan as seen in the above orders with both the patient as well as referring provider and/or PCP via electronic correspondence.  The patient has received an after-visit summary and questions were answered concerning future plans. I have discussed medication side effects and warnings with the patient as well. Loc Ortiz DO  Staff Neurologist  Diplomate, American Board of Psychiatry & Neurology       CC Referring provider:    Tianna Chawla MD

## 2021-03-26 ENCOUNTER — TELEPHONE (OUTPATIENT)
Dept: NEUROLOGY | Age: 51
End: 2021-03-26

## 2021-03-26 LAB
LITHIUM SERPL-SCNC: 0.6 MMOL/L (ref 0.6–1.2)
TSH SERPL DL<=0.005 MIU/L-ACNC: 5.94 UIU/ML (ref 0.45–4.5)

## 2021-03-26 NOTE — TELEPHONE ENCOUNTER
Spoke with patient, informed her of abnormal TSH lab per . She states she is currently waiting to get in with a new PCP. Informed her when she gets a new one to let the office know and labs can be faxed. They are also available via [employer].

## 2021-04-08 ENCOUNTER — OFFICE VISIT (OUTPATIENT)
Dept: INTERNAL MEDICINE CLINIC | Age: 51
End: 2021-04-08
Payer: MEDICARE

## 2021-04-08 VITALS
DIASTOLIC BLOOD PRESSURE: 65 MMHG | RESPIRATION RATE: 18 BRPM | WEIGHT: 250 LBS | OXYGEN SATURATION: 99 % | BODY MASS INDEX: 42.68 KG/M2 | SYSTOLIC BLOOD PRESSURE: 105 MMHG | HEIGHT: 64 IN | TEMPERATURE: 98 F | HEART RATE: 69 BPM

## 2021-04-08 DIAGNOSIS — J01.90 ACUTE SINUSITIS, RECURRENCE NOT SPECIFIED, UNSPECIFIED LOCATION: ICD-10-CM

## 2021-04-08 DIAGNOSIS — Z79.899 NEW MEDICATION ADDED: ICD-10-CM

## 2021-04-08 DIAGNOSIS — E78.5 HYPERLIPIDEMIA, UNSPECIFIED HYPERLIPIDEMIA TYPE: ICD-10-CM

## 2021-04-08 DIAGNOSIS — Z79.899 ENCOUNTER FOR LONG-TERM (CURRENT) USE OF OTHER MEDICATIONS: ICD-10-CM

## 2021-04-08 DIAGNOSIS — Z79.4 TYPE 2 DIABETES MELLITUS WITH HYPERGLYCEMIA, WITH LONG-TERM CURRENT USE OF INSULIN (HCC): Primary | ICD-10-CM

## 2021-04-08 DIAGNOSIS — D64.9 ANEMIA, UNSPECIFIED TYPE: ICD-10-CM

## 2021-04-08 DIAGNOSIS — E03.9 ACQUIRED HYPOTHYROIDISM: ICD-10-CM

## 2021-04-08 DIAGNOSIS — E11.65 TYPE 2 DIABETES MELLITUS WITH HYPERGLYCEMIA, WITH LONG-TERM CURRENT USE OF INSULIN (HCC): Primary | ICD-10-CM

## 2021-04-08 PROCEDURE — G9717 DOC PT DX DEP/BP F/U NT REQ: HCPCS | Performed by: FAMILY MEDICINE

## 2021-04-08 PROCEDURE — G8417 CALC BMI ABV UP PARAM F/U: HCPCS | Performed by: FAMILY MEDICINE

## 2021-04-08 PROCEDURE — 3044F HG A1C LEVEL LT 7.0%: CPT | Performed by: FAMILY MEDICINE

## 2021-04-08 PROCEDURE — 2022F DILAT RTA XM EVC RTNOPTHY: CPT | Performed by: FAMILY MEDICINE

## 2021-04-08 PROCEDURE — 99204 OFFICE O/P NEW MOD 45 MIN: CPT | Performed by: FAMILY MEDICINE

## 2021-04-08 PROCEDURE — G8427 DOCREV CUR MEDS BY ELIG CLIN: HCPCS | Performed by: FAMILY MEDICINE

## 2021-04-08 RX ORDER — AMOXICILLIN AND CLAVULANATE POTASSIUM 875; 125 MG/1; MG/1
1 TABLET, FILM COATED ORAL 2 TIMES DAILY
Qty: 20 TAB | Refills: 0 | Status: SHIPPED | OUTPATIENT
Start: 2021-04-08 | End: 2021-04-16 | Stop reason: SINTOL

## 2021-04-08 RX ORDER — FLUTICASONE PROPIONATE 50 MCG
SPRAY, SUSPENSION (ML) NASAL
Qty: 1 BOTTLE | Refills: 1 | Status: SHIPPED | OUTPATIENT
Start: 2021-04-08 | End: 2021-06-03 | Stop reason: SDUPTHER

## 2021-04-08 NOTE — PROGRESS NOTES
Donavon Harvey is a 52 y.o. female    Chief Complaint   Patient presents with    New Patient     1. Have you been to the ER, urgent care clinic since your last visit? Hospitalized since your last visit? No    2. Have you seen or consulted any other health care providers outside of the [address] [place] [address] since your last visit? Include any pap smears or colon screening.   No

## 2021-04-08 NOTE — PROGRESS NOTES
SPORTS MEDICINE AND PRIMARY CARE  Ck Pappas. MD Alicia  [address] [address]    Chief Complaint   Patient presents with    New Patient       SUBJECTIVE:    Osvaldo Bales is a 52 y.o. female who presents for follow up of diabetes, hypertension and hyperlipidemia. Diet and Lifestyle: generally follows a low fat low cholesterol diet, generally follows a low sodium diet, sedentary, nonsmoker  LOW SOD DIET  Home BP Monitoring: is not measured at home YES CONTROLLED    Cardiovascular ROS: taking medications as instructed, no medication side effects noted, no TIA's, no chest pain on exertion, no dyspnea on exertion, no swelling of ankles. Diabetic ROS: no hypoglycemia,  no polyuria, no polydipsia, + blurred vision, EYE EXAM DUE, no paresthesias,+ weight changes; no pruritis, no infections or skin issues;  feet and nails are intact    New concerns. 2 WK SINUS INFECT SX    Followed by\"  SADAF  NEUROLOGIST- TARDive dyskinesia  ORTHO  PSYCH     1/ 2021 BACK FUSION    Current Outpatient Medications   Medication Sig Dispense Refill    amoxicillin-clavulanate (AUGMENTIN) 875-125 mg per tablet Take 1 Tab by mouth two (2) times a day for 10 days. INDICATED FOR SINUSITIS 20 Tab 0    fluticasone propionate (FLONASE) 50 mcg/actuation nasal spray 2 SPRAYS PER NOSTRIL QD 1 Bottle 1    propranoloL (INDERAL) 20 mg tablet Take 1 Tab by mouth three (3) times daily. Check BP and hold if SBP < 125 30 Tab 0    temazepam (RESTORIL) 30 mg capsule Take 30 mg by mouth nightly.  lithium carbonate SR (LITHOBID) 300 mg CR tablet Take 600 mg by mouth nightly.  melatonin 5 mg tablet Take 5 mg by mouth nightly.  clonazePAM (KlonoPIN) 0.5 mg tablet Take 0.5 mg by mouth two (2) times a day. 1 tab at breakfast and 1 tab in the afternoon and 1/2 tablet at bedtime      traZODone (DESYREL) 100 mg tablet Take 200 mg by mouth nightly.  2 tab qhs= 200 mg      lisinopriL (PRINIVIL, ZESTRIL) 10 mg tablet Take 10 mg by mouth daily.  lamoTRIgine (LaMICtal) 150 mg tablet Take 150 mg by mouth two (2) times a day.  clonazePAM (KlonoPIN) 0.5 mg tablet Take 0.25 mg by mouth nightly. 1 tab at breakfast and 1 tab in the afternoon and 1/2 tablet at bedtime      cholecalciferol (Vitamin D3) (1000 Units /25 mcg) tablet Take 1,000 Units by mouth daily.  insulin nph-regular human rec (HumuLIN 70/30 U-100 KwikPen) 100 unit/mL (70-30) inpn by SubCUTAneous route Before breakfast, lunch, and dinner. Sliding scale                 Patient to have accuchecks done before meals and at bedtime with normal sensitivity sliding scale. -150= 2 units  -200=4 units  -250=6 units  -300=8 units  -350= 10 units  -400= 12 units  > 400 call MD      atorvastatin (LIPITOR) 40 mg tablet Take 40 mg by mouth daily.  gabapentin (NEURONTIN) 300 mg capsule Take 600 mg by mouth three (3) times daily.  metFORMIN (GLUCOPHAGE) 1,000 mg tablet Take 1,000 mg by mouth two (2) times daily (with meals).  multivitamin (ONE A DAY) tablet Take 1 Tab by mouth daily.  naloxone (NARCAN) 4 mg/actuation nasal spray Use 1 spray intranasally, then discard. Repeat with new spray every 2 min as needed for opioid overdose symptoms, alternating nostrils.  1 Each 0     Past Medical History:   Diagnosis Date    [facility]     Depression     Dissociative identity disorder ([facility]     Hyperlipidemia     Hypertension     Morbid obesity ([facility]     NIDDM (non-insulin dependent diabetes mellitus)     type 2    Psychiatric disorder     Depression/Anxiety    PTSD (post-traumatic stress disorder)     PTSD (post-traumatic stress disorder)      Past Surgical History:   Procedure Laterality Date    DELIVERY       HX BACK SURGERY      HX BREAST BIOPSY Left     u/s benign biopsy 3 years ago    HX CHOLECYSTECTOMY      HX TONSILLECTOMY      HX TUBAL LIGATION      DC REPAIR ACHILLES TENDON,PRIMARY       Allergies   Allergen Reactions    Darvon [Propoxyphene] Rash    Oxcarbazepine Other (comments)     Hyponatremia (severe)       REVIEW OF SYSTEMS:  General: negative for - chills or fever  ENT: +nasal congestion; negative for - headaches, , tinnitus, hearing loss, vision changes, sore throat  Respiratory: negative for - cough, hemoptysis, shortness of breath or wheezing  Cardiovascular : negative for - chest pain, edema, palpitations or shortness of breath  Gastrointestinal: negative for - abdominal pain, blood in stools, heartburn or nausea/vomiting, diarrhea, constipation  Genito-Urinary: no dysuria, trouble voiding, hematuria or erectile dysfunction  Musculoskeletal: negative for - gait disturbance, joint pain, joint stiffness , joint swelling, muscle aches  Neurological: no TIA or stroke symptoms  Hematologic: no bruises, no bleeding, no swollen glands  Integument: no lumps, mole changes, nail changes or rash  Endocrine:unexpected weight gain; no malaise/lethargy         Social History     Socioeconomic History    Marital status:      Spouse name: Not on file    Number of children: Not on file    Years of education: Not on file    Highest education level: Not on file   Tobacco Use    Smoking status: Never Smoker    Smokeless tobacco: Never Used   Substance and Sexual Activity    Alcohol use: No    Drug use: No    Sexual activity: Yes     Partners: Male     Birth control/protection: None     Family History   Problem Relation Age of Onset    Breast Cancer Mother 46    Lung Disease Mother     Heart Disease Mother     Hypertension Mother     Cancer Mother     Breast Cancer Sister 43    Breast Cancer Maternal Grandmother 52         from breast cancer    Breast Cancer Maternal Aunt         6, in 42's, 4  from breast cancer    Breast Cancer Cousin         21 maternal cousins    Heart Disease Father     Diabetes Father     Hypertension Father        OBJECTIVE: Visit Vitals  /65 (BP 1 Location: Right arm, BP Patient Position: Sitting)   Pulse 69   Temp 98 °F (36.7 °C) (Oral)   Resp 18   Ht 5' 4\" (1.626 m)   Wt 250 lb (113.4 kg)   SpO2 99%   BMI 42.91 kg/m²     CONSTITUTIONAL: Appears in mild distress complaining of back pain  EYES: perrla, eom intact  NECK: supple. Thyroid normal  RESPIRATORY: Chest: clear bilaterally  CARDIOVASCULAR: Heart: regular rate and rhythm pulse 1+   MUSCULOSKELETAL: Extremities: no edema,   INTEGUMENT: Warm and dry. . Nails appear normal.  NEUROLOGIC: non-focal exam   MENTAL STATUS: alert and oriented, appropriate affect         ASSESSMENT:   1. Type 2 diabetes mellitus with hyperglycemia, with long-term current use of insulin (HCC) -stable   2. Acute sinusitis, recurrence not specified, unspecified location    3. Acquired hypothyroidism -stable   4. Anemia, unspecified type -stable   5. Hyperlipidemia, unspecified hyperlipidemia type -stable   6. Lumbar fusion history  7. ...... obesity    I have discussed the diagnosis with the patient and the intended plan as seen in the  orders. The patient understands and agrees with the plan. The patient has   received an after visit summary and questions were answered concerning  future plans  Patient labs and/or xrays were reviewed  Past records were reviewed.     PLAN:  .  Orders Placed This Encounter    TSH 3RD GENERATION    T4, FREE    T3 TOTAL    HEMOGLOBIN A1C WITH EAG    CBC WITH AUTOMATED DIFF    IRON    LIPID PANEL    METABOLIC PANEL, COMPREHENSIVE    MICROALBUMIN, UR, RAND W/ MICROALB/CREAT RATIO    FERRITIN    REFERRAL TO OPHTHALMOLOGY    amoxicillin-clavulanate (AUGMENTIN) 875-125 mg per tablet    fluticasone propionate (FLONASE) 50 mcg/actuation nasal spray     Counseled regarding diet, exercise and healthy lifestyle          Advised patient to call back or return to office if symptoms worsen/change/persist.  Discussed expected course/resolution/complications of diagnosis in detail with patient. Medication risks/benefits/costs/interactions/alternatives discussed with patient    Anthony Powell M.D. This note was created using voice recognition software.   Edits have been made but syntax errors might exist.

## 2021-04-09 LAB
CREAT UR-MCNC: 43 MG/DL
EST. AVERAGE GLUCOSE BLD GHB EST-MCNC: 134 MG/DL
HBA1C MFR BLD: 6.3 % (ref 4–5.6)
MICROALBUMIN UR-MCNC: 0.57 MG/DL
MICROALBUMIN/CREAT UR-RTO: 13 MG/G (ref 0–30)

## 2021-04-16 ENCOUNTER — VIRTUAL VISIT (OUTPATIENT)
Dept: INTERNAL MEDICINE CLINIC | Age: 51
End: 2021-04-16
Payer: MEDICARE

## 2021-04-16 DIAGNOSIS — T50.905A ADVERSE EFFECT OF DRUG, INITIAL ENCOUNTER: Primary | ICD-10-CM

## 2021-04-16 DIAGNOSIS — K59.1 FUNCTIONAL DIARRHEA: ICD-10-CM

## 2021-04-16 DIAGNOSIS — J01.90 ACUTE SINUSITIS, RECURRENCE NOT SPECIFIED, UNSPECIFIED LOCATION: ICD-10-CM

## 2021-04-16 PROCEDURE — G8417 CALC BMI ABV UP PARAM F/U: HCPCS | Performed by: FAMILY MEDICINE

## 2021-04-16 PROCEDURE — G8427 DOCREV CUR MEDS BY ELIG CLIN: HCPCS | Performed by: FAMILY MEDICINE

## 2021-04-16 PROCEDURE — 99213 OFFICE O/P EST LOW 20 MIN: CPT | Performed by: FAMILY MEDICINE

## 2021-04-16 PROCEDURE — G9717 DOC PT DX DEP/BP F/U NT REQ: HCPCS | Performed by: FAMILY MEDICINE

## 2021-04-16 RX ORDER — SULFAMETHOXAZOLE AND TRIMETHOPRIM 800; 160 MG/1; MG/1
1 TABLET ORAL 2 TIMES DAILY
Qty: 20 TAB | Refills: 0 | Status: SHIPPED | OUTPATIENT
Start: 2021-04-16 | End: 2021-04-26

## 2021-04-16 RX ORDER — SODIUM CHLORIDE 0.65 %
1 AEROSOL, SPRAY (ML) NASAL AS NEEDED
Qty: 15 ML | Refills: 1 | Status: SHIPPED | OUTPATIENT
Start: 2021-04-16 | End: 2022-06-27

## 2021-04-16 RX ORDER — LORATADINE 10 MG/1
10 TABLET ORAL
Qty: 30 TAB | Refills: 0 | Status: SHIPPED | OUTPATIENT
Start: 2021-04-16

## 2021-04-16 NOTE — PROGRESS NOTES
Nilson Landrum (: 1970) is a 52 y.o. female, established patient, here for evaluation of the following chief complaint(s):   No chief complaint on file. CC: diarrhea    ASSESSMENT/PLAN:  1. Functional diarrhea- resolved, side effect of  augmentin therapy  2. Acute sinusitis, recurrence not specified, unspecified location  -     trimethoprim-sulfamethoxazole (BACTRIM DS, SEPTRA DS) 160-800 mg per tablet; Take 1 Tab by mouth two (2) times a day for 10 days. Indicated for sinus infection, Normal, Disp-20 Tab, R-0  -     loratadine (Claritin) 10 mg tablet; Take 1 Tab by mouth daily as needed for Allergies. Also indicated for respiratory infection, Normal, Disp-30 Tab, R-0  -     sodium chloride (Saline Mist) 0.65 % nasal squeeze bottle; 0.05 mL by Both Nostrils route as needed for Congestion. , Normal, Disp-15 mL, R-1    I have discussed the diagnosis with the patient and the intended plan as seen in the  orders above. The patient understands and agees with the plan. All questions the patient posed were answered. Patient labs and/or xrays were reviewed  Past records were reviewed. Advised patient to call back or return to office if symptoms worsen/change/persist.  Discussed expected course/resolution/complications of diagnosis in detail with patient. Medication risks/benefits/costs/interactions/alternatives discussed with patient    Return as scheduled. SUBJECTIVE/OBJECTIVE:  HPI  61-year-old established patient being treated for acute sinusitis with Augmentin as of 21. Patient developed severe diarrhea on day 2 of Augmentin. Patient stopped medication and pushed fluids. Diarrhea resolved after 12 hours. Sinuses continue to drain. Patient has frontal/ethmoid sinus pressure and bloody mucus coming out of the nose. She denies fever chills nausea or vomiting. Flonase did not give her a lot of relief. She is allergic to Wellbutrin but denies other drug allergies.     Review of Systems Per hpi    Patient-Reported Vitals 1/12/2021   Patient-Reported Weight 250   Patient-Reported Height 5'1   Patient-Reported Pulse ? Patient-Reported Temperature 98.1   Patient-Reported Systolic  591   Patient-Reported Diastolic 56   Patient-Reported LMP 12/3/21       Physical Exam    [INSTRUCTIONS:  \"[x]\" Indicates a positive item  \"[]\" Indicates a negative item  -- DELETE ALL ITEMS NOT EXAMINED]    Constitutional: [x] Appears well-developed and well-nourished [x] No apparent distress      [] Abnormal -     Mental status: [x] Alert and awake  [x] Oriented to person/place/time [x] Able to follow commands    [] Abnormal -     Eyes:   EOM    [x]  Normal    [] Abnormal -   Sclera  [x]  Normal    [] Abnormal -          Discharge [x]  None visible   [] Abnormal -     HENT: [x] Normocephalic, atraumatic  [] Abnormal -   [x] Mouth/Throat: Mucous membranes are moist    External Ears [x] Normal  [] Abnormal -    Neck: [x] No visualized mass [] Abnormal -     Pulmonary/Chest: [x] Respiratory effort normal   [x] No visualized signs of difficulty breathing or respiratory distress        [] Abnormal -      Musculoskeletal:   [] Normal gait with no signs of ataxia         [x] Normal range of motion of neck        [] Abnormal -     Neurological:        [x] No Facial Asymmetry (Cranial nerve 7 motor function) (limited exam due to video visit)          [x] No gaze palsy        [] Abnormal -          Skin:        [x] No significant exanthematous lesions or discoloration noted on facial skin         [] Abnormal -            Psychiatric:       [x] Normal Affect [] Abnormal -        [x] No Hallucinations    Other pertinent observable physical exam findings:-            Nilson Fernández, was evaluated through a synchronous (real-time) audio-video encounter. The patient (or guardian if applicable) is aware that this is a billable service. Verbal consent to proceed has been obtained within the past 12 months.  The visit was conducted pursuant to the emergency declaration under the [facility] authority and the [employer] General Act. Patient identification was verified, and a caregiver was present when appropriate. The patient was located in a state where the provider was credentialed to provide care. An electronic signature was used to authenticate this note.   -- Osbaldo Parker MD

## 2021-07-08 ENCOUNTER — OFFICE VISIT (OUTPATIENT)
Dept: INTERNAL MEDICINE CLINIC | Age: 51
End: 2021-07-08
Payer: MEDICARE

## 2021-07-08 VITALS
SYSTOLIC BLOOD PRESSURE: 103 MMHG | WEIGHT: 256.7 LBS | OXYGEN SATURATION: 97 % | HEART RATE: 73 BPM | RESPIRATION RATE: 18 BRPM | HEIGHT: 64 IN | TEMPERATURE: 98.3 F | BODY MASS INDEX: 43.83 KG/M2 | DIASTOLIC BLOOD PRESSURE: 69 MMHG

## 2021-07-08 DIAGNOSIS — E78.5 HYPERLIPIDEMIA, UNSPECIFIED HYPERLIPIDEMIA TYPE: ICD-10-CM

## 2021-07-08 DIAGNOSIS — D24.2 FIBROADENOMA OF LEFT BREAST: ICD-10-CM

## 2021-07-08 DIAGNOSIS — Z12.31 ENCOUNTER FOR SCREENING MAMMOGRAM FOR MALIGNANT NEOPLASM OF BREAST: ICD-10-CM

## 2021-07-08 DIAGNOSIS — E03.8 SUBCLINICAL HYPOTHYROIDISM: ICD-10-CM

## 2021-07-08 DIAGNOSIS — E11.9 TYPE 2 DIABETES MELLITUS WITHOUT COMPLICATION, WITHOUT LONG-TERM CURRENT USE OF INSULIN (HCC): Primary | ICD-10-CM

## 2021-07-08 DIAGNOSIS — E66.01 OBESITY, CLASS III, BMI 40-49.9 (MORBID OBESITY) (HCC): ICD-10-CM

## 2021-07-08 DIAGNOSIS — I10 ESSENTIAL HYPERTENSION, BENIGN: ICD-10-CM

## 2021-07-08 PROCEDURE — G8427 DOCREV CUR MEDS BY ELIG CLIN: HCPCS | Performed by: FAMILY MEDICINE

## 2021-07-08 PROCEDURE — 99213 OFFICE O/P EST LOW 20 MIN: CPT | Performed by: FAMILY MEDICINE

## 2021-07-08 PROCEDURE — 3044F HG A1C LEVEL LT 7.0%: CPT | Performed by: FAMILY MEDICINE

## 2021-07-08 PROCEDURE — G9717 DOC PT DX DEP/BP F/U NT REQ: HCPCS | Performed by: FAMILY MEDICINE

## 2021-07-08 PROCEDURE — 2022F DILAT RTA XM EVC RTNOPTHY: CPT | Performed by: FAMILY MEDICINE

## 2021-07-08 PROCEDURE — G8417 CALC BMI ABV UP PARAM F/U: HCPCS | Performed by: FAMILY MEDICINE

## 2021-07-08 RX ORDER — INSULIN HUMAN 100 [IU]/ML
INJECTION, SUSPENSION SUBCUTANEOUS
Status: CANCELLED | OUTPATIENT
Start: 2021-07-08

## 2021-07-08 NOTE — PROGRESS NOTES
SPORTS MEDICINE AND PRIMARY CARE  Kamala Vargas MD  [address] [address]    Chief Complaint   Patient presents with    Diabetes    Hypertension       SUBJECTIVE:    Jamshid Coleman is a 54 y.o. female who presents for follow up of diabetes, hypertension and hyperlipidemia. Diet and Lifestyle: generally follows a low fat low cholesterol diet, generally follows a low sodium diet, sedentary, nonsmoker  Home BP Monitoring: is not measured at home    Cardiovascular ROS: taking medications as instructed, no medication side effects noted, no TIA's, no chest pain on exertion, no dyspnea on exertion, no swelling of ankles, no orthostatic dizziness or lightheadedness, no orthopnea or paroxysmal nocturnal dyspnea, no palpitations, no intermittent claudication symptoms, no muscle aches or pain. Diabetic ROS: no hypoglycemia,  no polyuria, no polydipsia, no blurred vision, no paresthesias, no weight changes or pruritis, no infections or skin issues;  feet and nails are intact    New concerns: . Bipolar history- stable  Mammogram is due. Fibroadenoma history. Current Outpatient Medications   Medication Sig Dispense Refill    fluticasone propionate (FLONASE) 50 mcg/actuation nasal spray 2 SPRAYS PER NOSTRIL QD 1 Bottle 2    loratadine (Claritin) 10 mg tablet Take 1 Tab by mouth daily as needed for Allergies. Also indicated for respiratory infection 30 Tab 0    sodium chloride (Saline Mist) 0.65 % nasal squeeze bottle 0.05 mL by Both Nostrils route as needed for Congestion. 15 mL 1    temazepam (RESTORIL) 30 mg capsule Take 30 mg by mouth nightly.  lithium carbonate SR (LITHOBID) 300 mg CR tablet Take 600 mg by mouth nightly.  melatonin 5 mg tablet Take 5 mg by mouth nightly.  lisinopriL (PRINIVIL, ZESTRIL) 10 mg tablet Take 10 mg by mouth daily.  lamoTRIgine (LaMICtal) 150 mg tablet Take 150 mg by mouth two (2) times a day.       cholecalciferol (Vitamin D3) (1000 Units /25 mcg) tablet Take 1,000 Units by mouth daily.  insulin nph-regular human rec (HumuLIN 70/30 U-100 KwikPen) 100 unit/mL (70-30) inpn by SubCUTAneous route Before breakfast, lunch, and dinner. Sliding scale                 Patient to have accuchecks done before meals and at bedtime with normal sensitivity sliding scale. -150= 2 units  -200=4 units  -250=6 units  -300=8 units  -350= 10 units  -400= 12 units  > 400 call MD      atorvastatin (LIPITOR) 40 mg tablet Take 40 mg by mouth daily.  gabapentin (NEURONTIN) 300 mg capsule Take 600 mg by mouth three (3) times daily.  metFORMIN (GLUCOPHAGE) 1,000 mg tablet Take 1,000 mg by mouth two (2) times daily (with meals).  multivitamin (ONE A DAY) tablet Take 1 Tab by mouth daily.        Past Medical History:   Diagnosis Date    [facility] [address]     Depression     [facility]     Hyperlipidemia     Hypertension     Morbid obesity ([facility]     NIDDM (non-insulin dependent diabetes mellitus)     type 2    Psychiatric disorder     Depression/Anxiety    PTSD (post-traumatic stress disorder)     PTSD (post-traumatic stress disorder)      Past Surgical History:   Procedure Laterality Date    DELIVERY       HX BACK SURGERY      HX BREAST BIOPSY Left     u/s benign biopsy 3 years ago    HX CHOLECYSTECTOMY      HX TONSILLECTOMY      HX TUBAL LIGATION      [place] REPAIR ACHILLES TENDON,PRIMARY       Allergies   Allergen Reactions    Darvon [Propoxyphene] Rash    Oxcarbazepine Other (comments)     Hyponatremia (severe)         Social History     Socioeconomic History    Marital status:      Spouse name: Not on file    Number of children: Not on file    Years of education: Not on file    Highest education level: Not on file   Tobacco Use    Smoking status: Never Smoker    Smokeless tobacco: Never Used   Vaping Use    Vaping Use: Never used   Substance and Sexual Activity    Alcohol use: No    Drug use: No    Sexual activity: Yes     Partners: Male     Birth control/protection: None     Social Determinants of Health     Financial Resource Strain:     Difficulty of Paying Living Expenses:    Food Insecurity:     Worried About Running Out of Food in the Last Year:     [address] in the Last Year:    Transportation Needs:     Lack of Transportation (Medical):  Lack of Transportation (Non-Medical):    Physical Activity:     Days of Exercise per Week:     Minutes of Exercise per Session:    Stress:     Feeling of Stress :    Social Connections:     Frequency of Communication with Friends and Family:     Frequency of Social Gatherings with Friends and Family:     Attends Christianity Services:     Active Member of Clubs or Organizations:     Attends Club or Organization Meetings:     Marital Status:      Family History   Problem Relation Age of Onset    Breast Cancer Mother 46    Lung Disease Mother     Heart Disease Mother     Hypertension Mother     Cancer Mother     Breast Cancer Sister 43    Breast Cancer Maternal Grandmother 52         from breast cancer    Breast Cancer Maternal Aunt         6, in 42's, 4  from breast cancer    Breast Cancer Cousin         21 maternal cousins    Heart Disease Father     Diabetes Father     Hypertension Father        OBJECTIVE:     Visit Vitals  /69 (BP 1 Location: Left upper arm, BP Patient Position: Sitting)   Pulse 73   Temp 98.3 °F (36.8 °C) (Oral)   Resp 18   Ht 5' 4\" (1.626 m)   Wt 256 lb 11.2 oz (116.4 kg)   SpO2 97%   BMI 44.06 kg/m²       Appearance: alert, well appearing, and in no distress.   General exam: CVS exam BP noted to be well controlled today in office, S1, S2 normal, no gallop, no murmur, chest clear, no JVD, no HSM, no edema, diabetic exam feet: warm, good capillary refill and normal monofilament exam, peripheral vascular exam both carotids normal upstroke without bruits, radial pulses normal, pedal pulses normal both DP's and PT's, neurological exam alert, oriented, normal speech, no focal findings or movement disorder noted. ASSESSMENT:   1. Type 2 diabetes mellitus without complication, without long-term current use of insulin ([facility]) -A1c at goal   2. Essential hypertension, benign -controlled with current management   3. Hyperlipidemia, unspecified hyperlipidemia type -stable statin management   4. Fibroadenoma history involving left breast    5. Obesity, Class III, BMI 40-49.9 (morbid obesity) ([facility])          PLAN:  .  Orders Placed This Encounter    KATT 3D ALEXANDER W MAMMO BI SCREENING INCL CAD    HEMOGLOBIN A1C WITH EAG   Patient will continue current medication management and lifestyle modification    Follow-up and Dispositions    · Return in about 3 months (around 10/8/2021). I have discussed the diagnosis with the patient and the intended plan as seen in the  orders above. The patient understands and agrees with the plan. The patient has   received an after visit summary. Questions were answered concerning  future plans  Patient labs and/or xrays were reviewed as available. Past records were reviewed as available. Counseled regarding diet, exercise and healthy lifestyle          Advised patient to  call back or return to office if symptoms develop/worsen/change/persist.  Discussed expected course/resolution/complications of diagnosis in detail with patient. Medication risks/benefits/costs/interactions/alternativesconsidered    Signed,  Leticia Richards M.D. This note was created using voice recognition software.   Edits have been made but syntax errors might exist.

## 2021-07-08 NOTE — PROGRESS NOTES
Alden Donald is a 54 y.o. female    Chief Complaint   Patient presents with    Diabetes    Hypertension     1. Have you been to the ER, urgent care clinic since your last visit? Hospitalized since your last visit? No    2. Have you seen or consulted any other health care providers outside of the [address] [place] [address] since your last visit? Include any pap smears or colon screening.   No

## 2021-07-09 LAB
ALBUMIN SERPL-MCNC: 4.6 G/DL (ref 3.8–4.8)
ALBUMIN/GLOB SERPL: 1.8 {RATIO} (ref 1.2–2.2)
ALP SERPL-CCNC: 103 IU/L (ref 48–121)
ALT SERPL-CCNC: 18 IU/L (ref 0–32)
AST SERPL-CCNC: 16 IU/L (ref 0–40)
BASOPHILS # BLD AUTO: 0.1 X10E3/UL (ref 0–0.2)
BASOPHILS NFR BLD AUTO: 1 %
BILIRUB SERPL-MCNC: <0.2 MG/DL (ref 0–1.2)
BUN SERPL-MCNC: 16 MG/DL (ref 6–24)
BUN/CREAT SERPL: 21 (ref 9–23)
CALCIUM SERPL-MCNC: 9.7 MG/DL (ref 8.7–10.2)
CHLORIDE SERPL-SCNC: 99 MMOL/L (ref 96–106)
CHOLEST SERPL-MCNC: 143 MG/DL (ref 100–199)
CO2 SERPL-SCNC: 22 MMOL/L (ref 20–29)
CREAT SERPL-MCNC: 0.78 MG/DL (ref 0.57–1)
EOSINOPHIL # BLD AUTO: 0.3 X10E3/UL (ref 0–0.4)
EOSINOPHIL NFR BLD AUTO: 3 %
ERYTHROCYTE [DISTWIDTH] IN BLOOD BY AUTOMATED COUNT: 13.4 % (ref 11.7–15.4)
EST. AVERAGE GLUCOSE BLD GHB EST-MCNC: 123 MG/DL
FERRITIN SERPL-MCNC: 81 NG/ML (ref 15–150)
GLOBULIN SER CALC-MCNC: 2.6 G/DL (ref 1.5–4.5)
GLUCOSE SERPL-MCNC: 109 MG/DL (ref 65–99)
HBA1C MFR BLD: 5.9 % (ref 4.8–5.6)
HCT VFR BLD AUTO: 38.5 % (ref 34–46.6)
HDLC SERPL-MCNC: 42 MG/DL
HGB BLD-MCNC: 12.6 G/DL (ref 11.1–15.9)
IMM GRANULOCYTES # BLD AUTO: 0 X10E3/UL (ref 0–0.1)
IMM GRANULOCYTES NFR BLD AUTO: 0 %
IRON SERPL-MCNC: 39 UG/DL (ref 27–159)
LDLC SERPL CALC-MCNC: 64 MG/DL (ref 0–99)
LYMPHOCYTES # BLD AUTO: 2.7 X10E3/UL (ref 0.7–3.1)
LYMPHOCYTES NFR BLD AUTO: 27 %
MCH RBC QN AUTO: 27.5 PG (ref 26.6–33)
MCHC RBC AUTO-ENTMCNC: 32.7 G/DL (ref 31.5–35.7)
MCV RBC AUTO: 84 FL (ref 79–97)
MONOCYTES # BLD AUTO: 0.7 X10E3/UL (ref 0.1–0.9)
MONOCYTES NFR BLD AUTO: 7 %
NEUTROPHILS # BLD AUTO: 6.1 X10E3/UL (ref 1.4–7)
NEUTROPHILS NFR BLD AUTO: 62 %
PLATELET # BLD AUTO: 315 X10E3/UL (ref 150–450)
POTASSIUM SERPL-SCNC: 4.7 MMOL/L (ref 3.5–5.2)
PROT SERPL-MCNC: 7.2 G/DL (ref 6–8.5)
RBC # BLD AUTO: 4.58 X10E6/UL (ref 3.77–5.28)
SODIUM SERPL-SCNC: 136 MMOL/L (ref 134–144)
T3 SERPL-MCNC: 117 NG/DL (ref 71–180)
T4 FREE SERPL-MCNC: 1.05 NG/DL (ref 0.82–1.77)
TRIGL SERPL-MCNC: 227 MG/DL (ref 0–149)
TSH SERPL DL<=0.005 MIU/L-ACNC: 5.12 UIU/ML (ref 0.45–4.5)
VLDLC SERPL CALC-MCNC: 37 MG/DL (ref 5–40)
WBC # BLD AUTO: 9.9 X10E3/UL (ref 3.4–10.8)

## 2021-07-13 ENCOUNTER — TRANSCRIBE ORDER (OUTPATIENT)
Dept: SCHEDULING | Age: 51
End: 2021-07-13

## 2021-07-13 DIAGNOSIS — Z12.31 SCREENING MAMMOGRAM FOR HIGH-RISK PATIENT: Primary | ICD-10-CM

## 2021-07-28 RX ORDER — INSULIN HUMAN 100 [IU]/ML
INJECTION, SUSPENSION SUBCUTANEOUS
OUTPATIENT
Start: 2021-07-28

## 2021-07-29 RX ORDER — INSULIN GLARGINE 100 [IU]/ML
INJECTION, SOLUTION SUBCUTANEOUS
Qty: 5 PEN | Refills: 0 | Status: SHIPPED | OUTPATIENT
Start: 2021-07-29 | End: 2021-11-03 | Stop reason: ALTCHOICE

## 2021-08-17 ENCOUNTER — HOSPITAL ENCOUNTER (OUTPATIENT)
Dept: MAMMOGRAPHY | Age: 51
Discharge: HOME | End: 2021-08-17
Attending: FAMILY MEDICINE
Payer: MEDICARE

## 2021-08-17 DIAGNOSIS — Z12.31 ENCOUNTER FOR SCREENING MAMMOGRAM FOR MALIGNANT NEOPLASM OF BREAST: ICD-10-CM

## 2021-08-17 PROCEDURE — 77063 BREAST TOMOSYNTHESIS BI: CPT

## 2021-08-26 ENCOUNTER — TELEPHONE (OUTPATIENT)
Dept: INTERNAL MEDICINE CLINIC | Age: 51
End: 2021-08-26

## 2021-08-26 NOTE — TELEPHONE ENCOUNTER
Bre Rodriguez from [facility] requesting a call back to discuss Ardia Din Thyroid level.     Please Call [phone]

## 2021-09-02 RX ORDER — LEVOTHYROXINE SODIUM 25 UG/1
25 TABLET ORAL
Qty: 90 TABLET | Refills: 0 | Status: SHIPPED | OUTPATIENT
Start: 2021-09-02 | End: 2021-11-02 | Stop reason: SDUPTHER

## 2021-09-07 NOTE — TELEPHONE ENCOUNTER
Contacted pt. She stated that she picked up the levothyroxine a few days ago and started taking it. Made her aware that she needed to come back in about 4 weeks to have her labs drawn to recheck her thyroid after starting the medication. Informed her that she could come in Monday through Friday between 9a-3p. The patient stated understanding.

## 2021-09-07 NOTE — TELEPHONE ENCOUNTER
----- Message from Dawit Somers MD sent at 9/2/2021  9:56 AM EDT -----  Regarding: subclinical hypothyroidism  Synthroid authorized. Have pt come in for lab in 4 wks (walk in, lab order in)  ----- Message -----  From: Jasson Gan  Sent: 8/31/2021   3:57 PM EDT  To: MD Dr. Boo Holley,    This is just a reminder message about our conversation this morning. Wayne from [facility] called and wanted to request you start her on levothyroxine as her last level was 5.1 and for patients with psychiatric issues optimal level is 2.5. She stated that she would really like to try and see if regulating her thyroid would help with her depression and mood swings before adding another psychiatric medication.      Thanks  Lorinda Rinne LPN

## 2021-11-02 ENCOUNTER — OFFICE VISIT (OUTPATIENT)
Dept: INTERNAL MEDICINE CLINIC | Age: 51
End: 2021-11-02
Payer: MEDICARE

## 2021-11-02 VITALS
WEIGHT: 260.8 LBS | OXYGEN SATURATION: 97 % | HEART RATE: 72 BPM | RESPIRATION RATE: 18 BRPM | HEIGHT: 64 IN | SYSTOLIC BLOOD PRESSURE: 110 MMHG | TEMPERATURE: 99 F | BODY MASS INDEX: 44.53 KG/M2 | DIASTOLIC BLOOD PRESSURE: 72 MMHG

## 2021-11-02 DIAGNOSIS — Z23 NEEDS FLU SHOT: ICD-10-CM

## 2021-11-02 DIAGNOSIS — R73.03 PREDIABETES: ICD-10-CM

## 2021-11-02 DIAGNOSIS — E03.8 SUBCLINICAL HYPOTHYROIDISM: ICD-10-CM

## 2021-11-02 DIAGNOSIS — E11.9 TYPE 2 DIABETES MELLITUS WITHOUT COMPLICATION, WITHOUT LONG-TERM CURRENT USE OF INSULIN (HCC): ICD-10-CM

## 2021-11-02 DIAGNOSIS — I10 ESSENTIAL HYPERTENSION, BENIGN: ICD-10-CM

## 2021-11-02 DIAGNOSIS — E78.5 HYPERLIPIDEMIA, UNSPECIFIED HYPERLIPIDEMIA TYPE: ICD-10-CM

## 2021-11-02 DIAGNOSIS — E66.01 CLASS 3 SEVERE OBESITY DUE TO EXCESS CALORIES WITH SERIOUS COMORBIDITY AND BODY MASS INDEX (BMI) OF 40.0 TO 44.9 IN ADULT (HCC): ICD-10-CM

## 2021-11-02 DIAGNOSIS — Z00.00 MEDICARE ANNUAL WELLNESS VISIT, SUBSEQUENT: Primary | ICD-10-CM

## 2021-11-02 PROCEDURE — 3044F HG A1C LEVEL LT 7.0%: CPT | Performed by: FAMILY MEDICINE

## 2021-11-02 PROCEDURE — G9717 DOC PT DX DEP/BP F/U NT REQ: HCPCS | Performed by: FAMILY MEDICINE

## 2021-11-02 PROCEDURE — G0008 ADMIN INFLUENZA VIRUS VAC: HCPCS | Performed by: FAMILY MEDICINE

## 2021-11-02 PROCEDURE — G0439 PPPS, SUBSEQ VISIT: HCPCS | Performed by: FAMILY MEDICINE

## 2021-11-02 PROCEDURE — G8417 CALC BMI ABV UP PARAM F/U: HCPCS | Performed by: FAMILY MEDICINE

## 2021-11-02 PROCEDURE — 90686 IIV4 VACC NO PRSV 0.5 ML IM: CPT | Performed by: FAMILY MEDICINE

## 2021-11-02 PROCEDURE — 99213 OFFICE O/P EST LOW 20 MIN: CPT | Performed by: FAMILY MEDICINE

## 2021-11-02 PROCEDURE — 2022F DILAT RTA XM EVC RTNOPTHY: CPT | Performed by: FAMILY MEDICINE

## 2021-11-02 PROCEDURE — G8427 DOCREV CUR MEDS BY ELIG CLIN: HCPCS | Performed by: FAMILY MEDICINE

## 2021-11-02 RX ORDER — SEMAGLUTIDE 1.34 MG/ML
INJECTION, SOLUTION SUBCUTANEOUS
COMMUNITY
Start: 2021-11-01

## 2021-11-02 RX ORDER — LEVOTHYROXINE SODIUM 25 UG/1
25 TABLET ORAL
Qty: 90 TABLET | Refills: 0 | Status: SHIPPED | OUTPATIENT
Start: 2021-11-02 | End: 2022-02-07 | Stop reason: SDUPTHER

## 2021-11-02 NOTE — PROGRESS NOTES
SPORTS MEDICINE AND PRIMARY CARE  Katlyn Mccarty. MD Alicia  [address] [address]    Chief Complaint   Patient presents with    Annual Wellness Visit    Diabetes       SUBJECTIVE:    Michael Underwood is a 54 y.o. female for annual wellness visit, subclinical hypothyroidism and diabetes follow up. Thyroid replacement hormone begun in interim. Pt reports more energy and a slowing of weight gain. T2DM  Stable management- ozempic,70/30  insulin and metformin  Diabetic ROS: no hypoglycemia,  no polyuria, no polydipsia, no blurred vision, no paresthesias, no weight changes or pruritis, no infections or skin issues;  feet and nails are intact    HTN  No chest pain, dyspnea, palpitations, cough  Stable ace inhibitor use    HLD  Stable statin use  No myalgias    Current Outpatient Medications   Medication Sig Dispense Refill    Ozempic 0.25 mg or 0.5 mg/dose (2 mg/1.5 ml) subq pen       levothyroxine (SYNTHROID) 25 mcg tablet Take 1 Tablet by mouth Daily (before breakfast). Do not eat 1 hr after dose. Have lab in 1 month by walk in appointment 90 Tablet 0    fluticasone propionate (FLONASE) 50 mcg/actuation nasal spray 2 SPRAYS PER NOSTRIL QD 1 Bottle 2    loratadine (Claritin) 10 mg tablet Take 1 Tab by mouth daily as needed for Allergies. Also indicated for respiratory infection 30 Tab 0    sodium chloride (Saline Mist) 0.65 % nasal squeeze bottle 0.05 mL by Both Nostrils route as needed for Congestion. 15 mL 1    temazepam (RESTORIL) 30 mg capsule Take 30 mg by mouth nightly.  lithium carbonate SR (LITHOBID) 300 mg CR tablet Take 600 mg by mouth nightly.  melatonin 5 mg tablet Take 5 mg by mouth nightly.  lisinopriL (PRINIVIL, ZESTRIL) 10 mg tablet Take 10 mg by mouth daily.  lamoTRIgine (LaMICtal) 150 mg tablet Take 150 mg by mouth two (2) times a day.  cholecalciferol (Vitamin D3) (1000 Units /25 mcg) tablet Take 1,000 Units by mouth daily.       insulin nph-regular human rec (HumuLIN 70/30 U-100 KwikPen) 100 unit/mL (70-30) inpn by SubCUTAneous route Before breakfast, lunch, and dinner. Sliding scale                 Patient to have accuchecks done before meals and at bedtime with normal sensitivity sliding scale. -150= 2 units  -200=4 units  -250=6 units  -300=8 units  -350= 10 units  -400= 12 units  > 400 call MD      atorvastatin (LIPITOR) 40 mg tablet Take 40 mg by mouth daily.  gabapentin (NEURONTIN) 300 mg capsule Take 600 mg by mouth three (3) times daily.  metFORMIN (GLUCOPHAGE) 1,000 mg tablet Take 1,000 mg by mouth two (2) times daily (with meals).  multivitamin (ONE A DAY) tablet Take 1 Tab by mouth daily.        Past Medical History:   Diagnosis Date    [facility]     Depression     Dissociative identity disorder ([facility]     Hyperlipidemia     Hypertension     Morbid obesity ([facility]     NIDDM (non-insulin dependent diabetes mellitus)     type 2    Psychiatric disorder     Depression/Anxiety    PTSD (post-traumatic stress disorder)     PTSD (post-traumatic stress disorder)      Past Surgical History:   Procedure Laterality Date    DELIVERY       HX BACK SURGERY      HX BREAST BIOPSY Left     u/s benign biopsy 3 years ago    HX CHOLECYSTECTOMY      HX TONSILLECTOMY      HX TUBAL LIGATION      IL REPAIR ACHILLES TENDON,PRIMARY       Allergies   Allergen Reactions    Darvon [Propoxyphene] Rash    Oxcarbazepine Other (comments)     Hyponatremia (severe)       REVIEW OF SYSTEMS:  [er hpi    Social History     Socioeconomic History    Marital status:      Spouse name: Not on file    Number of children: Not on file    Years of education: Not on file    Highest education level: Not on file   Tobacco Use    Smoking status: Never Smoker    Smokeless tobacco: Never Used   Vaping Use    Vaping Use: Never used   Substance and Sexual Activity    Alcohol use: No    Drug use: No    Sexual activity: Yes     Partners: Male     Birth control/protection: None     Social Determinants of Health     Financial Resource Strain:     Difficulty of Paying Living Expenses:    Food Insecurity:     Worried About Running Out of Food in the Last Year:     [address] in the Last Year:    Transportation Needs:     Lack of Transportation (Medical):  Lack of Transportation (Non-Medical):    Physical Activity:     Days of Exercise per Week:     Minutes of Exercise per Session:    Stress:     Feeling of Stress :    Social Connections:     Frequency of Communication with Friends and Family:     Frequency of Social Gatherings with Friends and Family:     Attends Nondenominational Services:     Active Member of Clubs or Organizations:     Attends Club or Organization Meetings:     Marital Status:      Family History   Problem Relation Age of Onset    Breast Cancer Mother 46    Lung Disease Mother     Heart Disease Mother     Hypertension Mother     Cancer Mother     Breast Cancer Sister 43    Breast Cancer Maternal Grandmother 52         from breast cancer    Breast Cancer Maternal Aunt         6, in 42's, 4  from breast cancer    Breast Cancer Cousin         21 maternal cousins    Heart Disease Father     Diabetes Father     Hypertension Father        OBJECTIVE:     Visit Vitals  /72   Pulse 72   Temp 99 °F (37.2 °C) (Oral)   Resp 18   Ht 5' 4\" (1.626 m)   Wt 260 lb 12.8 oz (118.3 kg)   LMP 2021 (Approximate)   SpO2 97%   BMI 44.77 kg/m²     CONSTITUTIONAL:  appears in usual state of health  EYES:  eom intact  NECK: supple. INTEGUMENT: warm and dry  MENTAL STATUS: alert ,appropriate affect     ASSESSMENT:   1. Subclinical hypothyroidism -stable on TRT   2. Type 2 diabetes mellitus without complication, without long-term current use of insulin (HCC) -stable mgt   4. Hyperlipidemia, unspecified hyperlipidemia type -stable   5.  Essential hypertension, benign -controlled   6. Class 3 severe obesity due to excess calories with serious comorbidity and body mass index (BMI) of 40.0 to 44.9 in adult (HCC) , + weight gain         PLAN:  .  Orders Placed This Encounter    TSH 3RD GENERATION    T4, FREE    HEMOGLOBIN A1C WITH EAG    REFERRAL TO OPHTHALMOLOGY    Ozempic 0.25 mg or 0.5 mg/dose (2 mg/1.5 ml) subq pen    levothyroxine (SYNTHROID) 25 mcg tablet       Follow-up and Dispositions    · Return in about 3 months (around 2/2/2022). I have discussed the diagnosis with the patient and the intended plan as seen in the  orders above. The patient understands and agrees with the plan. The patient has   received an after visit summary. Questions were answered concerning  future plans  Patient labs and/or xrays were reviewed as available. Past records were reviewed as available. Counseled regarding healthy lifestyle          Signed,  Zahraa Kaye M.D. This note was created using voice recognition software.   Edits have been made but syntax errors might exist.

## 2021-11-02 NOTE — PROGRESS NOTES
Chief Complaint   Patient presents with    Annual Wellness Visit    Diabetes     1. Have you been to the ER, urgent care clinic since your last visit? Hospitalized since your last visit? No    2. Have you seen or consulted any other health care providers outside of the [address] [place] [address] since your last visit? Include any pap smears or colon screening. No  \  Visit Vitals  /72   Pulse 72   Temp 99 °F (37.2 °C) (Oral)   Resp 18   Ht 5' 4\" (1.626 m)   Wt 260 lb 12.8 oz (118.3 kg)   SpO2 97%   BMI 44.77 kg/m²       This is the Subsequent Medicare Annual Wellness Exam, performed 12 months or more after the Initial AWV or the last Subsequent AWV    I have reviewed the patient's medical history in detail and updated the computerized patient record. Assessment/Plan   Education and counseling provided:  Are appropriate based on today's review and evaluation    1. Medicare annual wellness visit, subsequent  2. Subclinical hypothyroidism  -     levothyroxine (SYNTHROID) 25 mcg tablet; Take 1 Tablet by mouth Daily (before breakfast). Do not eat 1 hr after dose. Have lab in 1 month by walk in appointment, Normal, Disp-90 Tablet, R-0PLEASE HOLD PRESCRIPTION UNTIL  IS DUE  -     TSH 3RD GENERATION; Future  -     T4, FREE; Future  3. Type 2 diabetes mellitus without complication, without long-term current use of insulin (HCC)  -     REFERRAL TO OPHTHALMOLOGY  4. Prediabetes  -     HEMOGLOBIN A1C WITH EAG; Future  5. Hyperlipidemia, unspecified hyperlipidemia type  6. Essential hypertension, benign  7. Class 3 severe obesity due to excess calories with serious comorbidity and body mass index (BMI) of 40.0 to 44.9 in adult [facility])       Depression Risk Factor Screening   No flowsheet data found.     Alcohol Risk Screen    Do you average more than 1 drink per night or more than 7 drinks a week:  No    On any one occasion in the past three months have you have had more than 3 drinks containing alcohol: No        Functional Ability and Level of Safety    Hearing: Hearing is good. Activities of Daily Living: The home contains: no safety equipment. Patient does total self care      Ambulation: with no difficulty     Fall Risk:  No flowsheet data found.    Abuse Screen:  Patient is not abused       Cognitive Screening    Has your family/caregiver stated any concerns about your memory: no     Cognitive Screening: Normal - Clock Drawing Test    Health Maintenance Due     Health Maintenance Due   Topic Date Due    Hepatitis C Screening  Never done    Foot Exam Q1  Never done    Eye Exam Retinal or Dilated  Never done    DTaP/Tdap/Td series (1 - Tdap) Never done    Cervical cancer screen  Never done    Colorectal Cancer Screening Combo  Never done    Flu Vaccine (1) 09/01/2021       Patient Care Team   Patient Care Team:  Roslyn Tee MD as PCP - General (Family Medicine)  Roslyn Tee MD as PCP - [facility] Empaneled Provider    History     Patient Active Problem List   Diagnosis Code    Bipolar disorder, mixed ([facility] F31.60    Hyponatremia E87.1    Spondylisthesis M43.10    S/P lumbar spinal fusion Z98.1    Spinal stenosis of lumbar region at multiple levels M48.061    Type 2 diabetes mellitus without complication, without long-term current use of insulin (HCC) E11.9    Subclinical hypothyroidism E03.8    Class 3 severe obesity due to excess calories with serious comorbidity and body mass index (BMI) of 40.0 to 44.9 in adult ([address] E66.01, Z68.41     Past Medical History:   Diagnosis Date    Bipolar affective disorder ([facility]     Depression     Dissociative identity disorder ([facility] [facility]     Hyperlipidemia     Hypertension     Morbid obesity ([address]     NIDDM (non-insulin dependent diabetes mellitus)     type 2    Psychiatric disorder     Depression/Anxiety    PTSD (post-traumatic stress disorder)     PTSD (post-traumatic stress disorder)       Past Surgical History:   Procedure Laterality Date    DELIVERY       HX BACK SURGERY      HX BREAST BIOPSY Left     u/s benign biopsy 3 years ago    HX CHOLECYSTECTOMY      HX TONSILLECTOMY      HX TUBAL LIGATION      IN REPAIR ACHILLES TENDON,PRIMARY       Current Outpatient Medications   Medication Sig Dispense Refill    Ozempic 0.25 mg or 0.5 mg/dose (2 mg/1.5 ml) subq pen       levothyroxine (SYNTHROID) 25 mcg tablet Take 1 Tablet by mouth Daily (before breakfast). Do not eat 1 hr after dose. Have lab in 1 month by walk in appointment 90 Tablet 0    fluticasone propionate (FLONASE) 50 mcg/actuation nasal spray 2 SPRAYS PER NOSTRIL QD 1 Bottle 2    loratadine (Claritin) 10 mg tablet Take 1 Tab by mouth daily as needed for Allergies. Also indicated for respiratory infection 30 Tab 0    sodium chloride (Saline Mist) 0.65 % nasal squeeze bottle 0.05 mL by Both Nostrils route as needed for Congestion. 15 mL 1    temazepam (RESTORIL) 30 mg capsule Take 30 mg by mouth nightly.  lithium carbonate SR (LITHOBID) 300 mg CR tablet Take 600 mg by mouth nightly.  melatonin 5 mg tablet Take 5 mg by mouth nightly.  lisinopriL (PRINIVIL, ZESTRIL) 10 mg tablet Take 10 mg by mouth daily.  lamoTRIgine (LaMICtal) 150 mg tablet Take 150 mg by mouth two (2) times a day.  cholecalciferol (Vitamin D3) (1000 Units /25 mcg) tablet Take 1,000 Units by mouth daily.  insulin nph-regular human rec (HumuLIN 70/30 U-100 KwikPen) 100 unit/mL (70-30) inpn by SubCUTAneous route Before breakfast, lunch, and dinner. Sliding scale                 Patient to have accuchecks done before meals and at bedtime with normal sensitivity sliding scale. -150= 2 units  -200=4 units  -250=6 units  -300=8 units  -350= 10 units  -400= 12 units  > 400 call MD      atorvastatin (LIPITOR) 40 mg tablet Take 40 mg by mouth daily.  gabapentin (NEURONTIN) 300 mg capsule Take 600 mg by mouth three (3) times daily.       metFORMIN (GLUCOPHAGE) 1,000 mg tablet Take 1,000 mg by mouth two (2) times daily (with meals).  multivitamin (ONE A DAY) tablet Take 1 Tab by mouth daily.        Allergies   Allergen Reactions    Darvon [Propoxyphene] Rash    Oxcarbazepine Other (comments)     Hyponatremia (severe)       Family History   Problem Relation Age of Onset    Breast Cancer Mother 46    Lung Disease Mother     Heart Disease Mother     Hypertension Mother     Cancer Mother     Breast Cancer Sister 43    Breast Cancer Maternal Grandmother 52         from breast cancer    Breast Cancer Maternal Aunt         6, in 42's, 4  from breast cancer    Breast Cancer Cousin         21 maternal cousins    Heart Disease Father     Diabetes Father     Hypertension Father      Social History     Tobacco Use    Smoking status: Never Smoker    Smokeless tobacco: Never Used   Substance Use Topics    Alcohol use: No         Celia Wu

## 2021-11-03 PROBLEM — E11.9 TYPE 2 DIABETES MELLITUS WITHOUT COMPLICATION, WITHOUT LONG-TERM CURRENT USE OF INSULIN (HCC): Status: ACTIVE | Noted: 2021-11-03

## 2021-11-03 PROBLEM — E03.8 SUBCLINICAL HYPOTHYROIDISM: Status: ACTIVE | Noted: 2021-11-03

## 2021-11-03 NOTE — PATIENT INSTRUCTIONS
Medicare Wellness Visit, Female     The best way to live healthy is to have a lifestyle where you eat a well-balanced diet, exercise regularly, limit alcohol use, and quit all forms of tobacco/nicotine, if applicable. Regular preventive services are another way to keep healthy. Preventive services (vaccines, screening tests, monitoring & exams) can help personalize your care plan, which helps you manage your own care. Screening tests can find health problems at the earliest stages, when they are easiest to treat. [facility] follows the current, evidence-based guidelines published by the [facility] ([facility]) when recommending preventive services for our patients. Because we follow these guidelines, sometimes recommendations change over time as research supports it. (For example, mammograms used to be recommended annually. Even though Medicare will still pay for an annual mammogram, the newer guidelines recommend a mammogram every two years for women of average risk). Of course, you and your doctor may decide to screen more often for some diseases, based on your risk and your co-morbidities (chronic disease you are already diagnosed with). Preventive services for you include:  - Medicare offers their members a free annual wellness visit, which is time for you and your primary care provider to discuss and plan for your preventive service needs. Take advantage of this benefit every year!  -All adults over the age of 72 should receive the recommended pneumonia vaccines. Current USPSTF guidelines recommend a series of two vaccines for the best pneumonia protection.   -All adults should have a flu vaccine yearly and a tetanus vaccine every 10 years.   -All adults age 48 and older should receive the shingles vaccines (series of two vaccines).       -All adults age 38-68 who are overweight should have a diabetes screening test once every three years.   -All adults born between [birth year] and 1963 should be screened once for Hepatitis C.  -Other screening tests and preventive services for persons with diabetes include: an eye exam to screen for diabetic retinopathy, a kidney function test, a foot exam, and stricter control over your cholesterol.   -Cardiovascular screening for adults with routine risk involves an electrocardiogram (ECG) at intervals determined by your doctor.   -Colorectal cancer screenings should be done for adults age 54-65 with no increased risk factors for colorectal cancer. There are a number of acceptable methods of screening for this type of cancer. Each test has its own benefits and drawbacks. Discuss with your doctor what is most appropriate for you during your annual wellness visit. The different tests include: colonoscopy (considered the best screening method), a fecal occult blood test, a fecal DNA test, and sigmoidoscopy.    -A bone mass density test is recommended when a woman turns 65 to screen for osteoporosis. This test is only recommended one time, as a screening. Some providers will use this same test as a disease monitoring tool if you already have osteoporosis. -Breast cancer screenings are recommended every other year for women of normal risk, age 54-69.  -Cervical cancer screenings for women over age 72 are only recommended with certain risk factors. Here is a list of your current Health Maintenance items (your personalized list of preventive services) with a due date:  Health Maintenance Due   Topic Date Due    Hepatitis C Test  Never done    Diabetic Foot Care  Never done    Eye Exam  Never done    DTaP/Tdap/Td  (1 - Tdap) Never done    Cervical cancer screen  Never done    Colorectal Screening  Never done    Yearly Flu Vaccine (1) 09/01/2021         Vaccine Information Statement    Influenza (Flu) Vaccine (Inactivated or Recombinant):  What You Need to Know    Many vaccine information statements are available in Faroese and other languages. See www.immunize.org/vis. Hojas de información sobre vacunas están disponibles en español y en muchos otros idiomas. Visite www.immunize.org/vis. 1. Why get vaccinated? Influenza vaccine can prevent influenza (flu). Flu is a contagious disease that spreads around the [facility] every year, usually between October and May. Anyone can get the flu, but it is more dangerous for some people. Infants and young children, people 72 years and older, pregnant people, and people with certain health conditions or a weakened immune system are at greatest risk of flu complications. Pneumonia, bronchitis, sinus infections, and ear infections are examples of flu-related complications. If you have a medical condition, such as heart disease, cancer, or diabetes, flu can make it worse. Flu can cause fever and chills, sore throat, muscle aches, fatigue, cough, headache, and runny or stuffy nose. Some people may have vomiting and diarrhea, though this is more common in children than adults. In an average year, thousands of people in the [facility] die from flu, and many more are hospitalized. Flu vaccine prevents millions of illnesses and flu-related visits to the doctor each year. 2. Influenza vaccines     CDC recommends everyone 6 months and older get vaccinated every flu season. Children 6 months through 6years of age may need 2 doses during a single flu season. Everyone else needs only 1 dose each flu season. It takes about 2 weeks for protection to develop after vaccination. There are many flu viruses, and they are always changing. Each year a new flu vaccine is made to protect against the influenza viruses believed to be likely to cause disease in the upcoming flu season. Even when the vaccine doesnt exactly match these viruses, it may still provide some protection. Influenza vaccine does not cause flu.     Influenza vaccine may be given at the same time as other vaccines. 3. Talk with your health care provider    Tell your vaccination provider if the person getting the vaccine:   Has had an allergic reaction after a previous dose of influenza vaccine, or has any severe, life-threatening allergies    Has ever had Guillain-Barré Syndrome (also called GBS)    In some cases, your health care provider may decide to postpone influenza vaccination until a future visit. Influenza vaccine can be administered at any time during pregnancy. People who are or will be pregnant during influenza season should receive inactivated influenza vaccine. People with minor illnesses, such as a cold, may be vaccinated. People who are moderately or severely ill should usually wait until they recover before getting influenza vaccine. Your health care provider can give you more information. 4. Risks of a vaccine reaction     Soreness, redness, and swelling where the shot is given, fever, muscle aches, and headache can happen after influenza vaccination.  There may be a very small increased risk of Guillain-Barré Syndrome (GBS) after inactivated influenza vaccine (the flu shot). [facility] children who get the flu shot along with pneumococcal vaccine (PCV13) and/or DTaP vaccine at the same time might be slightly more likely to have a seizure caused by fever. Tell your health care provider if a child who is getting flu vaccine has ever had a seizure. People sometimes faint after medical procedures, including vaccination. Tell your provider if you feel dizzy or have vision changes or ringing in the ears. As with any medicine, there is a very remote chance of a vaccine causing a severe allergic reaction, other serious injury, or death. 5. What if there is a serious problem? An allergic reaction could occur after the vaccinated person leaves the clinic.  If you see signs of a severe allergic reaction (hives, swelling of the face and throat, difficulty breathing, a fast heartbeat, dizziness, or weakness), call 9-1-1 and get the person to the nearest hospital.    For other signs that concern you, call your health care provider. Adverse reactions should be reported to the Vaccine Adverse Event Reporting System (VAERS). Your health care provider will usually file this report, or you can do it yourself. Visit the VAERS website at www.vaers. [facility].gov or call 0-574.727.8736. VAERS is only for reporting reactions, and VAERS staff members do not give medical advice. 6. The National Vaccine Injury Compensation Program    The [facility] Vaccine Injury Compensation Program (VICP) is a federal program that was created to compensate people who may have been injured by certain vaccines. Claims regarding alleged injury or death due to vaccination have a time limit for filing, which may be as short as two years. Visit the VICP website at www.[facility].gov/vaccinecompensation or call 1-651.882.7390 to learn about the program and about filing a claim. 7. How can I learn more?  Ask your health care provider.  Call your local or state health department.  Visit the website of the Food and Drug Administration (FDA) for vaccine package inserts and additional information at www.fda.gov/vaccines-blood-biologics/vaccines.  Contact the Centers for Disease Control and Prevention (CDC):  - Call 4-546.504.2118 (1-800-CDC-INFO) or  - Visit CDCs influenza website at www.cdc.gov/flu. Vaccine Information Statement   Inactivated Influenza Vaccine   8/6/2021  [address] DIANA Gill [id number]   Department of Health and Human Services  Centers for Disease Control and Prevention    Office Use Only

## 2022-02-08 DIAGNOSIS — E03.8 SUBCLINICAL HYPOTHYROIDISM: ICD-10-CM

## 2022-02-10 RX ORDER — LEVOTHYROXINE SODIUM 25 UG/1
25 TABLET ORAL
Qty: 90 TABLET | Refills: 0 | Status: SHIPPED | OUTPATIENT
Start: 2022-02-10

## 2022-06-27 ENCOUNTER — OFFICE VISIT (OUTPATIENT)
Dept: INTERNAL MEDICINE CLINIC | Age: 52
End: 2022-06-27
Payer: MEDICARE

## 2022-06-27 VITALS
WEIGHT: 244 LBS | OXYGEN SATURATION: 98 % | DIASTOLIC BLOOD PRESSURE: 79 MMHG | HEART RATE: 80 BPM | RESPIRATION RATE: 19 BRPM | TEMPERATURE: 98 F | BODY MASS INDEX: 41.66 KG/M2 | HEIGHT: 64 IN | SYSTOLIC BLOOD PRESSURE: 119 MMHG

## 2022-06-27 DIAGNOSIS — E11.9 TYPE 2 DIABETES MELLITUS WITHOUT COMPLICATION, WITHOUT LONG-TERM CURRENT USE OF INSULIN (HCC): Primary | ICD-10-CM

## 2022-06-27 DIAGNOSIS — I10 ESSENTIAL HYPERTENSION, BENIGN: ICD-10-CM

## 2022-06-27 DIAGNOSIS — Z11.59 ENCOUNTER FOR HEPATITIS C SCREENING TEST FOR LOW RISK PATIENT: ICD-10-CM

## 2022-06-27 DIAGNOSIS — E03.9 ACQUIRED HYPOTHYROIDISM: ICD-10-CM

## 2022-06-27 DIAGNOSIS — E66.01 OBESITY, CLASS III, BMI 40-49.9 (MORBID OBESITY) (HCC): ICD-10-CM

## 2022-06-27 DIAGNOSIS — E66.01 CLASS 3 SEVERE OBESITY DUE TO EXCESS CALORIES WITH SERIOUS COMORBIDITY AND BODY MASS INDEX (BMI) OF 40.0 TO 44.9 IN ADULT (HCC): ICD-10-CM

## 2022-06-27 DIAGNOSIS — E78.5 HYPERLIPIDEMIA, UNSPECIFIED HYPERLIPIDEMIA TYPE: ICD-10-CM

## 2022-06-27 DIAGNOSIS — Z79.899 ENCOUNTER FOR LONG-TERM (CURRENT) USE OF OTHER MEDICATIONS: ICD-10-CM

## 2022-06-27 PROCEDURE — G8417 CALC BMI ABV UP PARAM F/U: HCPCS | Performed by: FAMILY MEDICINE

## 2022-06-27 PROCEDURE — G8752 SYS BP LESS 140: HCPCS | Performed by: FAMILY MEDICINE

## 2022-06-27 PROCEDURE — 2022F DILAT RTA XM EVC RTNOPTHY: CPT | Performed by: FAMILY MEDICINE

## 2022-06-27 PROCEDURE — G9717 DOC PT DX DEP/BP F/U NT REQ: HCPCS | Performed by: FAMILY MEDICINE

## 2022-06-27 PROCEDURE — G8427 DOCREV CUR MEDS BY ELIG CLIN: HCPCS | Performed by: FAMILY MEDICINE

## 2022-06-27 PROCEDURE — 99213 OFFICE O/P EST LOW 20 MIN: CPT | Performed by: FAMILY MEDICINE

## 2022-06-27 PROCEDURE — G8754 DIAS BP LESS 90: HCPCS | Performed by: FAMILY MEDICINE

## 2022-06-27 PROCEDURE — 3046F HEMOGLOBIN A1C LEVEL >9.0%: CPT | Performed by: FAMILY MEDICINE
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
## 2022-06-28 LAB — HBA1C MFR BLD HPLC: 5.7 %

## 2022-12-23 NOTE — PROGRESS NOTES
Reviewed discharge instructions, prescriptions, and side effects with patient and her . Reviewed wound care, follow-up instructions, and medication instructions. Answered all questions and provided contact information for future questions. Took IV out per policy, Catheter tip intact. Provided Post-op Hygiene kit. Going home in a car with home health. This is a chronic problem.  Continue to follow.  Encourage weight reduction.

## (undated) DEVICE — BONE WAX WHITE: Brand: BONE WAX WHITE

## (undated) DEVICE — SPHERE STEALTH 12PK/TY --

## (undated) DEVICE — NDL SPNE QNCKE 18GX3.5IN LF --

## (undated) DEVICE — SUTURE STRATAFIX SPRL MCRYL + SZ 2-0 L18IN ABSRB UD CT-1 SXMP1B413

## (undated) DEVICE — DRAPE MICSCP W20XL64IN CLR LENS WECK FOR ZEISS OPMI

## (undated) DEVICE — GOWN,SIRUS,NONRNF,SETINSLV,2XL,18/CS: Brand: MEDLINE

## (undated) DEVICE — 3M™ IOBAN™ 2 ANTIMICROBIAL INCISE DRAPE 6650EZ: Brand: IOBAN™ 2

## (undated) DEVICE — SUTURE VCRL SZ 1 L18IN ABSRB VLT CT-1 L36MM 1/2 CIR J741D

## (undated) DEVICE — SURGIFOAM SPNG SZ 100

## (undated) DEVICE — KENDALL SCD EXPRESS SLEEVES, KNEE LENGTH, MEDIUM: Brand: KENDALL SCD

## (undated) DEVICE — DRAPE,LAPAROTOMY,PCH,STERILE: Brand: MEDLINE

## (undated) DEVICE — BIPOLAR FORCEPS CORD,BANANA LEADS: Brand: VALLEYLAB

## (undated) DEVICE — DRAPE,CHEST,FENES,15X10,STERIL: Brand: MEDLINE

## (undated) DEVICE — 3000CC GUARDIAN II: Brand: GUARDIAN

## (undated) DEVICE — SOLUTION IRRIG 1000ML H2O STRL BLT

## (undated) DEVICE — BNDG ADH FABRIC 2X4IN ST LF --

## (undated) DEVICE — Z CONVERTED USE 2107985 COVER FLROSCP W36XL28IN 4 SIDE ADH

## (undated) DEVICE — STERILE POLYISOPRENE POWDER-FREE SURGICAL GLOVES: Brand: PROTEXIS

## (undated) DEVICE — SUTURE V-LOC 180 SZ 0 L12IN ABSRB GRN L37MM GS-21 1/2 CIR VLOCL0316

## (undated) DEVICE — PICO SINGLE USE NEGATIVE PRESSURE WOUND THERAPY SYSTEM 15CM X 15CM  6IN. X 6IN.: Brand: PICO

## (undated) DEVICE — DRSG PATCH ANTIMIC 1INX4.0MM -- CONVERT TO ITEM 356053

## (undated) DEVICE — FLOSEAL MATRIX IS INDICATED IN SURGICAL PROCEDURES (OTHER THAN IN OPHTHALMIC) AS AN ADJUNCT TO HEMOSTASIS WHEN CONTROL OF BLEEDING BY LIGATURE OR CONVENTIONALPROCEDURES IS INEFFECTIVE OR IMPRACTICAL.: Brand: FLOSEAL HEMOSTATIC MATRIX

## (undated) DEVICE — GAUZE SPONGES,12 PLY: Brand: CURITY

## (undated) DEVICE — PIN 9733236 150MM STERILE PERC REF

## (undated) DEVICE — 450 ML BOTTLE OF 0.05% CHLORHEXIDINE GLUCONATE IN 99.95% STERILE WATER FOR IRRIGATION, USP AND APPLICATOR.: Brand: IRRISEPT ANTIMICROBIAL WOUND LAVAGE

## (undated) DEVICE — MEDI-VAC NON-CONDUCTIVE SUCTION TUBING: Brand: CARDINAL HEALTH

## (undated) DEVICE — 3.0MM PRECISION NEURO (MATCH HEAD)

## (undated) DEVICE — COVER,MAYO STAND,STERILE: Brand: MEDLINE

## (undated) DEVICE — HANDLE LT SNAP ON ULT DURABLE LENS FOR TRUMPF ALC DISPOSABLE

## (undated) DEVICE — INFECTION CONTROL KIT SYS

## (undated) DEVICE — STERILE POLYISOPRENE POWDER-FREE SURGICAL GLOVES WITH EMOLLIENT COATING: Brand: PROTEXIS

## (undated) DEVICE — TRAY CATH 16F DRN BG LTX -- CONVERT TO ITEM 363158

## (undated) DEVICE — SUTURE VCRL SZ 2-0 L18IN ABSRB UD CT-1 L36MM 1/2 CIR J839D

## (undated) DEVICE — KIT JACK TBL PT CARE

## (undated) DEVICE — 3M™ TEGADERM™ TRANSPARENT FILM DRESSING FRAME STYLE, 1626W, 4 IN X 4-3/4 IN (10 CM X 12 CM), 50/CT 4CT/CASE: Brand: 3M™ TEGADERM™

## (undated) DEVICE — SPONGE GZ W4XL4IN COT 12 PLY TYP VII WVN C FLD DSGN

## (undated) DEVICE — 3M™ STERI-DRAPE™ INSTRUMENT POUCH 1018: Brand: STERI-DRAPE™

## (undated) DEVICE — DRAPE MICSCP W54XL150IN STD OCU CVR CLEARLENS TECHNOLOGY FOR

## (undated) DEVICE — (D)PREP SKN CHLRAPRP APPL 26ML -- CONVERT TO ITEM 371833

## (undated) DEVICE — SKIN MARKER,REGULAR TIP WITH RULER AND LABELS: Brand: DEVON

## (undated) DEVICE — TOOL 14MH30 LEGEND 14CM 3MM: Brand: MIDAS REX ™

## (undated) DEVICE — ELECTRODE BLDE L4IN NONINSULATED EDGE

## (undated) DEVICE — DRAPE,LAP,CHOLE,W/TROUGHS,STERILE: Brand: MEDLINE

## (undated) DEVICE — RETRACTOR KIT FOR INTERCONTINENTAL PLATE SPACER SYS MARS 3V

## (undated) DEVICE — PICO 7 DUAL 20X20CM: Brand: PICO™ 7

## (undated) DEVICE — SURGICAL LIGHT

## (undated) DEVICE — DISSECTOR LAP DIA5MM BLNT TIP ENDOPATH

## (undated) DEVICE — BLADE ES L4IN INSUL EDGE

## (undated) DEVICE — NEEDLE BX 11GA L152MM STREAMLINED SUP SHRP T HNDL TRCR TAPR

## (undated) DEVICE — TUBING, SUCTION, 1/4" X 10', STRAIGHT: Brand: MEDLINE

## (undated) DEVICE — SYR 50ML LR LCK 1ML GRAD NSAF --

## (undated) DEVICE — DRAPE C-ARMOUR C-ARM KIT --

## (undated) DEVICE — LAMINECTOMY RICHMOND-LF: Brand: MEDLINE INDUSTRIES, INC.

## (undated) DEVICE — GARMENT,MEDLINE,DVT,INT,CALF,MED, GEN2: Brand: MEDLINE

## (undated) DEVICE — SUTURE ABSORBABLE BRAIDED 2-0 CT-1 27 IN UD VICRYL J259H

## (undated) DEVICE — BLUNT DISSECTOR: Brand: ENDO PEANUT

## (undated) DEVICE — TOWEL SURG W17XL27IN STD BLU COT NONFENESTRATED PREWASHED

## (undated) DEVICE — COVER,TABLE,60X90,STERILE: Brand: MEDLINE

## (undated) DEVICE — DRAIN SURG 10FR SIL RND HUBLESS W/ 1/8IN TRCR BLAK

## (undated) DEVICE — APPLIER CLP L9.38IN M LIG TI DISP STR RNG HNDL LIGACLP

## (undated) DEVICE — NEEDLE HYPO 22GA L1.5IN BLK S STL HUB POLYPR SHLD REG BVL

## (undated) DEVICE — NEEDLE HYPO 18GA L1.5IN PNK S STL HUB POLYPR SHLD REG BVL

## (undated) DEVICE — SOLUTION IV 1000ML 0.9% SOD CHL

## (undated) DEVICE — FLOSEAL HEMOSTATIC MATRIX, 5 ML: Brand: FLOSEAL

## (undated) DEVICE — SUTURE MCRYL SZ 2-0 L36IN ABSRB UD L36MM CT-1 1/2 CIR Y945H

## (undated) DEVICE — DRAPE C ARM DISP FOR EXCELSIUSGPS ROBOTIC NAVIGATION PLATFRM

## (undated) DEVICE — PENCIL SMK EVAC L10FT DIA95MM TBNG NONSTICK W ADPT TO 22MM

## (undated) DEVICE — DRAPE MON DISP FOR EXCELSIUSGPS ROBOTIC NAVIGATION PLATFRM

## (undated) DEVICE — C-ARMOR C-ARM EQUIPMENT COVERS CLEAR STERILE UNIVERSAL FIT 12 PER CASE: Brand: C-ARMOR

## (undated) DEVICE — Device

## (undated) DEVICE — DRAPE,REIN 53X77,STERILE: Brand: MEDLINE

## (undated) DEVICE — 4-PORT MANIFOLD: Brand: NEPTUNE 2

## (undated) DEVICE — CONTAINER,SPECIMEN,3OZ,OR STRL: Brand: MEDLINE

## (undated) DEVICE — STAPLER SKIN 35CT WD STRL DISP -- MULTIFIRE PREMIUM

## (undated) DEVICE — GOWN,SIRUS,NONRNF,SETINSLV,XL,20/CS: Brand: MEDLINE

## (undated) DEVICE — SOLUTION IRRIG 3000ML 0.9% SOD CHL FLX CONT 0797208] ICU MEDICAL INC]

## (undated) DEVICE — ROCKER SWITCH PENCIL BLADE ELECTRODE, HOLSTER: Brand: EDGE

## (undated) DEVICE — 1200 GUARD II KIT W/5MM TUBE W/O VAC TUBE: Brand: GUARDIAN

## (undated) DEVICE — DRAPE XR C ARM 41X74IN LF --

## (undated) DEVICE — PREP SKN CHLRAPRP APL 26ML STR --

## (undated) DEVICE — STRAP,POSITIONING,KNEE/BODY,FOAM,4X60": Brand: MEDLINE

## (undated) DEVICE — TOTAL TRAY, 16FR 10ML SIL FOLEY, URN: Brand: MEDLINE

## (undated) DEVICE — BIPOLAR FORCEPS CORD: Brand: VALLEYLAB

## (undated) DEVICE — TUBING IRRIG L77IN DIA0.241IN L BOR FOR CYSTO W/ NVENT

## (undated) DEVICE — LABEL MED MRMC ORTH STRL

## (undated) DEVICE — SYSTEM SKIN CLSR 22CM DERMBND PRINEO

## (undated) DEVICE — 3M™ BAIR HUGGER® UNDERBODY BLANKET, SPINAL, 10 PER CASE 57501: Brand: BAIR HUGGER™

## (undated) DEVICE — DEVICE TRNSF SPIK STL 2008S] MICROTEK MEDICAL INC]

## (undated) DEVICE — BONE MARROW KIT ASPIR 11 GA

## (undated) DEVICE — BLADE SURG NO10 C STL REUSE HNDL CONVENTIONAL DISP RIB BK